# Patient Record
Sex: FEMALE | Race: WHITE | NOT HISPANIC OR LATINO | Employment: OTHER | URBAN - METROPOLITAN AREA
[De-identification: names, ages, dates, MRNs, and addresses within clinical notes are randomized per-mention and may not be internally consistent; named-entity substitution may affect disease eponyms.]

---

## 2024-01-03 ENCOUNTER — HOSPITAL ENCOUNTER (EMERGENCY)
Facility: HOSPITAL | Age: 89
Discharge: HOME | End: 2024-01-03
Attending: EMERGENCY MEDICINE
Payer: MEDICARE

## 2024-01-03 ENCOUNTER — APPOINTMENT (EMERGENCY)
Dept: RADIOLOGY | Facility: HOSPITAL | Age: 89
End: 2024-01-03
Payer: MEDICARE

## 2024-01-03 VITALS
DIASTOLIC BLOOD PRESSURE: 84 MMHG | OXYGEN SATURATION: 90 % | RESPIRATION RATE: 20 BRPM | SYSTOLIC BLOOD PRESSURE: 174 MMHG | TEMPERATURE: 98.1 F | WEIGHT: 124.34 LBS | HEART RATE: 97 BPM

## 2024-01-03 DIAGNOSIS — J20.5 RSV BRONCHITIS: Primary | ICD-10-CM

## 2024-01-03 DIAGNOSIS — I10 HYPERTENSION, UNSPECIFIED TYPE: ICD-10-CM

## 2024-01-03 LAB
ATRIAL RATE: 96 BPM
FLUAV RNA RESP QL NAA+PROBE: NEGATIVE
FLUBV RNA RESP QL NAA+PROBE: NEGATIVE
P AXIS: 222 DEGREES
PR INTERVAL: 158 MS
QRS AXIS: 179 DEGREES
QRSD INTERVAL: 46 MS
QT INTERVAL: 234 MS
QTC INTERVAL: 372 MS
RSV RNA RESP QL NAA+PROBE: POSITIVE
SARS-COV-2 RNA RESP QL NAA+PROBE: NEGATIVE
T WAVE AXIS: 216 DEGREES
VENTRICULAR RATE: 152 BPM

## 2024-01-03 PROCEDURE — 71046 X-RAY EXAM CHEST 2 VIEWS: CPT

## 2024-01-03 PROCEDURE — 99283 EMERGENCY DEPT VISIT LOW MDM: CPT

## 2024-01-03 PROCEDURE — 0241U HB NFCT DS VIR RESP RNA 4 TRGT: CPT | Performed by: EMERGENCY MEDICINE

## 2024-01-03 PROCEDURE — 93005 ELECTROCARDIOGRAM TRACING: CPT

## 2024-01-03 PROCEDURE — 99285 EMERGENCY DEPT VISIT HI MDM: CPT | Performed by: EMERGENCY MEDICINE

## 2024-01-03 RX ORDER — BENZONATATE 100 MG/1
100 CAPSULE ORAL ONCE
Status: COMPLETED | OUTPATIENT
Start: 2024-01-03 | End: 2024-01-03

## 2024-01-03 RX ADMIN — BENZONATATE 100 MG: 100 CAPSULE ORAL at 17:23

## 2024-01-03 NOTE — ED NOTES
Called MARYELLEN Barnes and provided with update. Requesting transport be arranged back home since patient is bedbound. Coordinated via Round Trip, per request.     Yesica Hurt RN  01/03/24 1539

## 2024-01-03 NOTE — ED NOTES
MARYELLEN Seaman, can be reached at [phone].       Patient's updated address:   [address] [place] [address] LACEY Banuelos RN  01/03/24 3879

## 2024-01-03 NOTE — ED PROVIDER NOTES
History  Chief Complaint   Patient presents with    Cough     Patient presents from home via EMS for congested cough. Per ems family reports RSV going throughout the house hold. Patient has history of dementia     89 YR FEMALE WITH  MILD DEMENTIA-- LIVES WITH GRAND DAUGHTER-- SINCE MONDAY WITH  NON PRODUCTIVE WET SOUND ING COUGH- HAD RECENT VIRTUAL VISIT -- PLACED ON COUGH MEDS--  AS PER ALISE- GRAND DAUGHTER -- TODAY MILD CONFUSION- NO FEVER- NO V/D- STATES HAS BED SORE UNDER TX--  NO TRAUMA- FALL- NO OTHER COMP      History provided by:  Relative  History limited by:  Dementia  Cough  Cough characteristics:  Dry  Severity:  Moderate  Onset quality:  Gradual  Duration:  3 days  Timing:  Intermittent  Associated symptoms: no shortness of breath and no wheezing        None       History reviewed. No pertinent past medical history.    History reviewed. No pertinent surgical history.    History reviewed. No pertinent family history.  I have reviewed and agree with the history as documented.    E-Cigarette/Vaping     E-Cigarette/Vaping Substances     Social History     Tobacco Use    Smoking status: Never    Smokeless tobacco: Never   Substance Use Topics    Alcohol use: Never    Drug use: Never       Review of Systems   Constitutional: Negative.    HENT: Negative.     Eyes: Negative.    Respiratory:  Positive for cough. Negative for apnea, choking, chest tightness, shortness of breath, wheezing and stridor.    Cardiovascular: Negative.    Gastrointestinal: Negative.    Endocrine: Negative.    Genitourinary: Negative.    Musculoskeletal: Negative.    Skin: Negative.    Allergic/Immunologic: Negative.    Neurological: Negative.    Hematological: Negative.    Psychiatric/Behavioral:  Positive for confusion. Negative for agitation, behavioral problems, decreased concentration, dysphoric mood, hallucinations, self-injury, sleep disturbance and suicidal ideas. The patient is not nervous/anxious and is not hyperactive.          THOUGHT SON WHO IS CURRENTLY IN MED REHAB WAS IN ROOM WITH HER TODAY  --        Physical Exam  Physical Exam  Vitals and nursing note reviewed.   Constitutional:       General: She is not in acute distress.     Appearance: She is not ill-appearing, toxic-appearing or diaphoretic.      Comments: FREQUNET COUGH IN ER- NON PRODUCTIVE- HTNSIVE- - PULSE OX 93 % ON RA- INTERPRETATION IS LOW- NORMAL    HENT:      Head: Normocephalic and atraumatic.      Right Ear: Tympanic membrane, ear canal and external ear normal. There is no impacted cerumen.      Left Ear: Tympanic membrane, ear canal and external ear normal. There is no impacted cerumen.      Nose: Nose normal. No congestion or rhinorrhea.      Mouth/Throat:      Mouth: Mucous membranes are dry.      Pharynx: Oropharynx is clear. No oropharyngeal exudate or posterior oropharyngeal erythema.   Eyes:      General: No scleral icterus.        Right eye: No discharge.         Left eye: No discharge.      Extraocular Movements: Extraocular movements intact.      Conjunctiva/sclera: Conjunctivae normal.      Pupils: Pupils are equal, round, and reactive to light.      Comments: MM PINK   Neck:      Vascular: No carotid bruit.      Comments: NO PMT C SPINE   Cardiovascular:      Rate and Rhythm: Regular rhythm.      Heart sounds: Murmur heard.      No friction rub. No gallop.   Pulmonary:      Effort: Pulmonary effort is normal. No respiratory distress.      Breath sounds: No stridor. Rhonchi present. No wheezing or rales.      Comments: LEFT SIDED LUNG RHONCI  Chest:      Chest wall: No tenderness.   Abdominal:      General: Bowel sounds are normal. There is no distension.      Palpations: Abdomen is soft. There is no mass.      Tenderness: There is no abdominal tenderness. There is no right CVA tenderness, left CVA tenderness, guarding or rebound.      Hernia: No hernia is present.      Comments: SOFT NT/ND- NO HSM - NO CVA TENDERNESS - NO ASCITES- NO PERITONEAL SIGNS-  NO PULSATILE ABD MASS/BRUIT/ TENDERNESS   Musculoskeletal:         General: No swelling, tenderness, deformity or signs of injury. Normal range of motion.      Cervical back: Normal range of motion and neck supple. No rigidity or tenderness.      Right lower leg: No edema.      Left lower leg: No edema.      Comments: EQUAL BILATERAL RADIAL PULSES- BLE BROWNISH SKIN DISCLORATION - NO EDEMA- ERYTHEMA- ASYM   Lymphadenopathy:      Cervical: No cervical adenopathy.   Skin:     Capillary Refill: Capillary refill takes less than 2 seconds.      Coloration: Skin is pale. Skin is not jaundiced.      Findings: No bruising, erythema, lesion or rash.   Neurological:      General: No focal deficit present.      Mental Status: She is alert. Mental status is at baseline.      Cranial Nerves: No cranial nerve deficit.      Sensory: No sensory deficit.      Motor: Weakness present.      Comments: A AND O X 2- BASELINE- NON FOCAL NEURO EXAM- DIFFUSE MILD 4/5  BLE/BLE WEAKNESS   Psychiatric:         Mood and Affect: Mood normal.         Behavior: Behavior normal.         Vital Signs  ED Triage Vitals [01/03/24 1621]   Temperature Pulse Respirations Blood Pressure SpO2   98.1 °F (36.7 °C) 60 21 (!) 188/85 93 %      Temp Source Heart Rate Source Patient Position - Orthostatic VS BP Location FiO2 (%)   Oral Monitor Sitting Right arm --      Pain Score       --           Vitals:    01/03/24 1621   BP: (!) 188/85   Pulse: 60   Patient Position - Orthostatic VS: Sitting         Visual Acuity      ED Medications  Medications   benzonatate (TESSALON PERLES) capsule 100 mg (100 mg Oral Given 1/3/24 1723)       Diagnostic Studies  Results Reviewed       Procedure Component Value Units Date/Time    COVID/FLU/RSV [[id number]] Collected: 01/03/24 1724    Lab Status: In process Specimen: Nares from Nose Updated: 01/03/24 1735                   XR chest 2 views    (Results Pending)              Procedures  Procedures         ED Course  ED Course as  of 01/07/24 1257   Wed Jan 03, 2024   1728 ER MD NOTE- CASE D/W- GRAND DAUGHTER ALISE-- WET SOUNDING COUGH STARTED ON MONDAY -- PT IS HOME BOUND- WAS AROUND  FAMILY MEMBER WITH RSV LAST WEEK - PT IS NON AMBULATORY - NO FEVERS-  ALISE GIVES DAUGHTER ALL MEDS- NO TRAUMA-   INCREASE IN CONFUSION TODAY --    1749 CXR PA/LAT-  NO OLD CXR FOR COMPARISON -- NO FREE/SQ AIR- NO INFILTRATE- PTX- PULM EDEMA- PLEURAL EFFUSIONS    1823 - ER MD NOTE-  SACRAL AREA INSPECTED BY ER MD - WITH NURSE PRESENT- STAGE ONE SACRAL DECUB- NO OPEN AREAS- NO SIGNS OF ANY SURROUNDING SKIN INFECTION- NORMAL PERIANAL AREA   1824 ER MD NOTE- GRAND DAUGHTER ALISE AWARE OF RSV POS-  AND PT IN NAD- AGREE WITH D/C TO HOME-- GIVEN REASONS WHEN TO RETUR N TO  THE ER-  S ALISE VERBALIZES UNDERSTANDING                                             Medical Decision Making  Pt with ill contacts with similar with resp complaints- but no feve/systemic comps- pt will need viral testing cxr and re-eval     Problems Addressed:  Hypertension, unspecified type: acute illness or injury     Details: See chart  RSV bronchitis: acute illness or injury     Details: See chart    Amount and/or Complexity of Data Reviewed  Independent Historian:      Details: Grand daugther over phone   Labs: ordered. Decision-making details documented in ED Course.     Details: All reviewed   Radiology: ordered and independent interpretation performed. Decision-making details documented in ED Course.     Details: All reviewed   Discussion of management or test interpretation with external provider(s): Moderate amount of er md thought complexity     Risk  Prescription drug management.  Decision regarding hospitalization.             Disposition  Final diagnoses:   None     ED Disposition       None          Follow-up Information    None         Patient's Medications    No medications on file       No discharge procedures on file.    PDMP Review       None            ED  Provider  Electronically Signed by             Emory Wyatt MD  01/07/24 4918

## 2024-01-03 NOTE — DISCHARGE INSTRUCTIONS
DIAGNOSISA; RSV- RESPIRATORY SYNCYTIAL  VIRUS - ELEVATED BLOOD PRESSURE IN THE ER -  170/80    - PLEASE KEEP WELL HYDRATED     - FOR ANY BODY ACHES/   OVER THE COUNTER GENERIC ACETAMINOPHEN 500 MG EVERY 4 HRS WHILE AWAKE     - THERE IS NO MAGIC COUGH MEDICATION - CAN  CONTINUE WITH THE  TESSALON - CAN TRY A SPOONFUL OF HONEY 1 HR PRIOR TO BED    - HER BED SORE AREA LOOK OK-- DOES NOT LOOK INFECTED- NO SKIN BREAKDOWN AT PRESENT     - COUGH CAN LAST FOR 21-3 WEEKS BUT SHOULD IMPROVE OVER TIME-- PLEASE RETURN TO THE ER FOR ANY INCREASING DIFFICULTY BREATHING  OR ANY NEW/ WORSENING/CONCERNING SYMPTOMS TO YOU

## 2024-01-04 LAB
ATRIAL RATE: 76 BPM
P AXIS: 82 DEGREES
PR INTERVAL: 216 MS
QRS AXIS: 16 DEGREES
QRSD INTERVAL: 66 MS
QT INTERVAL: 440 MS
QTC INTERVAL: 495 MS
T WAVE AXIS: 33 DEGREES
VENTRICULAR RATE: 76 BPM

## 2024-01-04 NOTE — ED NOTES
RN notified MARYELLEN Barnes that pt is being transported back to residence now.      Hawa Can RN  01/03/24 4211

## 2024-01-12 ENCOUNTER — APPOINTMENT (EMERGENCY)
Dept: RADIOLOGY | Facility: HOSPITAL | Age: 89
DRG: 640 | End: 2024-01-12
Payer: MEDICARE

## 2024-01-12 ENCOUNTER — HOSPITAL ENCOUNTER (INPATIENT)
Facility: HOSPITAL | Age: 89
LOS: 4 days | Discharge: HOME HEALTH | DRG: 640 | End: 2024-01-17
Attending: EMERGENCY MEDICINE | Admitting: INTERNAL MEDICINE
Payer: MEDICARE

## 2024-01-12 DIAGNOSIS — R26.2 AMBULATORY DYSFUNCTION: ICD-10-CM

## 2024-01-12 DIAGNOSIS — D64.9 ANEMIA: ICD-10-CM

## 2024-01-12 DIAGNOSIS — R73.9 HYPERGLYCEMIA: ICD-10-CM

## 2024-01-12 DIAGNOSIS — F03.911 DEMENTIA WITH AGITATION, UNSPECIFIED DEMENTIA SEVERITY, UNSPECIFIED DEMENTIA TYPE (HCC): ICD-10-CM

## 2024-01-12 DIAGNOSIS — R41.82 AMS (ALTERED MENTAL STATUS): Primary | ICD-10-CM

## 2024-01-12 DIAGNOSIS — F03.90 DEMENTIA (HCC): ICD-10-CM

## 2024-01-12 DIAGNOSIS — R79.89 ELEVATED D-DIMER: ICD-10-CM

## 2024-01-12 DIAGNOSIS — I48.0 PAROXYSMAL ATRIAL FIBRILLATION (HCC): ICD-10-CM

## 2024-01-12 DIAGNOSIS — E87.6 HYPOKALEMIA: ICD-10-CM

## 2024-01-12 DIAGNOSIS — J18.9 PNA (PNEUMONIA): ICD-10-CM

## 2024-01-12 DIAGNOSIS — D72.829 LEUKOCYTOSIS: ICD-10-CM

## 2024-01-12 DIAGNOSIS — R53.1 GENERALIZED WEAKNESS: ICD-10-CM

## 2024-01-12 DIAGNOSIS — R79.89 ELEVATED TROPONIN: ICD-10-CM

## 2024-01-12 DIAGNOSIS — R94.31 LONG QT INTERVAL: ICD-10-CM

## 2024-01-12 PROCEDURE — 85610 PROTHROMBIN TIME: CPT

## 2024-01-12 PROCEDURE — 99285 EMERGENCY DEPT VISIT HI MDM: CPT | Performed by: EMERGENCY MEDICINE

## 2024-01-12 PROCEDURE — 84484 ASSAY OF TROPONIN QUANT: CPT

## 2024-01-12 PROCEDURE — 83605 ASSAY OF LACTIC ACID: CPT

## 2024-01-12 PROCEDURE — 80053 COMPREHEN METABOLIC PANEL: CPT

## 2024-01-12 PROCEDURE — 85025 COMPLETE CBC W/AUTO DIFF WBC: CPT

## 2024-01-12 PROCEDURE — 84145 PROCALCITONIN (PCT): CPT

## 2024-01-12 PROCEDURE — 85379 FIBRIN DEGRADATION QUANT: CPT

## 2024-01-12 PROCEDURE — 83735 ASSAY OF MAGNESIUM: CPT

## 2024-01-12 PROCEDURE — 71046 X-RAY EXAM CHEST 2 VIEWS: CPT

## 2024-01-12 PROCEDURE — 93005 ELECTROCARDIOGRAM TRACING: CPT

## 2024-01-12 PROCEDURE — 99285 EMERGENCY DEPT VISIT HI MDM: CPT

## 2024-01-12 PROCEDURE — 87040 BLOOD CULTURE FOR BACTERIA: CPT

## 2024-01-12 PROCEDURE — 85730 THROMBOPLASTIN TIME PARTIAL: CPT

## 2024-01-12 PROCEDURE — 36415 COLL VENOUS BLD VENIPUNCTURE: CPT

## 2024-01-12 RX ORDER — MAGNESIUM SULFATE HEPTAHYDRATE 40 MG/ML
2 INJECTION, SOLUTION INTRAVENOUS ONCE
Status: COMPLETED | OUTPATIENT
Start: 2024-01-12 | End: 2024-01-13

## 2024-01-12 NOTE — Clinical Note
Case was discussed with xx and the patient's admission status was agreed to be Admission Status: inpatient status to the service of Dr. solano .

## 2024-01-13 ENCOUNTER — APPOINTMENT (EMERGENCY)
Dept: CT IMAGING | Facility: HOSPITAL | Age: 89
DRG: 640 | End: 2024-01-13
Payer: MEDICARE

## 2024-01-13 ENCOUNTER — APPOINTMENT (INPATIENT)
Dept: RADIOLOGY | Facility: HOSPITAL | Age: 89
DRG: 640 | End: 2024-01-13
Payer: MEDICARE

## 2024-01-13 PROBLEM — F41.9 ANXIETY: Status: ACTIVE | Noted: 2023-02-21

## 2024-01-13 PROBLEM — B33.8 RSV INFECTION: Status: ACTIVE | Noted: 2024-01-13

## 2024-01-13 PROBLEM — I48.0 PAROXYSMAL ATRIAL FIBRILLATION (HCC): Status: ACTIVE | Noted: 2023-02-22

## 2024-01-13 PROBLEM — R62.7 FAILURE TO THRIVE IN ADULT: Status: ACTIVE | Noted: 2023-08-12

## 2024-01-13 PROBLEM — R05.1 ACUTE COUGH: Status: ACTIVE | Noted: 2024-01-13

## 2024-01-13 PROBLEM — I48.91 ATRIAL FIBRILLATION (HCC): Status: ACTIVE | Noted: 2023-02-22

## 2024-01-13 PROBLEM — F03.911 DEMENTIA WITH AGITATION (HCC): Status: ACTIVE | Noted: 2024-01-13

## 2024-01-13 PROBLEM — K21.9 GERD (GASTROESOPHAGEAL REFLUX DISEASE): Status: ACTIVE | Noted: 2024-01-13

## 2024-01-13 PROBLEM — E87.6 HYPOKALEMIA: Status: ACTIVE | Noted: 2024-01-13

## 2024-01-13 PROBLEM — R79.89 ELEVATED TROPONIN LEVEL: Status: ACTIVE | Noted: 2024-01-13

## 2024-01-13 PROBLEM — R26.2 AMBULATORY DYSFUNCTION: Status: ACTIVE | Noted: 2023-02-21

## 2024-01-13 PROBLEM — R53.1 GENERALIZED WEAKNESS: Status: ACTIVE | Noted: 2023-08-12

## 2024-01-13 LAB
2HR DELTA HS TROPONIN: 3 NG/L
4HR DELTA HS TROPONIN: 29 NG/L
ALBUMIN SERPL BCP-MCNC: 3.2 G/DL (ref 3.5–5)
ALP SERPL-CCNC: 61 U/L (ref 34–104)
ALT SERPL W P-5'-P-CCNC: 6 U/L (ref 7–52)
ANION GAP SERPL CALCULATED.3IONS-SCNC: 4 MMOL/L
ANION GAP SERPL CALCULATED.3IONS-SCNC: 9 MMOL/L
APTT PPP: 35 SECONDS (ref 23–37)
AST SERPL W P-5'-P-CCNC: 12 U/L (ref 13–39)
ATRIAL RATE: 102 BPM
ATRIAL RATE: 94 BPM
BACTERIA UR QL AUTO: ABNORMAL /HPF
BASOPHILS # BLD AUTO: 0.08 THOUSANDS/ÂΜL (ref 0–0.1)
BASOPHILS NFR BLD AUTO: 1 % (ref 0–1)
BILIRUB SERPL-MCNC: 0.76 MG/DL (ref 0.2–1)
BILIRUB UR QL STRIP: NEGATIVE
BUN SERPL-MCNC: 14 MG/DL (ref 5–25)
BUN SERPL-MCNC: 14 MG/DL (ref 5–25)
CALCIUM ALBUM COR SERPL-MCNC: 9.4 MG/DL (ref 8.3–10.1)
CALCIUM SERPL-MCNC: 8.1 MG/DL (ref 8.4–10.2)
CALCIUM SERPL-MCNC: 8.8 MG/DL (ref 8.4–10.2)
CARDIAC TROPONIN I PNL SERPL HS: 56 NG/L
CARDIAC TROPONIN I PNL SERPL HS: 59 NG/L
CARDIAC TROPONIN I PNL SERPL HS: 85 NG/L
CHLORIDE SERPL-SCNC: 104 MMOL/L (ref 96–108)
CHLORIDE SERPL-SCNC: 109 MMOL/L (ref 96–108)
CLARITY UR: ABNORMAL
CO2 SERPL-SCNC: 28 MMOL/L (ref 21–32)
CO2 SERPL-SCNC: 28 MMOL/L (ref 21–32)
COLOR UR: YELLOW
CREAT SERPL-MCNC: 0.81 MG/DL (ref 0.6–1.3)
CREAT SERPL-MCNC: 0.92 MG/DL (ref 0.6–1.3)
D DIMER PPP FEU-MCNC: 1.3 UG/ML FEU
EOSINOPHIL # BLD AUTO: 0.05 THOUSAND/ÂΜL (ref 0–0.61)
EOSINOPHIL NFR BLD AUTO: 0 % (ref 0–6)
ERYTHROCYTE [DISTWIDTH] IN BLOOD BY AUTOMATED COUNT: 15.5 % (ref 11.6–15.1)
FLUAV RNA RESP QL NAA+PROBE: NEGATIVE
FLUBV RNA RESP QL NAA+PROBE: NEGATIVE
GFR SERPL CREATININE-BSD FRML MDRD: 51 ML/MIN/1.73SQ M
GFR SERPL CREATININE-BSD FRML MDRD: 60 ML/MIN/1.73SQ M
GLUCOSE SERPL-MCNC: 205 MG/DL (ref 65–140)
GLUCOSE SERPL-MCNC: 206 MG/DL (ref 65–140)
GLUCOSE UR STRIP-MCNC: NEGATIVE MG/DL
HCT VFR BLD AUTO: 34.3 % (ref 34.8–46.1)
HGB BLD-MCNC: 10.8 G/DL (ref 11.5–15.4)
HGB UR QL STRIP.AUTO: ABNORMAL
IMM GRANULOCYTES # BLD AUTO: 0.1 THOUSAND/UL (ref 0–0.2)
IMM GRANULOCYTES NFR BLD AUTO: 1 % (ref 0–2)
INR PPP: 1.55 (ref 0.84–1.19)
KETONES UR STRIP-MCNC: ABNORMAL MG/DL
L PNEUMO1 AG UR QL IA.RAPID: NEGATIVE
LACTATE SERPL-SCNC: 1.5 MMOL/L (ref 0.5–2)
LEUKOCYTE ESTERASE UR QL STRIP: ABNORMAL
LYMPHOCYTES # BLD AUTO: 0.53 THOUSANDS/ÂΜL (ref 0.6–4.47)
LYMPHOCYTES NFR BLD AUTO: 3 % (ref 14–44)
MAGNESIUM SERPL-MCNC: 1.9 MG/DL (ref 1.9–2.7)
MCH RBC QN AUTO: 30.5 PG (ref 26.8–34.3)
MCHC RBC AUTO-ENTMCNC: 31.5 G/DL (ref 31.4–37.4)
MCV RBC AUTO: 97 FL (ref 82–98)
MONOCYTES # BLD AUTO: 0.92 THOUSAND/ÂΜL (ref 0.17–1.22)
MONOCYTES NFR BLD AUTO: 6 % (ref 4–12)
MUCOUS THREADS UR QL AUTO: ABNORMAL
NEUTROPHILS # BLD AUTO: 13.96 THOUSANDS/ÂΜL (ref 1.85–7.62)
NEUTS SEG NFR BLD AUTO: 89 % (ref 43–75)
NITRITE UR QL STRIP: NEGATIVE
NON-SQ EPI CELLS URNS QL MICRO: ABNORMAL /HPF
NRBC BLD AUTO-RTO: 0 /100 WBCS
P AXIS: 81 DEGREES
P AXIS: 84 DEGREES
PH UR STRIP.AUTO: 5.5 [PH]
PLATELET # BLD AUTO: 439 THOUSANDS/UL (ref 149–390)
PMV BLD AUTO: 10 FL (ref 8.9–12.7)
POTASSIUM SERPL-SCNC: 2.9 MMOL/L (ref 3.5–5.3)
POTASSIUM SERPL-SCNC: 3.4 MMOL/L (ref 3.5–5.3)
PR INTERVAL: 168 MS
PR INTERVAL: 222 MS
PROCALCITONIN SERPL-MCNC: 0.12 NG/ML
PROT SERPL-MCNC: 7 G/DL (ref 6.4–8.4)
PROT UR STRIP-MCNC: ABNORMAL MG/DL
PROTHROMBIN TIME: 19.3 SECONDS (ref 11.6–14.5)
QRS AXIS: 77 DEGREES
QRS AXIS: 86 DEGREES
QRSD INTERVAL: 120 MS
QRSD INTERVAL: 122 MS
QT INTERVAL: 414 MS
QT INTERVAL: 418 MS
QTC INTERVAL: 522 MS
QTC INTERVAL: 539 MS
RBC # BLD AUTO: 3.54 MILLION/UL (ref 3.81–5.12)
RBC #/AREA URNS AUTO: ABNORMAL /HPF
RSV RNA RESP QL NAA+PROBE: NEGATIVE
S PNEUM AG UR QL: NEGATIVE
SARS-COV-2 RNA RESP QL NAA+PROBE: NEGATIVE
SODIUM SERPL-SCNC: 141 MMOL/L (ref 135–147)
SODIUM SERPL-SCNC: 141 MMOL/L (ref 135–147)
SP GR UR STRIP.AUTO: >=1.05 (ref 1–1.03)
T WAVE AXIS: -25 DEGREES
T WAVE AXIS: -27 DEGREES
UROBILINOGEN UR STRIP-ACNC: <2 MG/DL
VENTRICULAR RATE: 102 BPM
VENTRICULAR RATE: 94 BPM
WBC # BLD AUTO: 15.64 THOUSAND/UL (ref 4.31–10.16)
WBC #/AREA URNS AUTO: ABNORMAL /HPF

## 2024-01-13 PROCEDURE — 73030 X-RAY EXAM OF SHOULDER: CPT

## 2024-01-13 PROCEDURE — 96368 THER/DIAG CONCURRENT INF: CPT

## 2024-01-13 PROCEDURE — 74177 CT ABD & PELVIS W/CONTRAST: CPT

## 2024-01-13 PROCEDURE — 87086 URINE CULTURE/COLONY COUNT: CPT

## 2024-01-13 PROCEDURE — 93005 ELECTROCARDIOGRAM TRACING: CPT

## 2024-01-13 PROCEDURE — 99223 1ST HOSP IP/OBS HIGH 75: CPT | Performed by: HOSPITALIST

## 2024-01-13 PROCEDURE — 96366 THER/PROPH/DIAG IV INF ADDON: CPT

## 2024-01-13 PROCEDURE — 0241U HB NFCT DS VIR RESP RNA 4 TRGT: CPT

## 2024-01-13 PROCEDURE — 96372 THER/PROPH/DIAG INJ SC/IM: CPT

## 2024-01-13 PROCEDURE — 36415 COLL VENOUS BLD VENIPUNCTURE: CPT

## 2024-01-13 PROCEDURE — 87147 CULTURE TYPE IMMUNOLOGIC: CPT

## 2024-01-13 PROCEDURE — 71275 CT ANGIOGRAPHY CHEST: CPT

## 2024-01-13 PROCEDURE — 81001 URINALYSIS AUTO W/SCOPE: CPT

## 2024-01-13 PROCEDURE — 87186 SC STD MICRODIL/AGAR DIL: CPT

## 2024-01-13 PROCEDURE — 96365 THER/PROPH/DIAG IV INF INIT: CPT

## 2024-01-13 PROCEDURE — 87077 CULTURE AEROBIC IDENTIFY: CPT

## 2024-01-13 PROCEDURE — 80048 BASIC METABOLIC PNL TOTAL CA: CPT

## 2024-01-13 PROCEDURE — G1004 CDSM NDSC: HCPCS

## 2024-01-13 PROCEDURE — 92610 EVALUATE SWALLOWING FUNCTION: CPT

## 2024-01-13 PROCEDURE — 87449 NOS EACH ORGANISM AG IA: CPT

## 2024-01-13 PROCEDURE — 84484 ASSAY OF TROPONIN QUANT: CPT

## 2024-01-13 RX ORDER — PANTOPRAZOLE SODIUM 40 MG/1
40 TABLET, DELAYED RELEASE ORAL EVERY MORNING
COMMUNITY
Start: 2024-01-02

## 2024-01-13 RX ORDER — PRAVASTATIN SODIUM 20 MG
20 TABLET ORAL DAILY
COMMUNITY

## 2024-01-13 RX ORDER — ACETAMINOPHEN 325 MG/1
650 TABLET ORAL EVERY 6 HOURS PRN
COMMUNITY

## 2024-01-13 RX ORDER — LANOLIN ALCOHOL/MO/W.PET/CERES
6 CREAM (GRAM) TOPICAL
Status: DISCONTINUED | OUTPATIENT
Start: 2024-01-13 | End: 2024-01-17 | Stop reason: HOSPADM

## 2024-01-13 RX ORDER — SERTRALINE HYDROCHLORIDE 25 MG/1
25 TABLET, FILM COATED ORAL DAILY
Status: DISCONTINUED | OUTPATIENT
Start: 2024-01-13 | End: 2024-01-16

## 2024-01-13 RX ORDER — BENZONATATE 200 MG/1
200 CAPSULE ORAL 3 TIMES DAILY PRN
COMMUNITY
Start: 2024-01-02

## 2024-01-13 RX ORDER — PANTOPRAZOLE SODIUM 40 MG/1
40 TABLET, DELAYED RELEASE ORAL EVERY MORNING
Status: DISCONTINUED | OUTPATIENT
Start: 2024-01-13 | End: 2024-01-17 | Stop reason: HOSPADM

## 2024-01-13 RX ORDER — ACETAMINOPHEN 325 MG/1
650 TABLET ORAL EVERY 6 HOURS PRN
Status: DISCONTINUED | OUTPATIENT
Start: 2024-01-13 | End: 2024-01-17 | Stop reason: HOSPADM

## 2024-01-13 RX ORDER — PRAVASTATIN SODIUM 20 MG
20 TABLET ORAL DAILY
Status: DISCONTINUED | OUTPATIENT
Start: 2024-01-13 | End: 2024-01-17 | Stop reason: HOSPADM

## 2024-01-13 RX ORDER — POTASSIUM CHLORIDE 20 MEQ/1
20 TABLET, EXTENDED RELEASE ORAL ONCE
Status: COMPLETED | OUTPATIENT
Start: 2024-01-13 | End: 2024-01-13

## 2024-01-13 RX ORDER — LIDOCAINE 50 MG/G
1 PATCH TOPICAL DAILY
Status: DISCONTINUED | OUTPATIENT
Start: 2024-01-13 | End: 2024-01-17 | Stop reason: HOSPADM

## 2024-01-13 RX ORDER — HEPARIN SODIUM 5000 [USP'U]/ML
5000 INJECTION, SOLUTION INTRAVENOUS; SUBCUTANEOUS EVERY 8 HOURS SCHEDULED
Status: DISCONTINUED | OUTPATIENT
Start: 2024-01-13 | End: 2024-01-13

## 2024-01-13 RX ORDER — WATER 10 ML/10ML
INJECTION INTRAMUSCULAR; INTRAVENOUS; SUBCUTANEOUS
Status: COMPLETED
Start: 2024-01-13 | End: 2024-01-13

## 2024-01-13 RX ORDER — BISACODYL 10 MG
10 SUPPOSITORY, RECTAL RECTAL DAILY PRN
Status: DISCONTINUED | OUTPATIENT
Start: 2024-01-13 | End: 2024-01-17 | Stop reason: HOSPADM

## 2024-01-13 RX ORDER — POTASSIUM CHLORIDE 14.9 MG/ML
20 INJECTION INTRAVENOUS ONCE
Status: DISCONTINUED | OUTPATIENT
Start: 2024-01-13 | End: 2024-01-13

## 2024-01-13 RX ORDER — MUSCLE RUB CREAM 100; 150 MG/G; MG/G
CREAM TOPICAL 4 TIMES DAILY PRN
Status: DISCONTINUED | OUTPATIENT
Start: 2024-01-13 | End: 2024-01-17 | Stop reason: HOSPADM

## 2024-01-13 RX ORDER — BENZONATATE 100 MG/1
100 CAPSULE ORAL 3 TIMES DAILY
Status: DISCONTINUED | OUTPATIENT
Start: 2024-01-13 | End: 2024-01-17 | Stop reason: HOSPADM

## 2024-01-13 RX ORDER — ACETAMINOPHEN 325 MG/1
650 TABLET ORAL EVERY 6 HOURS PRN
Status: CANCELLED | OUTPATIENT
Start: 2024-01-13

## 2024-01-13 RX ORDER — SODIUM CHLORIDE, SODIUM GLUCONATE, SODIUM ACETATE, POTASSIUM CHLORIDE, MAGNESIUM CHLORIDE, SODIUM PHOSPHATE, DIBASIC, AND POTASSIUM PHOSPHATE .53; .5; .37; .037; .03; .012; .00082 G/100ML; G/100ML; G/100ML; G/100ML; G/100ML; G/100ML; G/100ML
75 INJECTION, SOLUTION INTRAVENOUS CONTINUOUS
Status: DISPENSED | OUTPATIENT
Start: 2024-01-13 | End: 2024-01-13

## 2024-01-13 RX ORDER — OLANZAPINE 10 MG/2ML
10 INJECTION, POWDER, FOR SOLUTION INTRAMUSCULAR ONCE
Status: COMPLETED | OUTPATIENT
Start: 2024-01-13 | End: 2024-01-13

## 2024-01-13 RX ORDER — GUAIFENESIN/DEXTROMETHORPHAN 100-10MG/5
10 SYRUP ORAL EVERY 8 HOURS
Status: DISCONTINUED | OUTPATIENT
Start: 2024-01-13 | End: 2024-01-17 | Stop reason: HOSPADM

## 2024-01-13 RX ORDER — SERTRALINE HYDROCHLORIDE 25 MG/1
25 TABLET, FILM COATED ORAL DAILY
COMMUNITY
Start: 2024-01-02 | End: 2024-01-17

## 2024-01-13 RX ORDER — BISACODYL 10 MG
10 SUPPOSITORY, RECTAL RECTAL DAILY PRN
COMMUNITY

## 2024-01-13 RX ORDER — POTASSIUM CHLORIDE 20 MEQ/1
40 TABLET, EXTENDED RELEASE ORAL ONCE
Status: COMPLETED | OUTPATIENT
Start: 2024-01-13 | End: 2024-01-13

## 2024-01-13 RX ADMIN — AZITHROMYCIN MONOHYDRATE 500 MG: 500 INJECTION, POWDER, LYOPHILIZED, FOR SOLUTION INTRAVENOUS at 04:59

## 2024-01-13 RX ADMIN — CYANOCOBALAMIN TAB 500 MCG 1000 MCG: 500 TAB at 10:31

## 2024-01-13 RX ADMIN — POTASSIUM CHLORIDE 40 MEQ: 1500 TABLET, EXTENDED RELEASE ORAL at 01:49

## 2024-01-13 RX ADMIN — IOHEXOL 85 ML: 350 INJECTION, SOLUTION INTRAVENOUS at 02:56

## 2024-01-13 RX ADMIN — DEXTROSE 1000 MG: 50 INJECTION, SOLUTION INTRAVENOUS at 09:24

## 2024-01-13 RX ADMIN — OLANZAPINE 10 MG: 10 INJECTION, POWDER, FOR SOLUTION INTRAMUSCULAR at 03:23

## 2024-01-13 RX ADMIN — POTASSIUM CHLORIDE 10 MEQ: 2 INJECTION, SOLUTION, CONCENTRATE INTRAVENOUS at 01:48

## 2024-01-13 RX ADMIN — GUAIFENESIN AND DEXTROMETHORPHAN 10 ML: 100; 10 SYRUP ORAL at 07:12

## 2024-01-13 RX ADMIN — PANTOPRAZOLE SODIUM 40 MG: 40 TABLET, DELAYED RELEASE ORAL at 09:25

## 2024-01-13 RX ADMIN — BENZONATATE 100 MG: 100 CAPSULE ORAL at 22:20

## 2024-01-13 RX ADMIN — GUAIFENESIN AND DEXTROMETHORPHAN 10 ML: 100; 10 SYRUP ORAL at 22:20

## 2024-01-13 RX ADMIN — SODIUM CHLORIDE 1000 ML: 0.9 INJECTION, SOLUTION INTRAVENOUS at 00:08

## 2024-01-13 RX ADMIN — APIXABAN 2.5 MG: 2.5 TABLET, FILM COATED ORAL at 17:42

## 2024-01-13 RX ADMIN — BENZONATATE 100 MG: 100 CAPSULE ORAL at 15:15

## 2024-01-13 RX ADMIN — SODIUM CHLORIDE, SODIUM GLUCONATE, SODIUM ACETATE, POTASSIUM CHLORIDE, MAGNESIUM CHLORIDE, SODIUM PHOSPHATE, DIBASIC, AND POTASSIUM PHOSPHATE 75 ML/HR: .53; .5; .37; .037; .03; .012; .00082 INJECTION, SOLUTION INTRAVENOUS at 07:13

## 2024-01-13 RX ADMIN — MAGNESIUM SULFATE HEPTAHYDRATE 2 G: 40 INJECTION, SOLUTION INTRAVENOUS at 00:24

## 2024-01-13 RX ADMIN — LIDOCAINE 5% 1 PATCH: 700 PATCH TOPICAL at 12:25

## 2024-01-13 RX ADMIN — Medication 6 MG: at 22:20

## 2024-01-13 RX ADMIN — GUAIFENESIN AND DEXTROMETHORPHAN 10 ML: 100; 10 SYRUP ORAL at 15:15

## 2024-01-13 RX ADMIN — APIXABAN 2.5 MG: 2.5 TABLET, FILM COATED ORAL at 09:24

## 2024-01-13 RX ADMIN — WATER: 1 INJECTION INTRAMUSCULAR; INTRAVENOUS; SUBCUTANEOUS at 03:23

## 2024-01-13 RX ADMIN — POTASSIUM CHLORIDE 20 MEQ: 1500 TABLET, EXTENDED RELEASE ORAL at 10:37

## 2024-01-13 RX ADMIN — SERTRALINE 25 MG: 25 TABLET, FILM COATED ORAL at 10:32

## 2024-01-13 RX ADMIN — PRAVASTATIN SODIUM 20 MG: 20 TABLET ORAL at 10:33

## 2024-01-13 RX ADMIN — METOPROLOL TARTRATE 12.5 MG: 25 TABLET, FILM COATED ORAL at 09:24

## 2024-01-13 RX ADMIN — BENZONATATE 100 MG: 100 CAPSULE ORAL at 09:25

## 2024-01-13 RX ADMIN — METOPROLOL TARTRATE 12.5 MG: 25 TABLET, FILM COATED ORAL at 22:20

## 2024-01-13 NOTE — ASSESSMENT & PLAN NOTE
POA: Tested positive for RSV on 1/3/2023 due to sick contact at home with worsening weakness and increasing cough productive of green sputum.   Afebrile with elevated WBC 15.6 but normal procalcitonin on admission  CTA chest showed no PE but possible new RLL patchy opacity concerning for possible pneumonia.   Suspect RSV with possible viral vs bacterial vs aspiration pneumonia  Received azithromycin 500 mg x 1, Ceftriaxone x1 and 1L NS bolus in the ED  Repeat proCal 0.38    Plan:  Monitor temperature and WBC  F/u blood and sputum culture if able  F/u urine strep and legionella   Follow up repeat COVID/flu/RSV  C/w Ceftriaxone

## 2024-01-13 NOTE — ASSESSMENT & PLAN NOTE
POA: serum K 2.9, received IV KCl in ED. Normal mag. Prolonged Qtc on ECG although uncorrected for widened QRS.   Today K+ 3.7    Plan:  Monitor BMP  F/u K in a.m. - replete as needed  Avoid OTC prolonging meds if possible, consider repeat ECG when lytes normalize if appropriate

## 2024-01-13 NOTE — PLAN OF CARE
Problem: SLP ADULT - SWALLOWING, IMPAIRED  Goal: Advance to least restrictive diet without signs or symptoms of aspiration for planned discharge setting.  See evaluation for individualized goals.  Description: Patient will:    1.  Safely swallow mechanical soft solids and thin/ all liquids without overt signs of aspiration 100% of time.  1 day.    Outcome: Adequate for Discharge     Problem: SLP ADULT - SWALLOWING, IMPAIRED  Goal: Initial SLP swallow eval performed  Outcome: Completed

## 2024-01-13 NOTE — ASSESSMENT & PLAN NOTE
POA: Mildly elevated trops from 50s -> 80s most likely nonMI troponin elevation in the setting of active infection. No ischemic changes on ECG noted and patient denies any chest pain or SOB although sure how reliable given possible underlying dementia.     Plan:  Telemetry monitoring  No further trop Trend indicated at this time

## 2024-01-13 NOTE — ED PROVIDER NOTES
History  Chief Complaint   Patient presents with    Cough     Patient has productive congested cough. Patient has diagnosis of RSV recently. Was prescribed medication at another facility but was unable to swallow the pills so they were stopped by family. Patient is ams compared to usual.      89yo F w/ h/o dementia presenting for cough. Of note, Pt was seen here on 1/3 for cough/SOB and was diagnosed with RSV. Since d/c Pt has had worsening cough and confusion per the daughter. Pt unable to provide a reliable history given her mental status. Pt's only complaint are cough and sacral pain. Denies F/C, HA, vision changes, hematuria, dysuria, SOB, CP, abdominal pain.      History provided by:  EMS personnel and medical records      None       History reviewed. No pertinent past medical history.    History reviewed. No pertinent surgical history.    History reviewed. No pertinent family history.  I have reviewed and agree with the history as documented.    E-Cigarette/Vaping     E-Cigarette/Vaping Substances     Social History     Tobacco Use    Smoking status: Never    Smokeless tobacco: Never   Substance Use Topics    Alcohol use: Never    Drug use: Never        Review of Systems   Unable to perform ROS: Dementia       Physical Exam  ED Triage Vitals [01/12/24 2251]   Temperature Pulse Respirations Blood Pressure SpO2   97.7 °F (36.5 °C) (!) 108 18 137/69 91 %      Temp Source Heart Rate Source Patient Position - Orthostatic VS BP Location FiO2 (%)   Oral Monitor Lying Right arm --      Pain Score       --             Orthostatic Vital Signs  Vitals:    01/12/24 2251 01/13/24 0200 01/13/24 0315   BP: 137/69  164/73   Pulse: (!) 108 86 96   Patient Position - Orthostatic VS: Lying Lying Lying       Physical Exam  Constitutional:       General: She is not in acute distress.     Appearance: Normal appearance.   HENT:      Head: Normocephalic and atraumatic.      Right Ear: External ear normal.      Left Ear: External ear  normal.      Nose: Nose normal. No rhinorrhea.      Mouth/Throat:      Mouth: Mucous membranes are dry.      Pharynx: Oropharynx is clear.   Eyes:      Extraocular Movements: Extraocular movements intact.      Conjunctiva/sclera: Conjunctivae normal.      Pupils: Pupils are equal, round, and reactive to light.   Cardiovascular:      Rate and Rhythm: Normal rate and regular rhythm.      Pulses: Normal pulses.      Heart sounds: Normal heart sounds.   Pulmonary:      Effort: Pulmonary effort is normal.      Breath sounds: Normal breath sounds.   Abdominal:      General: Abdomen is flat.      Tenderness: There is abdominal tenderness in the periumbilical area and suprapubic area.   Musculoskeletal:         General: No tenderness.      Right lower leg: No edema.      Left lower leg: No edema.   Skin:     General: Skin is warm and dry.      Capillary Refill: Capillary refill takes less than 2 seconds.      Comments: Poor skin turgor   Neurological:      Mental Status: She is alert and oriented to person, place, and time.   Psychiatric:         Mood and Affect: Mood normal.         Behavior: Behavior normal.         ED Medications  Medications   azithromycin (ZITHROMAX) 500 mg in sodium chloride 0.9% 250mL IVPB 500 mg (500 mg Intravenous New Bag 1/13/24 0459)   sodium chloride 0.9 % bolus 1,000 mL (0 mL Intravenous Stopped 1/13/24 0108)   magnesium sulfate 2 g/50 mL IVPB (premix) 2 g (0 g Intravenous Stopped 1/13/24 0224)   potassium chloride (K-DUR,KLOR-CON) CR tablet 40 mEq (40 mEq Oral Given 1/13/24 0149)   potassium chloride 10 mEq in sodium chloride 0.9 % 100 mL IVPB (0 mEq Intravenous Stopped 1/13/24 0248)   potassium chloride 10 mEq in sodium chloride 0.9 % 100 mL IVPB (0 mEq Intravenous Stopped 1/13/24 0248)   iohexol (OMNIPAQUE) 350 MG/ML injection (MULTI-DOSE) 85 mL (85 mL Intravenous Given 1/13/24 0256)   OLANZapine (ZyPREXA) IM injection 10 mg (10 mg Intramuscular Given 1/13/24 0323)   sterile water injection  **ADS Override Pull** (  Given 1/13/24 0329)       Diagnostic Studies  Results Reviewed       Procedure Component Value Units Date/Time    UA w Reflex to Microscopic w Reflex to Culture [[id number]]  (Abnormal) Collected: 01/13/24 0425    Lab Status: Final result Specimen: Urine, Straight Cath Updated: 01/13/24 0513     Color, UA Yellow     Clarity, UA Extra Turbid     Specific Gravity, UA >=1.050     pH, UA 5.5     Leukocytes, UA Large     Nitrite, UA Negative     Protein, UA 30 (1+) mg/dl      Glucose, UA Negative mg/dl      Ketones, UA Trace mg/dl      Urobilinogen, UA <2.0 mg/dl      Bilirubin, UA Negative     Occult Blood, UA Moderate    Urine Microscopic [[id number]] Collected: 01/13/24 0425    Lab Status: In process Specimen: Urine, Straight Cath Updated: 01/13/24 0513    HS Troponin I 4hr [[id number]]  (Abnormal) Collected: 01/13/24 0429    Lab Status: Final result Specimen: Blood from Arm, Right Updated: 01/13/24 0502     hs TnI 4hr 85 ng/L      Delta 4hr hsTnI 29 ng/L     D-Dimer [[id number]]  (Abnormal) Collected: 01/12/24 2357    Lab Status: Final result Specimen: Blood from Arm, Right Updated: 01/13/24 0229     D-Dimer, Quant 1.30 ug/ml FEU     Narrative:      In the evaluation for possible pulmonary embolism, in the appropriate (Well's Score of 4 or less) patient, the age adjusted d-dimer cutoff for this patient can be calculated as:    Age x 0.01 (in ug/mL) for Age-adjusted D-dimer exclusion threshold for a patient over 50 years.    Protime-INR [[id number]]  (Abnormal) Collected: 01/12/24 2357    Lab Status: Final result Specimen: Blood from Arm, Right Updated: 01/13/24 0227     Protime 19.3 seconds      INR 1.55    APTT [[id number]]  (Normal) Collected: 01/12/24 2357    Lab Status: Final result Specimen: Blood from Arm, Right Updated: 01/13/24 0227     PTT 35 seconds     HS Troponin I 2hr [[id number]]  (Abnormal) Collected: 01/13/24 0149    Lab Status: Final result Specimen: Blood from Arm, Right Updated:  01/13/24 0227     hs TnI 2hr 59 ng/L      Delta 2hr hsTnI 3 ng/L     Procalcitonin [[id number]]  (Normal) Collected: 01/12/24 2357    Lab Status: Final result Specimen: Blood from Arm, Right Updated: 01/13/24 0039     Procalcitonin 0.12 ng/ml     HS Troponin 0hr (reflex protocol) [[id number]]  (Abnormal) Collected: 01/12/24 2357    Lab Status: Final result Specimen: Blood from Arm, Right Updated: 01/13/24 0038     hs TnI 0hr 56 ng/L     Comprehensive metabolic panel [[id number]]  (Abnormal) Collected: 01/12/24 2357    Lab Status: Final result Specimen: Blood from Arm, Right Updated: 01/13/24 0028     Sodium 141 mmol/L      Potassium 2.9 mmol/L      Chloride 104 mmol/L      CO2 28 mmol/L      ANION GAP 9 mmol/L      BUN 14 mg/dL      Creatinine 0.92 mg/dL      Glucose 206 mg/dL      Calcium 8.8 mg/dL      Corrected Calcium 9.4 mg/dL      AST 12 U/L      ALT 6 U/L      Alkaline Phosphatase 61 U/L      Total Protein 7.0 g/dL      Albumin 3.2 g/dL      Total Bilirubin 0.76 mg/dL      eGFR 51 ml/min/1.73sq m     Narrative:      [employer] guidelines for Chronic Kidney Disease (CKD):     Stage 1 with normal or high GFR (GFR > 90 mL/min/1.73 square meters)    Stage 2 Mild CKD (GFR = 60-89 mL/min/1.73 square meters)    Stage 3A Moderate CKD (GFR = 45-59 mL/min/1.73 square meters)    Stage 3B Moderate CKD (GFR = 30-44 mL/min/1.73 square meters)    Stage 4 Severe CKD (GFR = 15-29 mL/min/1.73 square meters)    Stage 5 End Stage CKD (GFR <15 mL/min/1.73 square meters)  Note: GFR calculation is accurate only with a steady state creatinine    Magnesium [[id number]]  (Normal) Collected: 01/12/24 2357    Lab Status: Final result Specimen: Blood from Arm, Right Updated: 01/13/24 0028     Magnesium 1.9 mg/dL     Lactic acid, plasma (w/reflex if result > 2.0) [[id number]]  (Normal) Collected: 01/12/24 0551    Lab Status: Final result Specimen: Blood from Arm, Right Updated: 01/13/24 0027     LACTIC ACID 1.5 mmol/L      Narrative:      Result may be elevated if tourniquet was used during collection.    CBC and differential [[id number]]  (Abnormal) Collected: 01/12/24 2357    Lab Status: Final result Specimen: Blood from Arm, Right Updated: 01/13/24 0013     WBC 15.64 Thousand/uL      RBC 3.54 Million/uL      Hemoglobin 10.8 g/dL      Hematocrit 34.3 %      MCV 97 fL      MCH 30.5 pg      MCHC 31.5 g/dL      RDW 15.5 %      MPV 10.0 fL      Platelets 439 Thousands/uL      nRBC 0 /100 WBCs      Neutrophils Relative 89 %      Immat GRANS % 1 %      Lymphocytes Relative 3 %      Monocytes Relative 6 %      Eosinophils Relative 0 %      Basophils Relative 1 %      Neutrophils Absolute 13.96 Thousands/µL      Immature Grans Absolute 0.10 Thousand/uL      Lymphocytes Absolute 0.53 Thousands/µL      Monocytes Absolute 0.92 Thousand/µL      Eosinophils Absolute 0.05 Thousand/µL      Basophils Absolute 0.08 Thousands/µL     Blood culture #2 [[id number]] Collected: 01/12/24 2357    Lab Status: In process Specimen: Blood from Arm, Right Updated: 01/13/24 0007    Blood culture #1 [[id number]] Collected: 01/12/24 2357    Lab Status: In process Specimen: Blood from Arm, Right Updated: 01/13/24 0007                   PE Study with CT Abdomen and Pelvis with contrast   Final Result by Chau Tang MD (01/13 0402)      1.  No evidence of pulmonary embolus.   2.  Mild patchy opacity in the right lower lobe compatible with pneumonia in the appropriate clinical setting.   3.  Left thyroid nodules measure up to 1.6 cm, recommendations as above.         Workstation performed: [id number]         XR chest 2 views   ED Interpretation by Emory Barrett MD (01/13 0026)   No evidence of acute cardiopulmonary pathology            Procedures  ECG 12 Lead Documentation Only    Date/Time: 1/12/2024 11:31 PM    Performed by: Emory Barrett MD  Authorized by: Emory Barrett MD    Patient location:  ED  Interpretation:     Interpretation: abnormal    Quality:      Tracing quality:  Limited by artifact  Rate:     ECG rate:  102    ECG rate assessment: tachycardic    Rhythm:     Rhythm: sinus rhythm    Ectopy:     Ectopy: none    QRS:     QRS axis:  Normal    QRS intervals:  Wide  Conduction:     Conduction: abnormal      Abnormal conduction: complete RBBB    ST segments:     ST segments:  Normal  T waves:     T waves: normal    Other findings:     Other findings: prolonged qTc interval          ED Course  ED Course as of 01/13/24 0539   Sat Jan 13, 2024   0013 WBC(!): 15.64   0031 Potassium(!): 2.9   0038 hs TnI 0hr(!): 56   0302 D-Dimer, Quant(!): 1.30   0405 PE Study with CT Abdomen and Pelvis with contrast  IMPRESSION:     1.  No evidence of pulmonary embolus.  2.  Mild patchy opacity in the right lower lobe compatible with pneumonia in the appropriate clinical setting.  3.  Left thyroid nodules measure up to 1.6 cm, recommendations as above                                         Medical Decision Making  Pt presenting with worsening cough, hypoxia, and confusion with likely PE vs. ACS vs. Arrhythmia vs. PNA vs. UTI. EKG remarkable for prolonged Qtc which was addressed with magnesium and potassium. Despite supplementation, her Qtc remained prolonged necessitating further workup inpatient. Imaging did not show PE, however, it did show PNA for which she was given rocephin. Pt remained stable while in the department and was admitted to medicine for further workup and management.    Amount and/or Complexity of Data Reviewed  Labs: ordered. Decision-making details documented in ED Course.  Radiology: ordered and independent interpretation performed. Decision-making details documented in ED Course.    Risk  Prescription drug management.  Decision regarding hospitalization.          Disposition  Final diagnoses:   PNA (pneumonia)   Hypokalemia   Long QT interval   AMS (altered mental status)   Hyperglycemia   Leukocytosis   Anemia   Elevated troponin   Elevated d-dimer     Time  reflects when diagnosis was documented in both MDM as applicable and the Disposition within this note       Time User Action Codes Description Comment    1/13/2024  4:07 AM Emory Barrett [J18.9] PNA (pneumonia)     1/13/2024  4:14 AM Emory Barrett [E87.6] Hypokalemia     1/13/2024  4:14 AM Emory Barrett [R94.31] Long QT interval     1/13/2024  4:24 AM Emory Barrett Add [R41.82] AMS (altered mental status)     1/13/2024  4:24 AM Emory Barrett Modify [J18.9] PNA (pneumonia)     1/13/2024  4:24 AM Emory Barrett Modify [R41.82] AMS (altered mental status)     1/13/2024  4:41 AM Racquel Driscoll [R73.9] Hyperglycemia     1/13/2024  4:41 AM Racquel Driscoll [D72.829] Leukocytosis     1/13/2024  4:42 AM Racquel Driscoll [D64.9] Anemia     1/13/2024  4:42 AM Racquel Driscoll [R79.89] Elevated troponin     1/13/2024  4:42 AM Racquel Driscoll [R79.89] Elevated d-dimer           ED Disposition       ED Disposition   Admit    Condition   Stable    Date/Time   Sat Jan 13, 2024  4:41 AM    Comment   Case was discussed with Dr. Daley and the patient's admission status was agreed to be Admission Status: inpatient status to the service of Dr. Daley .               Follow-up Information    None         Patient's Medications    No medications on file     No discharge procedures on file.    PDMP Review       None             ED Provider  Attending physically available and evaluated Grace Clayton. I managed the patient along with the ED Attending.    Electronically Signed by           Emory Barrett MD  01/13/24 8241

## 2024-01-13 NOTE — PLAN OF CARE
Problem: Potential for Falls  Goal: Patient will remain free of falls  Description: INTERVENTIONS:  - Educate patient/family on patient safety including physical limitations  - Instruct patient to call for assistance with activity   - Consult OT/PT to assist with strengthening/mobility   - Keep Call bell within reach  - Keep bed low and locked with side rails adjusted as appropriate  - Keep care items and personal belongings within reach  - Initiate and maintain comfort rounds  - Make Fall Risk Sign visible to staff  - Offer Toileting every 2 Hours, in advance of need  - Initiate/Maintain bed alarm  - Apply yellow socks and bracelet for high fall risk patients  - Consider moving patient to room near nurses station  Outcome: Progressing     Problem: Prexisting or High Potential for Compromised Skin Integrity  Goal: Skin integrity is maintained or improved  Description: INTERVENTIONS:  - Identify patients at risk for skin breakdown  - Assess and monitor skin integrity  - Assess and monitor nutrition and hydration status  - Monitor labs   - Assess for incontinence   - Turn and reposition patient  - Assist with mobility/ambulation  - Relieve pressure over bony prominences  - Avoid friction and shearing  - Provide appropriate hygiene as needed including keeping skin clean and dry  - Evaluate need for skin moisturizer/barrier cream  - Collaborate with interdisciplinary team   - Patient/family teaching  - Consider wound care consult   Outcome: Progressing

## 2024-01-13 NOTE — ED NOTES
Received call from patient's Granddaughter/POA: Molly Hull ([phone]). Patient's plan of care reviewed. Primary nurse updated     Ernestine López RN  01/12/24 7771

## 2024-01-13 NOTE — ASSESSMENT & PLAN NOTE
POA: Only takes Lopressor at home for both PAF and hypertension. Labile BP on admission.    Plan:  Monitor vitals  Continue home Lopressor

## 2024-01-13 NOTE — ED ATTENDING ATTESTATION
1/12/2024  IRacquel MD, saw and evaluated the patient. I have discussed the patient with the resident/non-physician practitioner and agree with the resident's/non-physician practitioner's findings, Plan of Care, and MDM as documented in the resident's/non-physician practitioner's note, except where noted. All available labs and Radiology studies were reviewed.  I was present for key portions of any procedure(s) performed by the resident/non-physician practitioner and I was immediately available to provide assistance.       At this point I agree with the current assessment done in the Emergency Department.  I have conducted an independent evaluation of this patient a history and physical is as follows:    ED Course  ED Course as of 01/13/24 0456   Fri Jan 12, 2024   2327 Pulse(!): 108   2328 SpO2: 91 %   2341 ECG 12 lead  Sinus tachycardia with first-degree AV block, right bundle branch block noted, PAC noted.  No STEMI.  , , QTc 539.    2 g IV magnesium sulfate given for QTc prolongation.   Sat Jan 13, 2024   0000 Grace is a 89-year-old woman brought into the emergency department for worsening altered mental status in the setting of recently diagnosed RSV, worsening coughing and shortness of breath.  History is limited due to patient's baseline dementia and current confusion/agitation.    Her vital signs are remarkable for tachycardia and low SpO2.   0010 Her physical exam is remarkable for diminished breath sounds at the right lower lung base, slight tachypnea.  Otherwise, no apparent respiratory distress.  Some coughing noted.  Heart sounds notable for tachycardia.  Abdomen soft, appears to be tender on palpation.    Clinical presentation puts patient at risk for the following differential diagnoses:    RSV, COVID, influenza, other viral syndrome, pneumonia, pneumothorax, ACS, dehydration, JUSTINA, metabolic disturbance, urinary tract infection, PE, sepsis.  Lab work and imaging was ordered.   0031 K  2.9, repleted.   0148 hs TnI 0hr(!): 56   0329 Second troponin 59, delta 2-hour 3.  Pending repeat EKG.  D-dimer was elevated and CT chest abdomen pelvis was ordered, departmental read is pending at this time.  Abdomen pelvis was included due to tenderness on palpation of the abdomen and patient's poor historical ability.   0330 D-Dimer, Quant(!): 1.30   0330 PROTIME(!): 19.3   0330 POCT INR(!): 1.55   0330 10 mg olanzapine IM given due to increased agitation.   0406 PE Study with CT Abdomen and Pelvis with contrast  1.  No evidence of pulmonary embolus.  2.  Mild patchy opacity in the right lower lobe compatible with pneumonia in the appropriate clinical setting.  3.  Left thyroid nodules measure up to 1.6 cm, recommendations as above.     0443 ECG 12 lead  Repeat EKG shows QTc 522.  Patient is still receiving potassium.  Will need reassessments.   0443 Delta 2hr hsTnI: 3   0444 hs TnI 2hr(!): 59   0444 Hemoglobin(!): 10.8  Baseline   0444 WBC(!): 15.64   0444 Glucose, Random(!): 206   0444 On reassessment, patient is sleeping, comfortable.  Case was discussed with general medicine for admission due to altered mental status worsening in the setting of possible developing pneumonia, tachycardia, hypoxia.  Lab findings with leukocytosis, elevated troponin, elevated D-dimer, hypokalemia, long QTc.         Critical Care Time  Procedures

## 2024-01-13 NOTE — SPEECH THERAPY NOTE
Speech Language Pathology    Speech Language Pathology Bedside Swallow Evaluation      Patient Name: Grace Clayton  Today's Date: 1/13/2024    Recommend:  Mechanical soft diet and Thin liquids  Medications crushed in apple sauce  Feed patient all meals fully upright position  Standard aspiration and reflux precautions    Discussed with patient's CNA and RN re: ST findings and recommendations.  There are no further skilled ST needs at this time.     Problem List  Principal Problem:    Generalized weakness  Active Problems:    Hyperlipidemia    Anxiety    Paroxysmal atrial fibrillation (HCC)    Essential hypertension    Suspected dementia with agitation (HCC)    Hypokalemia    RSV infection with wrosening cough    GERD (gastroesophageal reflux disease)    Non-MI troponin elevation    Past Medical History  History reviewed. No pertinent past medical history.  Past Surgical History     01/13/24 1110   Patient Information   Current Medical 89 year-old female admitted secondary to cough, h/o RSV   Past Medical History HTN, HLD, GERD, and as outlined above   Social History Patient resides at home   Swallow Information   Current Risks for Dysphagia & Aspiration General debilitation; Respiratory compromise   Current Symptoms/Concerns Cough   Current Diet Dysphagia Dental Soft; Thin liquids   Baseline Diet Regular; Thin liquids   Baseline Assessment   Behavior/Cognition Alert;Cooperative   Speech/Language Status Within functional limits   Patient Positioning Upright in bed   Swallow Mechanism Exam   Labial Strength WFL   Labial ROM WFL   Lingual Strength WFL   Lingual ROM WFL   Velum WFL   Mandible WFL   Dentition Edentulous   Volitional Cough Strong; Congested at baseline   Consistencies Assessed and Performance   Materials Administered Puree/Level 1;Mechanical Soft/Level 2;Soft/Level 3;Thin liquid   Oral Stage WFL  Prolonged mastication of soft solids as edentulous   Pharyngeal Stage WFL   Pharyngeal Stage Comment No clinical  overt signs of aspiration following all swallows of soft solids or thin liquid.   Swallow Mechanics WFL   Esophageal Concerns No s/s reported   Strategies and Efficacy Small bites/sips, slow rate   Summary   Swallow Summary Oral and pharyngeal swallowing skills are safe/ WFL for mechanical soft solids and thin/ all liquids.   Recommendations   Risk for Aspiration None   Recommendations Continue oral diet   Diet Solid Recommendation Level 2 Dysphagia/ mechanical soft   Diet Liquid Recommendation Thin liquids   Recommended Form of Medications Crushed with puree bolus   General Precautions Aspiration precautions; Feed only when alert; Minimize distractions; Upright as possible for all oral intakes; Remain upright for 45 minutes after meals; Assist with feeding   Compensatory Swallowing Strategies Alternate solids and liquids;External pacing   Results Reviewed with RN and CNA   Treatment Recommendations   Duration of treatment N/A   Follow up treatments   None currently   Dysphagia Goals   DC ST 1/13   Speech Therapy Prognosis   Prognosis Good

## 2024-01-13 NOTE — ASSESSMENT & PLAN NOTE
POA: Several months of increasing weakness and functional decline with recent RSV infection and acute worsening of weakness about 2 weeks ago. Patient's granddaughter, also has trouble swallowing pills without crushing although states that patient able to tolerate regular diet at home. Suspect multifactorial in the setting of advancing age, recent RSV infection with suspected new pneumonia, underlying undiagnosed dementia, and physical deconditioning.     Plan:  Treat underlying condition  Gentle IV fluid hydration and encourage oral intake   Dysphagia dental soft diet with thin liquids and meds crushed in applesauce for now pending speech/swallow evaluation  Aspiration and fall precautions  PT/OT eval and treat

## 2024-01-13 NOTE — ASSESSMENT & PLAN NOTE
POA: No formal diagnosis of dementia but progressive worsening cognitive function with sundowning symptoms and agitation as per granddaughter report. Mental status at baseline per communication with granddaughter (caretaker/POA). Oriented to person, time and place but confused otherwise.     Plan:  Delirium precautions  Follow-up Geriatric consult

## 2024-01-13 NOTE — QUICK NOTE
89-year-old female admitted for worsening cough and shortness of breath, most likely secondary to a pneumonia on top of a recent viral infection.  Patient was started on IV ceftriaxone and will continue that at this time.  Patient also had low potassium while in the ED, thus it was repleted and she was placed on telemetry for continuous monitoring.  UA mildly concerning for bacteriuria, thus ceftriaxone will cover.  Updated patient's granddaughter over the phone.  Will continue to monitor.

## 2024-01-13 NOTE — H&P
[facility]  H&P- Grace Clayton 1/26/1925, 89 y.o. female MRN: [id number]  Unit/Bed#: ED-33 Encounter: [id number]  Primary Care Provider: No primary care provider on file.   Date and time admitted to hospital: 1/12/2024 10:47 PM      Assessment/Plan:  * Generalized weakness  Assessment & Plan  POA: Several months of increasing weakness and functional decline with recent RSV infection and acute worsening of weakness about 2 weeks ago. Patient's granddaughter, also has trouble swallowing pills without crushing although states that patient able to tolerate regular diet at home. Suspect multifactorial in the setting of advancing age, recent RSV infection with suspected new pneumonia, underlying undiagnosed dementia, and physical deconditioning.     Plan:  Treat underlying condition  Gentle IV fluid hydration and encourage oral intake   Dysphagia dental soft diet with thin liquids and meds crushed in applesauce for now pending speech/swallow evaluation  Aspiration and fall precautions  PT/OT eval and treat      RSV infection with wrosening cough  Assessment & Plan  POA: Tested positive for RSV on 1/3/2023 due to sick contact at home with worsening weakness and increasing cough productive of green sputum.   Afebrile with elevated WBC 15.6 but normal procalcitonin on admission  CTA chest showed no PE but possible new RLL patchy opacity concerning for possible pneumonia.   Suspect RSV with possible viral vs bacterial vs aspiration pneumonia  Received azithromycin 500 mg x 1 and 1L NS bolus in the ED    Plan:  Monitor temperature and WBC  Repeat procalcitonin tomorrow a.m.   F/u blood and sputum culture if able  F/u urine strep and legionella   Follow up repeat COVID/flu/RSV  Will give ceftriaxone IV x 1 now given concern for possible CAP vs aspiration pneumonia, pending repeat procalcitonin in a.m. - consider continuing antibiotics if persistent clinical suspicion    Hypokalemia  Assessment &  Plan  POA: serum K 2.9, received IV KCl in ED. Normal mag. Prolonged Qtc on ECG although uncorrected for widened QRS.     Plan:  Telemetry x 24h  F/u K in a.m. - replete as needed  Avoid OTC prolonging meds if possible, consider repeat ECG when lytes normalize if appropriate    Non-MI troponin elevation  Assessment & Plan  POA: Mildly elevated trops from 50s -> 80s most likely nonMI troponin elevation in the setting of active infection. No ischemic changes on ECG noted and patient denies any chest pain or SOB although sure how reliable given possible underlying dementia.     Plan:  Telemetry monitoring  No further trop Trend indicated at this time      GERD (gastroesophageal reflux disease)  Assessment & Plan  Continue PTA PPI and vitamin B12 supplement    Suspected dementia with agitation (HCC)  Assessment & Plan  POA: No formal diagnosis of dementia but progressive worsening cognitive function with sundowning symptoms and agitation as per granddaughter report. Mental status at baseline per communication with granddaughter (caretaker/POA). Oriented to person, time and place but confused otherwise.     Plan:  Delirium precautions  Follow-up Geriatric consult      Essential hypertension  Assessment & Plan  POA: Only takes Lopressor at home for both PAF and hypertension. Labile BP on admission.    Plan:  Monitor vitals  Continue home Lopressor    Paroxysmal atrial fibrillation (HCC)  Assessment & Plan  POA: Paroxysmal  Afib with VR Controlled. In Sinus rhythm on ECG obtained on admission.  JOANNA?DS?-VASc 4  Rate control: Lopressor 12.5 mg twice daily  Anticoagulation: Eliquis 2.5 mg twice daily    Plan:  Telemetry  Continue PTA Lopressor and Eliquis    Anxiety  Assessment & Plan  Continue PTA Zoloft daily and melatonin nightly    Hyperlipidemia  Assessment & Plan  Continue PTA pravastatin 20 mg daily        VTE Prophylaxis: High Risk (Score >/= 5) - Pharmacological DVT Prophylaxis Ordered: apixaban (Eliquis). Sequential  Compression Devices Ordered.   Code Status: No Order   Discussion with Patient/Family: Updated  via phone. - granddaughter    Anticipated Length of Stay: Inpatient. Patient will be admitted on an inpatient basis with an anticipated length of stay of greater than 2 midnights secondary to  Generalized weakness.    Chief Complaint: Worsening generalized weakness and increasing cough    History of Present Illness:  Grace Clayton is a 89 y.o. female with a PMH of PAF (Lopressor, Eliquis), HLD (pravastatin), anxiety (Zoloft), suspected dementia with cognitive impairment, and GERD (protonics) who presents from home with worsening generalized weakness and increasing cough. Per communication with patient's granddaughter Molly (MARYELLEN), patient has had significant functional decline since hospitalization at [facility] in 08/2023 despite 3-months of STR following discharge. She noted patient started having cough productive of green sputum with significant worsening weakness initially about 2 weeks ago for which patient was seen in the ED on 1/3/2023 and found to have RSV infection on PCR testing but discharged home given hemodynamically stable otherwise. Sick contact at home d/t pt's son was RSV positive. Patient was brought back to the ED earlier on the day of admission for same but she returns shortly following discharge from ED due to her and daughter was not able to even get patient into her home given patient's significant weakness and stairs at home with many steps.     In the ED, patient was hemodynamically stable on RA with elevated WBC 15.6 and CTA chest showed patchy right lower lobe opacity concerning for possible pneumonia. Also noted to have K of 2.9 and received IV repletion in ED. She denied any complaints during my evaluation although confused/unable to provide any reliable history or answer questions appropriately due to possible undiagnosed dementia.     Source/Reliability: the patient's granddaughter  (POA). Patient was unable to provide reliable history due to cognitive impairment/undiagnosed dementia at baseline.     Past Medical and Surgical History:  PMHx: History reviewed. No pertinent past medical history.  PSHx: History reviewed. No pertinent surgical history.  PTA Meds/Allergies: I have personally reviewed all medications and allergies.   Prior to Admission medications    Not on File      No Known Allergies    Family History:   History reviewed. No pertinent family history.     Personal and Social History:  Social History     Tobacco Use    Smoking status: Never    Smokeless tobacco: Never   Substance Use Topics    Alcohol use: Never    Drug use: Never       Review of Systems:   Review of Systems   Unable to perform ROS: Dementia   Respiratory:  Negative for shortness of breath.    Cardiovascular:  Negative for chest pain.   Gastrointestinal:  Negative for abdominal pain.   Genitourinary:  Negative for dysuria.        Objective:   Vitals: Blood Pressure: 164/73 (01/13/24 0315)  Pulse: 96 (01/13/24 0315)  Temperature: 97.7 °F (36.5 °C) (01/12/24 2251)  Temp Source: Oral (01/12/24 2251)  Respirations: 20 (01/13/24 0315)  SpO2: 93 % (01/13/24 0315)    Physical Exam  Constitutional:       General: She is not in acute distress.     Appearance: She is ill-appearing. She is not toxic-appearing.   HENT:      Head: Normocephalic.      Mouth/Throat:      Mouth: Mucous membranes are dry.   Cardiovascular:      Rate and Rhythm: Normal rate and regular rhythm.      Heart sounds: No murmur heard.  Pulmonary:      Effort: No respiratory distress.      Breath sounds: Decreased air movement present. Decreased breath sounds present. No wheezing, rhonchi or rales.   Abdominal:      General: Bowel sounds are decreased. There is no distension.      Tenderness: There is no abdominal tenderness.   Musculoskeletal:         General: No swelling.      Cervical back: Neck supple.   Skin:     General: Skin is warm and dry.       Findings: No lesion or rash.   Neurological:      Mental Status: She is oriented to person, place, and time. She is lethargic.      Motor: Weakness present.      Comments: Oriented to person, time, and knows in hospital but not which one; lethargic/sleepy but opens eyes to voice although unable to answer questions or follow simple commands appropriately          Lab Results: I have reviewed all pertinent results listed below.    Results from last 7 days   Lab Units 01/12/24  2357   WBC Thousand/uL 15.64*   HEMOGLOBIN g/dL 10.8*   HEMATOCRIT % 34.3*   PLATELETS Thousands/uL 439*   NEUTROS PCT % 89*   LYMPHS PCT % 3*   MONOS PCT % 6   EOS PCT % 0     Results from last 7 days   Lab Units 01/12/24  2357   POTASSIUM mmol/L 2.9*   CHLORIDE mmol/L 104   CO2 mmol/L 28   BUN mg/dL 14   CREATININE mg/dL 0.92   CALCIUM mg/dL 8.8   ALK PHOS U/L 61   ALT U/L 6*   AST U/L 12*     Results from last 7 days   Lab Units 01/12/24  2357   INR  1.55*       Micro:         ECG, Imaging and Other Studies Reviewed on Admission:   ECG:  Sinus rhythm with HR 94, PACs/PVCs and RBBB.   Imaging: Reviewed radiology reports from this admission including: chest xray and chest CT scan     ** Please Note: This note has been constructed using a voice recognition system.**

## 2024-01-13 NOTE — ASSESSMENT & PLAN NOTE
POA: Paroxysmal  Afib with VR Controlled. In Sinus rhythm on ECG obtained on admission.  JOANNA?DS?-VASc 4  Rate control: Lopressor 12.5 mg twice daily  Anticoagulation: Eliquis 2.5 mg twice daily    Plan:  Telemetry  Continue PTA Lopressor and Eliquis

## 2024-01-14 LAB
ANION GAP SERPL CALCULATED.3IONS-SCNC: 6 MMOL/L
BUN SERPL-MCNC: 11 MG/DL (ref 5–25)
CALCIUM SERPL-MCNC: 8.1 MG/DL (ref 8.4–10.2)
CHLORIDE SERPL-SCNC: 110 MMOL/L (ref 96–108)
CO2 SERPL-SCNC: 26 MMOL/L (ref 21–32)
CREAT SERPL-MCNC: 0.85 MG/DL (ref 0.6–1.3)
ERYTHROCYTE [DISTWIDTH] IN BLOOD BY AUTOMATED COUNT: 16 % (ref 11.6–15.1)
GFR SERPL CREATININE-BSD FRML MDRD: 57 ML/MIN/1.73SQ M
GLUCOSE SERPL-MCNC: 121 MG/DL (ref 65–140)
GLUCOSE SERPL-MCNC: 125 MG/DL (ref 65–140)
GLUCOSE SERPL-MCNC: 129 MG/DL (ref 65–140)
HCT VFR BLD AUTO: 27.6 % (ref 34.8–46.1)
HGB BLD-MCNC: 8.7 G/DL (ref 11.5–15.4)
MCH RBC QN AUTO: 30.6 PG (ref 26.8–34.3)
MCHC RBC AUTO-ENTMCNC: 31.5 G/DL (ref 31.4–37.4)
MCV RBC AUTO: 97 FL (ref 82–98)
PLATELET # BLD AUTO: 330 THOUSANDS/UL (ref 149–390)
PMV BLD AUTO: 10.5 FL (ref 8.9–12.7)
POTASSIUM SERPL-SCNC: 3.7 MMOL/L (ref 3.5–5.3)
PROCALCITONIN SERPL-MCNC: 0.38 NG/ML
RBC # BLD AUTO: 2.84 MILLION/UL (ref 3.81–5.12)
SODIUM SERPL-SCNC: 142 MMOL/L (ref 135–147)
WBC # BLD AUTO: 16.22 THOUSAND/UL (ref 4.31–10.16)

## 2024-01-14 PROCEDURE — 82948 REAGENT STRIP/BLOOD GLUCOSE: CPT

## 2024-01-14 PROCEDURE — 99232 SBSQ HOSP IP/OBS MODERATE 35: CPT | Performed by: HOSPITALIST

## 2024-01-14 PROCEDURE — 85027 COMPLETE CBC AUTOMATED: CPT

## 2024-01-14 PROCEDURE — 84145 PROCALCITONIN (PCT): CPT

## 2024-01-14 PROCEDURE — 80048 BASIC METABOLIC PNL TOTAL CA: CPT

## 2024-01-14 RX ORDER — SODIUM CHLORIDE, SODIUM GLUCONATE, SODIUM ACETATE, POTASSIUM CHLORIDE, MAGNESIUM CHLORIDE, SODIUM PHOSPHATE, DIBASIC, AND POTASSIUM PHOSPHATE .53; .5; .37; .037; .03; .012; .00082 G/100ML; G/100ML; G/100ML; G/100ML; G/100ML; G/100ML; G/100ML
500 INJECTION, SOLUTION INTRAVENOUS ONCE
Status: COMPLETED | OUTPATIENT
Start: 2024-01-14 | End: 2024-01-15

## 2024-01-14 RX ORDER — SODIUM CHLORIDE, SODIUM GLUCONATE, SODIUM ACETATE, POTASSIUM CHLORIDE, MAGNESIUM CHLORIDE, SODIUM PHOSPHATE, DIBASIC, AND POTASSIUM PHOSPHATE .53; .5; .37; .037; .03; .012; .00082 G/100ML; G/100ML; G/100ML; G/100ML; G/100ML; G/100ML; G/100ML
75 INJECTION, SOLUTION INTRAVENOUS CONTINUOUS
Status: DISPENSED | OUTPATIENT
Start: 2024-01-14 | End: 2024-01-15

## 2024-01-14 RX ADMIN — SODIUM CHLORIDE, SODIUM GLUCONATE, SODIUM ACETATE, POTASSIUM CHLORIDE, MAGNESIUM CHLORIDE, SODIUM PHOSPHATE, DIBASIC, AND POTASSIUM PHOSPHATE 500 ML: .53; .5; .37; .037; .03; .012; .00082 INJECTION, SOLUTION INTRAVENOUS at 23:59

## 2024-01-14 RX ADMIN — METOPROLOL TARTRATE 12.5 MG: 25 TABLET, FILM COATED ORAL at 10:22

## 2024-01-14 RX ADMIN — PRAVASTATIN SODIUM 20 MG: 20 TABLET ORAL at 10:22

## 2024-01-14 RX ADMIN — PANTOPRAZOLE SODIUM 40 MG: 40 TABLET, DELAYED RELEASE ORAL at 10:22

## 2024-01-14 RX ADMIN — CEFTRIAXONE SODIUM 1000 MG: 10 INJECTION, POWDER, FOR SOLUTION INTRAVENOUS at 08:27

## 2024-01-14 RX ADMIN — APIXABAN 2.5 MG: 2.5 TABLET, FILM COATED ORAL at 18:12

## 2024-01-14 RX ADMIN — SERTRALINE 25 MG: 25 TABLET, FILM COATED ORAL at 10:23

## 2024-01-14 RX ADMIN — BENZONATATE 100 MG: 100 CAPSULE ORAL at 15:18

## 2024-01-14 RX ADMIN — CYANOCOBALAMIN TAB 500 MCG 1000 MCG: 500 TAB at 10:22

## 2024-01-14 RX ADMIN — APIXABAN 2.5 MG: 2.5 TABLET, FILM COATED ORAL at 10:21

## 2024-01-14 RX ADMIN — GUAIFENESIN AND DEXTROMETHORPHAN 10 ML: 100; 10 SYRUP ORAL at 05:53

## 2024-01-14 RX ADMIN — GUAIFENESIN AND DEXTROMETHORPHAN 10 ML: 100; 10 SYRUP ORAL at 15:18

## 2024-01-14 RX ADMIN — LIDOCAINE 5% 1 PATCH: 700 PATCH TOPICAL at 12:19

## 2024-01-14 RX ADMIN — BENZONATATE 100 MG: 100 CAPSULE ORAL at 10:22

## 2024-01-14 NOTE — PROGRESS NOTES
[facility]  Progress Note  Name: Grace Clayton I  MRN: [id number]  Unit/Bed#: S -01 I Date of Admission: 1/12/2024   Date of Service: 1/14/2024 I Hospital Day: 1    Assessment/Plan   Non-MI troponin elevation  Assessment & Plan  POA: Mildly elevated trops from 50s -> 80s most likely nonMI troponin elevation in the setting of active infection. No ischemic changes on ECG noted and patient denies any chest pain or SOB although sure how reliable given possible underlying dementia.     Plan:  Telemetry monitoring  No further trop Trend indicated at this time      GERD (gastroesophageal reflux disease)  Assessment & Plan  Continue PTA PPI and vitamin B12 supplement    RSV infection with wrosening cough  Assessment & Plan  POA: Tested positive for RSV on 1/3/2023 due to sick contact at home with worsening weakness and increasing cough productive of green sputum.   Afebrile with elevated WBC 15.6 but normal procalcitonin on admission  CTA chest showed no PE but possible new RLL patchy opacity concerning for possible pneumonia.   Suspect RSV with possible viral vs bacterial vs aspiration pneumonia  Received azithromycin 500 mg x 1, Ceftriaxone x1 and 1L NS bolus in the ED  Repeat proCal 0.38    Plan:  Monitor temperature and WBC  F/u blood and sputum culture if able  F/u urine strep and legionella   Follow up repeat COVID/flu/RSV  C/w Ceftriaxone     Hypokalemia  Assessment & Plan  POA: serum K 2.9, received IV KCl in ED. Normal mag. Prolonged Qtc on ECG although uncorrected for widened QRS.   Today K+ 3.7    Plan:  Monitor BMP  F/u K in a.m. - replete as needed  Avoid OTC prolonging meds if possible, consider repeat ECG when lytes normalize if appropriate    Suspected dementia with agitation (HCC)  Assessment & Plan  POA: No formal diagnosis of dementia but progressive worsening cognitive function with sundowning symptoms and agitation as per granddaughter report. Mental status at baseline per  communication with granddaughter (caretaker/POA). Oriented to person, time and place but confused otherwise.     Plan:  Delirium precautions  Follow-up Geriatric consult      Essential hypertension  Assessment & Plan  POA: Only takes Lopressor at home for both PAF and hypertension. Labile BP on admission.    Plan:  Monitor vitals  Continue home Lopressor    Paroxysmal atrial fibrillation (HCC)  Assessment & Plan  POA: Paroxysmal  Afib with VR Controlled. In Sinus rhythm on ECG obtained on admission.  JOANNA?DS?-VASc 4  Rate control: Lopressor 12.5 mg twice daily  Anticoagulation: Eliquis 2.5 mg twice daily    Plan:  Telemetry  Continue PTA Lopressor and Eliquis    Anxiety  Assessment & Plan  Continue PTA Zoloft daily and melatonin nightly    Hyperlipidemia  Assessment & Plan  Continue PTA pravastatin 20 mg daily    * Generalized weakness  Assessment & Plan  POA: Several months of increasing weakness and functional decline with recent RSV infection and acute worsening of weakness about 2 weeks ago. Patient's granddaughter, also has trouble swallowing pills without crushing although states that patient able to tolerate regular diet at home. Suspect multifactorial in the setting of advancing age, recent RSV infection with suspected new pneumonia, underlying undiagnosed dementia, and physical deconditioning.     Plan:  Treat underlying condition  Gentle IV fluid hydration and encourage oral intake   Dysphagia dental soft diet with thin liquids and meds crushed in applesauce for now pending speech/swallow evaluation  Aspiration and fall precautions  PT/OT eval and treat                 VTE Pharmacologic Prophylaxis:   High Risk (Score >/= 5) - Pharmacological DVT Prophylaxis Ordered: apixaban (Eliquis). Sequential Compression Devices Ordered.    Mobility:   Basic Mobility Inpatient Raw Score: 7  -HLM Goal: 2: Bed activities/Dependent transfer  JH-HLM Achieved: 2: Bed activities/Dependent transfer  HLM Goal NOT achieved.  Continue with multidisciplinary rounding and encourage appropriate mobility to improve upon HLM goals.    Patient Centered Rounds: I performed bedside rounds with nursing staff today.  Discussions with Specialists or Other Care Team Provider: atteneding    Education and Discussions with Family / Patient: Updated  (granddaughter) via phone.    Current Length of Stay: 1 day(s)  Current Patient Status: Inpatient   Discharge Plan: Anticipate discharge in 24-48 hrs to TBD    Code Status: Level 3 - DNAR and DNI    Subjective:   No acute events overnight, pt is hemodynamically stable.  Examined patient at the bedside this a.m., patient is pleasant, no acute distress, demented on the bed baseline oriented to herself, able to answer simple questions.  Stated that she is feeling well and has no new complaints.    Spoke with the patient's group daughter over the phone, she was updated, she informed that she would like to take her grandmother home when she is ready to be discharged, no plans for rehab placement at this moment.      Objective:     Vitals:   Temp (24hrs), Av.7 °F (37.1 °C), Min:98.2 °F (36.8 °C), Max:99.5 °F (37.5 °C)    Temp:  [98.2 °F (36.8 °C)-99.5 °F (37.5 °C)] 98.4 °F (36.9 °C)  HR:  [] 111  Resp:  [18] 18  BP: (121-144)/(59-88) 144/63  SpO2:  [86 %-98 %] 95 %  There is no height or weight on file to calculate BMI.     Input and Output Summary (last 24 hours):     Intake/Output Summary (Last 24 hours) at 2024 1227  Last data filed at 2024 1744  Gross per 24 hour   Intake 240 ml   Output --   Net 240 ml       Physical Exam:   Physical Exam  Vitals and nursing note reviewed.   Constitutional:       General: She is not in acute distress.     Appearance: Normal appearance. She is well-developed.      Comments: Frail appearance, cachectic   HENT:      Head: Normocephalic and atraumatic.   Eyes:      Conjunctiva/sclera: Conjunctivae normal.   Cardiovascular:      Rate and Rhythm:  Normal rate and regular rhythm.      Heart sounds: No murmur heard.  Pulmonary:      Effort: Pulmonary effort is normal. No respiratory distress.      Breath sounds: Normal breath sounds. No wheezing or rales.   Abdominal:      General: There is no distension.      Palpations: Abdomen is soft.      Tenderness: There is no abdominal tenderness. There is no guarding.   Musculoskeletal:         General: No swelling.      Cervical back: Neck supple.   Skin:     General: Skin is warm and dry.      Capillary Refill: Capillary refill takes less than 2 seconds.   Neurological:      Mental Status: She is alert.   Psychiatric:         Mood and Affect: Mood normal.      Comments: Demented at the baseline, able to answer simple questions          Additional Data:     Labs:  Results from last 7 days   Lab Units 01/14/24 0455 01/12/24 2357   WBC Thousand/uL 16.22* 15.64*   HEMOGLOBIN g/dL 8.7* 10.8*   HEMATOCRIT % 27.6* 34.3*   PLATELETS Thousands/uL 330 439*   NEUTROS PCT %  --  89*   LYMPHS PCT %  --  3*   MONOS PCT %  --  6   EOS PCT %  --  0     Results from last 7 days   Lab Units 01/14/24 0455 01/13/24  0713 01/12/24 2357   SODIUM mmol/L 142   < > 141   POTASSIUM mmol/L 3.7   < > 2.9*   CHLORIDE mmol/L 110*   < > 104   CO2 mmol/L 26   < > 28   BUN mg/dL 11   < > 14   CREATININE mg/dL 0.85   < > 0.92   ANION GAP mmol/L 6   < > 9   CALCIUM mg/dL 8.1*   < > 8.8   ALBUMIN g/dL  --   --  3.2*   TOTAL BILIRUBIN mg/dL  --   --  0.76   ALK PHOS U/L  --   --  61   ALT U/L  --   --  6*   AST U/L  --   --  12*   GLUCOSE RANDOM mg/dL 125   < > 206*    < > = values in this interval not displayed.     Results from last 7 days   Lab Units 01/12/24  2357   INR  1.55*     Results from last 7 days   Lab Units 01/14/24  1208   POC GLUCOSE mg/dl 121         Results from last 7 days   Lab Units 01/14/24 0455 01/12/24  2357   LACTIC ACID mmol/L  --  1.5   PROCALCITONIN ng/ml 0.38* 0.12       Lines/Drains:  Invasive Devices       Peripheral  Intravenous Line  Duration             Peripheral IV 01/12/24 Right;Ventral (anterior) Forearm 1 day    Peripheral IV 01/13/24 Dorsal (posterior);Right Hand 1 day                          Imaging: Reviewed radiology reports from this admission including: chest xray and CTA chest and Xray Left shoulder    Recent Cultures (last 7 days):   Results from last 7 days   Lab Units 01/13/24  0640 01/12/24  2357   BLOOD CULTURE   --  No Growth at 24 hrs.  No Growth at 24 hrs.   LEGIONELLA URINARY ANTIGEN  Negative  --        Last 24 Hours Medication List:   Current Facility-Administered Medications   Medication Dose Route Frequency Provider Last Rate    acetaminophen  650 mg Oral Q6H PRN Britt Patel MD      apixaban  2.5 mg Oral BID Britt Patel MD      benzonatate  100 mg Oral TID Britt Patel MD      bisacodyl  10 mg Rectal Daily PRN Britt Patel MD      cefTRIAXone  1,000 mg Intravenous Q24H Linnea Boyce MD 1,000 mg (01/14/24 0827)    cyanocobalamin  1,000 mcg Oral Daily Britt Patel MD      dextromethorphan-guaiFENesin  10 mL Oral Q8H Britt Patel MD      lidocaine  1 patch Topical Daily Linnea Boyce MD      melatonin  6 mg Oral HS Britt Patel MD      menthol-methyl salicylate   Apply externally 4x Daily PRN Linnea Boyce MD      metoprolol tartrate  12.5 mg Oral Q12H On license of [facility] Britt Patel MD      pantoprazole  40 mg Oral QAM Britt Patel MD      pravastatin  20 mg Oral Daily Britt Patel MD      sertraline  25 mg Oral Daily Britt Patel MD          Today, Patient Was Seen By: Zainab Moscoso MD    **Please Note: This note may have been constructed using a voice recognition system.**

## 2024-01-14 NOTE — PLAN OF CARE
Problem: Potential for Falls  Goal: Patient will remain free of falls  Description: INTERVENTIONS:  - Educate patient/family on patient safety including physical limitations  - Instruct patient to call for assistance with activity   - Consult OT/PT to assist with strengthening/mobility   - Keep Call bell within reach  - Keep bed low and locked with side rails adjusted as appropriate  - Keep care items and personal belongings within reach  - Initiate and maintain comfort rounds  - Make Fall Risk Sign visible to staff  - Offer Toileting every 2 Hours, in advance of need  - Initiate/Maintain alarm  - Obtain necessary fall risk management equipment:   - Apply yellow socks and bracelet for high fall risk patients  - Consider moving patient to room near nurses station  Outcome: Progressing     Problem: Prexisting or High Potential for Compromised Skin Integrity  Goal: Skin integrity is maintained or improved  Description: INTERVENTIONS:  - Identify patients at risk for skin breakdown  - Assess and monitor skin integrity  - Assess and monitor nutrition and hydration status  - Monitor labs   - Assess for incontinence   - Turn and reposition patient  - Assist with mobility/ambulation  - Relieve pressure over bony prominences  - Avoid friction and shearing  - Provide appropriate hygiene as needed including keeping skin clean and dry  - Evaluate need for skin moisturizer/barrier cream  - Collaborate with interdisciplinary team   - Patient/family teaching  - Consider wound care consult   Outcome: Progressing

## 2024-01-15 PROBLEM — D64.9 ANEMIA: Status: ACTIVE | Noted: 2024-01-15

## 2024-01-15 PROBLEM — E87.6 HYPOKALEMIA: Status: RESOLVED | Noted: 2024-01-13 | Resolved: 2024-01-15

## 2024-01-15 PROBLEM — R79.89 ELEVATED TROPONIN LEVEL: Status: RESOLVED | Noted: 2024-01-13 | Resolved: 2024-01-15

## 2024-01-15 LAB
ANION GAP SERPL CALCULATED.3IONS-SCNC: 4 MMOL/L
ANION GAP SERPL CALCULATED.3IONS-SCNC: 5 MMOL/L
BACTERIA UR CULT: ABNORMAL
BASE EX.OXY STD BLDV CALC-SCNC: 63.7 % (ref 60–80)
BASE EXCESS BLDV CALC-SCNC: 2.2 MMOL/L
BASOPHILS # BLD AUTO: 0.08 THOUSANDS/ÂΜL (ref 0–0.1)
BASOPHILS NFR BLD AUTO: 1 % (ref 0–1)
BUN SERPL-MCNC: 12 MG/DL (ref 5–25)
BUN SERPL-MCNC: 12 MG/DL (ref 5–25)
CALCIUM SERPL-MCNC: 8.3 MG/DL (ref 8.4–10.2)
CALCIUM SERPL-MCNC: 8.5 MG/DL (ref 8.4–10.2)
CHLORIDE SERPL-SCNC: 109 MMOL/L (ref 96–108)
CHLORIDE SERPL-SCNC: 111 MMOL/L (ref 96–108)
CO2 SERPL-SCNC: 30 MMOL/L (ref 21–32)
CO2 SERPL-SCNC: 30 MMOL/L (ref 21–32)
CREAT SERPL-MCNC: 0.76 MG/DL (ref 0.6–1.3)
CREAT SERPL-MCNC: 0.81 MG/DL (ref 0.6–1.3)
EOSINOPHIL # BLD AUTO: 0.28 THOUSAND/ÂΜL (ref 0–0.61)
EOSINOPHIL NFR BLD AUTO: 2 % (ref 0–6)
ERYTHROCYTE [DISTWIDTH] IN BLOOD BY AUTOMATED COUNT: 16 % (ref 11.6–15.1)
GFR SERPL CREATININE-BSD FRML MDRD: 60 ML/MIN/1.73SQ M
GFR SERPL CREATININE-BSD FRML MDRD: 65 ML/MIN/1.73SQ M
GLUCOSE SERPL-MCNC: 115 MG/DL (ref 65–140)
GLUCOSE SERPL-MCNC: 93 MG/DL (ref 65–140)
HCO3 BLDV-SCNC: 29 MMOL/L (ref 24–30)
HCT VFR BLD AUTO: 26.6 % (ref 34.8–46.1)
HGB BLD-MCNC: 8.1 G/DL (ref 11.5–15.4)
IMM GRANULOCYTES # BLD AUTO: 0.1 THOUSAND/UL (ref 0–0.2)
IMM GRANULOCYTES NFR BLD AUTO: 1 % (ref 0–2)
LYMPHOCYTES # BLD AUTO: 0.97 THOUSANDS/ÂΜL (ref 0.6–4.47)
LYMPHOCYTES NFR BLD AUTO: 8 % (ref 14–44)
MCH RBC QN AUTO: 30.1 PG (ref 26.8–34.3)
MCHC RBC AUTO-ENTMCNC: 30.5 G/DL (ref 31.4–37.4)
MCV RBC AUTO: 99 FL (ref 82–98)
MONOCYTES # BLD AUTO: 0.95 THOUSAND/ÂΜL (ref 0.17–1.22)
MONOCYTES NFR BLD AUTO: 8 % (ref 4–12)
NEUTROPHILS # BLD AUTO: 9.76 THOUSANDS/ÂΜL (ref 1.85–7.62)
NEUTS SEG NFR BLD AUTO: 80 % (ref 43–75)
NRBC BLD AUTO-RTO: 0 /100 WBCS
O2 CT BLDV-SCNC: 8.3 ML/DL
PCO2 BLDV: 57.7 MM HG (ref 42–50)
PH BLDV: 7.32 [PH] (ref 7.3–7.4)
PLATELET # BLD AUTO: 314 THOUSANDS/UL (ref 149–390)
PMV BLD AUTO: 10.1 FL (ref 8.9–12.7)
PO2 BLDV: 35.2 MM HG (ref 35–45)
POTASSIUM SERPL-SCNC: 3.7 MMOL/L (ref 3.5–5.3)
POTASSIUM SERPL-SCNC: 4.7 MMOL/L (ref 3.5–5.3)
RBC # BLD AUTO: 2.69 MILLION/UL (ref 3.81–5.12)
SODIUM SERPL-SCNC: 144 MMOL/L (ref 135–147)
SODIUM SERPL-SCNC: 145 MMOL/L (ref 135–147)
WBC # BLD AUTO: 12.14 THOUSAND/UL (ref 4.31–10.16)

## 2024-01-15 PROCEDURE — 80048 BASIC METABOLIC PNL TOTAL CA: CPT | Performed by: PHARMACIST

## 2024-01-15 PROCEDURE — 99232 SBSQ HOSP IP/OBS MODERATE 35: CPT | Performed by: HOSPITALIST

## 2024-01-15 PROCEDURE — 85025 COMPLETE CBC W/AUTO DIFF WBC: CPT | Performed by: STUDENT IN AN ORGANIZED HEALTH CARE EDUCATION/TRAINING PROGRAM

## 2024-01-15 PROCEDURE — 94762 N-INVAS EAR/PLS OXIMTRY CONT: CPT

## 2024-01-15 PROCEDURE — 99222 1ST HOSP IP/OBS MODERATE 55: CPT | Performed by: NURSE PRACTITIONER

## 2024-01-15 PROCEDURE — 82805 BLOOD GASES W/O2 SATURATION: CPT | Performed by: PHARMACIST

## 2024-01-15 PROCEDURE — 80048 BASIC METABOLIC PNL TOTAL CA: CPT | Performed by: STUDENT IN AN ORGANIZED HEALTH CARE EDUCATION/TRAINING PROGRAM

## 2024-01-15 RX ADMIN — PANTOPRAZOLE SODIUM 40 MG: 40 TABLET, DELAYED RELEASE ORAL at 08:51

## 2024-01-15 RX ADMIN — ACETAMINOPHEN 650 MG: 325 TABLET, FILM COATED ORAL at 13:01

## 2024-01-15 RX ADMIN — BENZONATATE 100 MG: 100 CAPSULE ORAL at 08:51

## 2024-01-15 RX ADMIN — CYANOCOBALAMIN TAB 500 MCG 1000 MCG: 500 TAB at 08:51

## 2024-01-15 RX ADMIN — Medication 6 MG: at 21:07

## 2024-01-15 RX ADMIN — APIXABAN 2.5 MG: 2.5 TABLET, FILM COATED ORAL at 08:51

## 2024-01-15 RX ADMIN — METOPROLOL TARTRATE 12.5 MG: 25 TABLET, FILM COATED ORAL at 08:52

## 2024-01-15 RX ADMIN — BENZONATATE 100 MG: 100 CAPSULE ORAL at 15:51

## 2024-01-15 RX ADMIN — GUAIFENESIN AND DEXTROMETHORPHAN 10 ML: 100; 10 SYRUP ORAL at 22:10

## 2024-01-15 RX ADMIN — METOPROLOL TARTRATE 12.5 MG: 25 TABLET, FILM COATED ORAL at 21:07

## 2024-01-15 RX ADMIN — BENZONATATE 100 MG: 100 CAPSULE ORAL at 21:07

## 2024-01-15 RX ADMIN — PRAVASTATIN SODIUM 20 MG: 20 TABLET ORAL at 08:51

## 2024-01-15 RX ADMIN — GUAIFENESIN AND DEXTROMETHORPHAN 10 ML: 100; 10 SYRUP ORAL at 15:51

## 2024-01-15 RX ADMIN — SODIUM CHLORIDE, SODIUM GLUCONATE, SODIUM ACETATE, POTASSIUM CHLORIDE, MAGNESIUM CHLORIDE, SODIUM PHOSPHATE, DIBASIC, AND POTASSIUM PHOSPHATE 75 ML/HR: .53; .5; .37; .037; .03; .012; .00082 INJECTION, SOLUTION INTRAVENOUS at 00:56

## 2024-01-15 RX ADMIN — SERTRALINE 25 MG: 25 TABLET, FILM COATED ORAL at 08:51

## 2024-01-15 RX ADMIN — CEFTRIAXONE SODIUM 1000 MG: 10 INJECTION, POWDER, FOR SOLUTION INTRAVENOUS at 08:48

## 2024-01-15 RX ADMIN — APIXABAN 2.5 MG: 2.5 TABLET, FILM COATED ORAL at 17:38

## 2024-01-15 NOTE — QUICK NOTE
Text by RN at 1/14/23 5586 stating patient was too lethargic to take medications, VS /44 (manual), HR 65, O2 93% on 3 L, glucose 129. Arousable to painful stimuli, and will state 1-2 words or moan before falling back asleep. Noted to be sleepy throughout most of the daytime hours, though was more interactive and taking pills. Very little to no PO intake during daytime per RN verbal report. UOP measurements based on pads, but unclear if appropriate amount in setting of decreased PO intake/dehydration. Previous admissions in 2023 for dehydration, UTI, failure to thrive    Examined patient at bedside:  HR 70s, SpO2 97-99 on 3 L, mouth breathing, dry oral mucosa  CV: RRR, no murmur, radial and pedal pulses palpable  Lungs: CTAB on anterior exam  Neuro: as above, sleepy    Labs/tests reviewed:  Procalcitonin 0.38 on 1/14 up from 0.12 on admission  WBC 15--16  Cr normal  Hb 8.7 down from 10.8  Urine cx growing > 100K gram negative rods; per chart review patient had UTI growing Klebsiella in July 2023 and discharged on Keflex at [facility]  CT chest showing possible RLL PNA  Echo from 2/2023 showing EF 60-65%.     A/P  Ordered Isolyte 500 mL bolus followed by 75 mL/hr infusion x7 hours for hypotensions in setting of infection and decreased PO intake/FTT  Ordered Purewick for accurate UOP assessment  Ordered VBG and BMP to assess acid/base status in setting of increased lethargy  Continue CTX as ordered; consider broadening coverage and full sepsis workup if febrile or persistently hypotensive despite IVF    1/15/24 0059  Repeat vitals post IVF bolus /86 HR 73, patient more awake, eyes open/talking while obtaining VBG and BMP. Will continue Isolyte at 75 mL/hr and remainder of plan above

## 2024-01-15 NOTE — PLAN OF CARE
Problem: Potential for Falls  Goal: Patient will remain free of falls  Description: INTERVENTIONS:  - Educate patient/family on patient safety including physical limitations  - Instruct patient to call for assistance with activity   - Consult OT/PT to assist with strengthening/mobility   - Keep Call bell within reach  - Keep bed low and locked with side rails adjusted as appropriate  - Keep care items and personal belongings within reach  - Initiate and maintain comfort rounds  - Make Fall Risk Sign visible to staff  - Offer Toileting every 2 Hours, in advance of need  - Initiate/Maintain alarm  - Obtain necessary fall risk management equipment:   - Apply yellow socks and bracelet for high fall risk patients  - Consider moving patient to room near nurses station  Outcome: Progressing     Problem: Prexisting or High Potential for Compromised Skin Integrity  Goal: Skin integrity is maintained or improved  Description: INTERVENTIONS:  - Identify patients at risk for skin breakdown  - Assess and monitor skin integrity  - Assess and monitor nutrition and hydration status  - Monitor labs   - Assess for incontinence   - Turn and reposition patient  - Assist with mobility/ambulation  - Relieve pressure over bony prominences  - Avoid friction and shearing  - Provide appropriate hygiene as needed including keeping skin clean and dry  - Evaluate need for skin moisturizer/barrier cream  - Collaborate with interdisciplinary team   - Patient/family teaching  - Consider wound care consult   Outcome: Progressing     Problem: Nutrition/Hydration-ADULT  Goal: Nutrient/Hydration intake appropriate for improving, restoring or maintaining nutritional needs  Description: Monitor and assess patient's nutrition/hydration status for malnutrition. Collaborate with interdisciplinary team and initiate plan and interventions as ordered.  Monitor patient's weight and dietary intake as ordered or per policy. Utilize nutrition screening tool and  intervene as necessary. Determine patient's food preferences and provide high-protein, high-caloric foods as appropriate.     INTERVENTIONS:  - Monitor oral intake, urinary output, labs, and treatment plans  - Assess nutrition and hydration status and recommend course of action  - Evaluate amount of meals eaten  - Assist patient with eating if necessary   - Allow adequate time for meals  - Recommend/ encourage appropriate diets, oral nutritional supplements, and vitamin/mineral supplements  - Order, calculate, and assess calorie counts as needed  - Recommend, monitor, and adjust tube feedings and TPN/PPN based on assessed needs  - Assess need for intravenous fluids  - Provide specific nutrition/hydration education as appropriate  - Include patient/family/caregiver in decisions related to nutrition  Outcome: Progressing

## 2024-01-15 NOTE — ASSESSMENT & PLAN NOTE
POA: Paroxysmal  Afib with RVR Controlled. In Sinus rhythm on ECG obtained on admission.  JOANNA?DS?-VASc 4  Rate control: Lopressor 12.5 mg twice daily  Anticoagulation: Eliquis 2.5 mg twice daily    Plan:  Continue PTA Lopressor and Eliquis

## 2024-01-15 NOTE — ASSESSMENT & PLAN NOTE
POA: Mildly elevated trops from 50s -> 80s most likely nonMI troponin elevation in the setting of active infection. No ischemic changes on ECG noted and patient denies any chest pain or SOB although sure how reliable given possible underlying dementia.     Plan:  If symptomatic, obtain EKG and troponins.

## 2024-01-15 NOTE — ASSESSMENT & PLAN NOTE
POA: Several months of increasing weakness and functional decline with recent RSV infection and acute worsening of weakness about 2 weeks ago. Patient's granddaughter, also has trouble swallowing pills without crushing although states that patient able to tolerate regular diet at home. Suspect multifactorial in the setting of advancing age, recent RSV infection with suspected new pneumonia, underlying undiagnosed dementia, and physical deconditioning.     Plan:  Gentle IV fluid hydration at 75 ml/hr and encourage oral intake   Diet recommendations: Mechanical soft diet and thin liquids. Medication crushed in applesauce.    Aspiration and fall precautions  PT/OT eval and treat

## 2024-01-15 NOTE — ASSESSMENT & PLAN NOTE
Recent Labs     01/12/24  2357 01/14/24  0455 01/15/24  0604   HGB 10.8* 8.7* 8.1*     Baseline Hgb: 9-10, however most recently in December was 13.   Etiology unclear but hemoglobin trending down during this hospitalization, suspect dilutional as patient has received fluids. Talked to family, okay with monitoring for now instead of doing EGD or colonoscopy. They have consented verbally to transfusion if hemoglobin drops less than 7.    Plan:  Monitor CBC  Transfuse if Hb < 7

## 2024-01-15 NOTE — ASSESSMENT & PLAN NOTE
POA: Tested positive for RSV on 1/3/2023 due to sick contact at home with worsening weakness and increasing cough productive of green sputum.   Afebrile with elevated WBC 15.6 but normal procalcitonin on admission  CTA chest showed no PE but possible new RLL patchy opacity concerning for possible pneumonia.   Legionella, strep pneumo cultures negative. Blood cultures negative at 48 hours. Repeat viral swab negative. Urine cultures grew E. coli, patient being treated with ceftriaxone which provides coverage.  Suspect RSV with possible viral vs bacterial vs aspiration pneumonia. Pro-Anastacio trending up from 0.12-0.38.    Plan:  Monitor temperature and WBC  F/u blood cultures.  Continue with ceftriaxone (day 3).

## 2024-01-15 NOTE — ASSESSMENT & PLAN NOTE
Recent Labs     01/14/24  0455 01/15/24  0051 01/15/24  0604   K 3.7 4.7 3.7       Plan:  Stable, continue to monitor BMP.

## 2024-01-15 NOTE — PROGRESS NOTES
[facility]  Progress Note  Name: Grace Clayton I  MRN: [id number]  Unit/Bed#: S -01 I Date of Admission: 1/12/2024   Date of Service: 1/15/2024 I Hospital Day: 2    Assessment/Plan   * Generalized weakness  Assessment & Plan  POA: Several months of increasing weakness and functional decline with recent RSV infection and acute worsening of weakness about 2 weeks ago. Patient's granddaughter, also has trouble swallowing pills without crushing although states that patient able to tolerate regular diet at home. Suspect multifactorial in the setting of advancing age, recent RSV infection with suspected new pneumonia, underlying undiagnosed dementia, and physical deconditioning.     Plan:  Gentle IV fluid hydration at 75 ml/hr and encourage oral intake   Diet recommendations: Mechanical soft diet and thin liquids. Medication crushed in applesauce.    Aspiration and fall precautions  PT/OT eval and treat      Anemia  Assessment & Plan  Recent Labs     01/12/24  2357 01/14/24  0455 01/15/24  0604   HGB 10.8* 8.7* 8.1*     Baseline Hgb: 9-10, however most recently in December was 13.   Etiology unclear but hemoglobin trending down during this hospitalization, suspect dilutional as patient has received fluids. Talked to family, okay with monitoring for now instead of doing EGD or colonoscopy. They have consented verbally to transfusion if hemoglobin drops less than 7.    Plan:  Monitor CBC  Transfuse if Hb < 7     RSV infection with wrosening cough  Assessment & Plan  POA: Tested positive for RSV on 1/3/2023 due to sick contact at home with worsening weakness and increasing cough productive of green sputum.   Afebrile with elevated WBC 15.6 but normal procalcitonin on admission  CTA chest showed no PE but possible new RLL patchy opacity concerning for possible pneumonia.   Legionella, strep pneumo cultures negative. Blood cultures negative at 48 hours. Repeat viral swab negative. Urine cultures  grew E. coli, patient being treated with ceftriaxone which provides coverage.  Suspect RSV with possible viral vs bacterial vs aspiration pneumonia. Pro-Anastacio trending up from 0.12-0.38.    Plan:  Monitor temperature and WBC  F/u blood cultures.  Continue with ceftriaxone (day 3).    Non-MI troponin elevation  Assessment & Plan  POA: Mildly elevated trops from 50s -> 80s most likely nonMI troponin elevation in the setting of active infection. No ischemic changes on ECG noted and patient denies any chest pain or SOB although sure how reliable given possible underlying dementia.     Plan:  If symptomatic, obtain EKG and troponins.    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 Mental status at baseline per communication with granddaughter (caretaker/POA). Oriented to person, time and place but confused otherwise.     Plan:  Delirium precautions  Follow-up Geriatric consult      Paroxysmal atrial fibrillation (HCC)  Assessment & Plan  POA: Paroxysmal  Afib with RVR Controlled. In Sinus rhythm on ECG obtained on admission.  JOANNA?DS?-VASc 4  Rate control: Lopressor 12.5 mg twice daily  Anticoagulation: Eliquis 2.5 mg twice daily    Plan:  Continue PTA Lopressor and Eliquis    Essential hypertension  Assessment & Plan  POA: Only takes Lopressor at home for both PAF and hypertension. Labile BP on admission.    Plan:  Monitor vitals  Continue home Lopressor    Hypokalemia-resolved as of 1/15/2024  Assessment & Plan  Recent Labs     01/14/24  0455 01/15/24  0051 01/15/24  0604   K 3.7 4.7 3.7       Plan:  Stable, continue to monitor BMP.           VTE Pharmacologic Prophylaxis:   Moderate Risk (Score 3-4) - Pharmacological DVT Prophylaxis Ordered: apixaban (Eliquis).    Mobility:   Basic Mobility Inpatient Raw Score: 7  [facility] Goal: 2: Bed activities/Dependent  transfer  [facility]-HLM Achieved: 2: Bed activities/Dependent transfer  HLM Goal NOT achieved. Continue with multidisciplinary rounding and encourage appropriate mobility to improve upon HLM goals.    Patient Centered Rounds: I performed bedside rounds with nursing staff today.   Discussions with Specialists or Other Care Team Provider: Geriatrics    Education and Discussions with Family / Patient: Updated  (granddaughter) at bedside.      Current Length of Stay: 2 day(s)  Current Patient Status: Inpatient   Discharge Plan: Anticipate discharge in 24-48 hrs to discharge location to be determined pending rehab evaluations.    Code Status: Level 3 - DNAR and DNI    Subjective:   Overnight, patient was lethargic with blood pressure of 104/44, saturating 93% on 3 L.  Her blood pressure did improve with IVF and she was more alert and awake.  Patient is resting comfortably on bedside this morning.  She denies any fever, chills, chest pain, shortness of breath, nausea, vomiting, abdominal pain, and wanted to go home.  She is alert and oriented x 3.    Objective:     Vitals:   Temp (24hrs), Av.6 °F (37 °C), Min:97.6 °F (36.4 °C), Max:99.7 °F (37.6 °C)    Temp:  [97.6 °F (36.4 °C)-99.7 °F (37.6 °C)] 97.6 °F (36.4 °C)  HR:  [] 75  Resp:  [14-20] 18  BP: (104-157)/() 141/109  SpO2:  [91 %-99 %] 92 %  Body mass index is 26.81 kg/m².     Input and Output Summary (last 24 hours):     Intake/Output Summary (Last 24 hours) at 1/15/2024 1124  Last data filed at 1/15/2024 0446  Gross per 24 hour   Intake 480 ml   Output 423 ml   Net 57 ml       Physical Exam:   Physical Exam  Constitutional:       General: She is not in acute distress.     Appearance: Normal appearance.      Comments: Frail, cachectic at baseline   HENT:      Head: Normocephalic and atraumatic.      Mouth/Throat:      Mouth: Mucous membranes are moist.      Pharynx: Oropharynx is clear.   Eyes:      Extraocular Movements: Extraocular movements  intact.      Pupils: Pupils are equal, round, and reactive to light.   Cardiovascular:      Rate and Rhythm: Normal rate and regular rhythm.   Pulmonary:      Effort: Pulmonary effort is normal.      Breath sounds: Normal breath sounds.   Abdominal:      General: Abdomen is flat.      Palpations: Abdomen is soft.   Skin:     General: Skin is warm and dry.      Capillary Refill: Capillary refill takes less than 2 seconds.   Neurological:      General: No focal deficit present.      Mental Status: She is alert and oriented to person, place, and time.          Additional Data:     Labs:  Results from last 7 days   Lab Units 01/15/24  0604   WBC Thousand/uL 12.14*   HEMOGLOBIN g/dL 8.1*   HEMATOCRIT % 26.6*   PLATELETS Thousands/uL 314   NEUTROS PCT % 80*   LYMPHS PCT % 8*   MONOS PCT % 8   EOS PCT % 2     Results from last 7 days   Lab Units 01/15/24  0604 01/13/24  0713 01/12/24  2357   SODIUM mmol/L 144   < > 141   POTASSIUM mmol/L 3.7   < > 2.9*   CHLORIDE mmol/L 109*   < > 104   CO2 mmol/L 30   < > 28   BUN mg/dL 12   < > 14   CREATININE mg/dL 0.76   < > 0.92   ANION GAP mmol/L 5   < > 9   CALCIUM mg/dL 8.3*   < > 8.8   ALBUMIN g/dL  --   --  3.2*   TOTAL BILIRUBIN mg/dL  --   --  0.76   ALK PHOS U/L  --   --  61   ALT U/L  --   --  6*   AST U/L  --   --  12*   GLUCOSE RANDOM mg/dL 93   < > 206*    < > = values in this interval not displayed.     Results from last 7 days   Lab Units 01/12/24  2357   INR  1.55*     Results from last 7 days   Lab Units 01/14/24  2256 01/14/24  1208   POC GLUCOSE mg/dl 129 121         Results from last 7 days   Lab Units 01/14/24  0455 01/12/24  2357   LACTIC ACID mmol/L  --  1.5   PROCALCITONIN ng/ml 0.38* 0.12       Lines/Drains:  Invasive Devices       Peripheral Intravenous Line  Duration             Peripheral IV 01/12/24 Right;Ventral (anterior) Forearm 2 days    Peripheral IV 01/13/24 Dorsal (posterior);Right Hand 2 days              Drain  Duration             External Urinary  Catheter <1 day                          Imaging: No pertinent imaging reviewed.    Recent Cultures (last 7 days):   Results from last 7 days   Lab Units 01/13/24  0640 01/13/24  0425 01/12/24  2357   BLOOD CULTURE   --   --  No Growth at 48 hrs.  No Growth at 48 hrs.   URINE CULTURE   --  >100,000 cfu/ml Escherichia coli*  --    LEGIONELLA URINARY ANTIGEN  Negative  --   --        Last 24 Hours Medication List:   Current Facility-Administered Medications   Medication Dose Route Frequency Provider Last Rate    acetaminophen  650 mg Oral Q6H PRN Britt Patel MD      apixaban  2.5 mg Oral BID Britt Patel MD      benzonatate  100 mg Oral TID Britt Patel MD      bisacodyl  10 mg Rectal Daily PRN Britt Patel MD      cefTRIAXone  1,000 mg Intravenous Q24H Linnea Boyce MD 1,000 mg (01/15/24 0848)    cyanocobalamin  1,000 mcg Oral Daily Britt Patel MD      dextromethorphan-guaiFENesin  10 mL Oral Q8H Britt Patel MD      lidocaine  1 patch Topical Daily Linnea Boyce MD      melatonin  6 mg Oral HS Britt Patel MD      menthol-methyl salicylate   Apply externally 4x Daily PRN Linnea Boyce MD      metoprolol tartrate  12.5 mg Oral Q12H [facility] Britt Patel MD      pantoprazole  40 mg Oral QAM Britt Patel MD      pravastatin  20 mg Oral Daily Britt Patel MD      sertraline  25 mg Oral Daily Britt Patel MD          Today, Patient Was Seen By: Wade De La Garza DO    **Please Note: This note may have been constructed using a voice recognition system.**

## 2024-01-16 LAB
ANION GAP SERPL CALCULATED.3IONS-SCNC: 7 MMOL/L
BUN SERPL-MCNC: 11 MG/DL (ref 5–25)
CALCIUM SERPL-MCNC: 8.7 MG/DL (ref 8.4–10.2)
CHLORIDE SERPL-SCNC: 107 MMOL/L (ref 96–108)
CO2 SERPL-SCNC: 29 MMOL/L (ref 21–32)
CREAT SERPL-MCNC: 0.78 MG/DL (ref 0.6–1.3)
ERYTHROCYTE [DISTWIDTH] IN BLOOD BY AUTOMATED COUNT: 15.8 % (ref 11.6–15.1)
FERRITIN SERPL-MCNC: 428 NG/ML (ref 11–307)
FOLATE SERPL-MCNC: 12.3 NG/ML
GFR SERPL CREATININE-BSD FRML MDRD: 63 ML/MIN/1.73SQ M
GLUCOSE SERPL-MCNC: 117 MG/DL (ref 65–140)
HCT VFR BLD AUTO: 30.8 % (ref 34.8–46.1)
HGB BLD-MCNC: 9.3 G/DL (ref 11.5–15.4)
IRON SATN MFR SERPL: 19 % (ref 15–50)
IRON SERPL-MCNC: 26 UG/DL (ref 50–212)
MCH RBC QN AUTO: 30 PG (ref 26.8–34.3)
MCHC RBC AUTO-ENTMCNC: 30.2 G/DL (ref 31.4–37.4)
MCV RBC AUTO: 99 FL (ref 82–98)
PLATELET # BLD AUTO: 366 THOUSANDS/UL (ref 149–390)
PMV BLD AUTO: 10.2 FL (ref 8.9–12.7)
POTASSIUM SERPL-SCNC: 3.2 MMOL/L (ref 3.5–5.3)
PROCALCITONIN SERPL-MCNC: 0.2 NG/ML
RBC # BLD AUTO: 3.1 MILLION/UL (ref 3.81–5.12)
SODIUM SERPL-SCNC: 143 MMOL/L (ref 135–147)
TIBC SERPL-MCNC: 140 UG/DL (ref 250–450)
TSH SERPL DL<=0.05 MIU/L-ACNC: 1.16 UIU/ML (ref 0.45–4.5)
UIBC SERPL-MCNC: 114 UG/DL (ref 155–355)
VIT B12 SERPL-MCNC: 3895 PG/ML (ref 180–914)
WBC # BLD AUTO: 13.09 THOUSAND/UL (ref 4.31–10.16)

## 2024-01-16 PROCEDURE — 83540 ASSAY OF IRON: CPT | Performed by: INTERNAL MEDICINE

## 2024-01-16 PROCEDURE — 85027 COMPLETE CBC AUTOMATED: CPT | Performed by: INTERNAL MEDICINE

## 2024-01-16 PROCEDURE — 82746 ASSAY OF FOLIC ACID SERUM: CPT | Performed by: INTERNAL MEDICINE

## 2024-01-16 PROCEDURE — 84145 PROCALCITONIN (PCT): CPT

## 2024-01-16 PROCEDURE — 83550 IRON BINDING TEST: CPT | Performed by: INTERNAL MEDICINE

## 2024-01-16 PROCEDURE — 82728 ASSAY OF FERRITIN: CPT | Performed by: INTERNAL MEDICINE

## 2024-01-16 PROCEDURE — 82607 VITAMIN B-12: CPT | Performed by: INTERNAL MEDICINE

## 2024-01-16 PROCEDURE — 80048 BASIC METABOLIC PNL TOTAL CA: CPT | Performed by: INTERNAL MEDICINE

## 2024-01-16 PROCEDURE — 99232 SBSQ HOSP IP/OBS MODERATE 35: CPT | Performed by: NURSE PRACTITIONER

## 2024-01-16 PROCEDURE — 84443 ASSAY THYROID STIM HORMONE: CPT | Performed by: INTERNAL MEDICINE

## 2024-01-16 PROCEDURE — 99232 SBSQ HOSP IP/OBS MODERATE 35: CPT | Performed by: INTERNAL MEDICINE

## 2024-01-16 RX ORDER — GABAPENTIN 100 MG/1
100 CAPSULE ORAL 2 TIMES DAILY
Status: DISCONTINUED | OUTPATIENT
Start: 2024-01-16 | End: 2024-01-17 | Stop reason: HOSPADM

## 2024-01-16 RX ORDER — ESCITALOPRAM OXALATE 10 MG/1
5 TABLET ORAL DAILY
Status: DISCONTINUED | OUTPATIENT
Start: 2024-01-16 | End: 2024-01-17 | Stop reason: HOSPADM

## 2024-01-16 RX ORDER — POTASSIUM CHLORIDE 20 MEQ/1
40 TABLET, EXTENDED RELEASE ORAL ONCE
Status: COMPLETED | OUTPATIENT
Start: 2024-01-16 | End: 2024-01-16

## 2024-01-16 RX ADMIN — ESCITALOPRAM OXALATE 5 MG: 10 TABLET ORAL at 09:08

## 2024-01-16 RX ADMIN — METOPROLOL TARTRATE 12.5 MG: 25 TABLET, FILM COATED ORAL at 09:08

## 2024-01-16 RX ADMIN — ACETAMINOPHEN 650 MG: 325 TABLET, FILM COATED ORAL at 03:52

## 2024-01-16 RX ADMIN — GABAPENTIN 100 MG: 100 CAPSULE ORAL at 18:04

## 2024-01-16 RX ADMIN — APIXABAN 2.5 MG: 2.5 TABLET, FILM COATED ORAL at 18:04

## 2024-01-16 RX ADMIN — POTASSIUM CHLORIDE 40 MEQ: 1500 TABLET, EXTENDED RELEASE ORAL at 09:08

## 2024-01-16 RX ADMIN — BENZONATATE 100 MG: 100 CAPSULE ORAL at 18:04

## 2024-01-16 RX ADMIN — BENZONATATE 100 MG: 100 CAPSULE ORAL at 09:08

## 2024-01-16 RX ADMIN — CYANOCOBALAMIN TAB 500 MCG 1000 MCG: 500 TAB at 09:08

## 2024-01-16 RX ADMIN — PRAVASTATIN SODIUM 20 MG: 20 TABLET ORAL at 09:09

## 2024-01-16 RX ADMIN — GUAIFENESIN AND DEXTROMETHORPHAN 10 ML: 100; 10 SYRUP ORAL at 16:17

## 2024-01-16 RX ADMIN — Medication 6 MG: at 21:59

## 2024-01-16 RX ADMIN — APIXABAN 2.5 MG: 2.5 TABLET, FILM COATED ORAL at 09:08

## 2024-01-16 RX ADMIN — METOPROLOL TARTRATE 12.5 MG: 25 TABLET, FILM COATED ORAL at 21:59

## 2024-01-16 RX ADMIN — PANTOPRAZOLE SODIUM 40 MG: 40 TABLET, DELAYED RELEASE ORAL at 09:08

## 2024-01-16 RX ADMIN — BENZONATATE 100 MG: 100 CAPSULE ORAL at 21:59

## 2024-01-16 RX ADMIN — GUAIFENESIN AND DEXTROMETHORPHAN 10 ML: 100; 10 SYRUP ORAL at 03:52

## 2024-01-16 RX ADMIN — GUAIFENESIN AND DEXTROMETHORPHAN 10 ML: 100; 10 SYRUP ORAL at 23:10

## 2024-01-16 RX ADMIN — CEFTRIAXONE SODIUM 1000 MG: 10 INJECTION, POWDER, FOR SOLUTION INTRAVENOUS at 09:10

## 2024-01-16 NOTE — PROGRESS NOTES
[facility]  Progress Note  Name: Grace Clayton I  MRN: [id number]  Unit/Bed#: S -01 I Date of Admission: 1/12/2024   Date of Service: 1/16/2024 I Hospital Day: 3    Assessment/Plan   * Generalized weakness  Assessment & Plan  POA: Several months of increasing weakness and functional decline with recent RSV infection and acute worsening of weakness about 2 weeks ago. Patient's granddaughter, also has trouble swallowing pills without crushing although states that patient able to tolerate regular diet at home. Suspect multifactorial in the setting of advancing age, recent RSV infection with suspected new pneumonia, underlying undiagnosed dementia, and physical deconditioning.     Plan:  Encourage oral intake   Diet recommendations: Mechanical soft diet and thin liquids. Medication crushed in applesauce.    Aspiration and fall precautions  PT/OT eval and treat, pending rehab placement.      Anemia  Assessment & Plan  Recent Labs     01/14/24  0455 01/15/24  0604 01/16/24  0403   HGB 8.7* 8.1* 9.3*       Baseline Hgb: 9-10, however most recently in December was 13.   Etiology unclear but hemoglobin trending down during this hospitalization, suspect dilutional as patient has received fluids. Talked to family, okay with monitoring for now instead of doing EGD or colonoscopy. They have consented verbally to transfusion if hemoglobin drops less than 7.  TSH within normal range.    Plan:  Follow-up anemia workup: B12, folate, iron, FOBT.  Monitor CBC  Transfuse if Hb < 7     RSV infection with wrosening cough  Assessment & Plan  POA: Tested positive for RSV on 1/3/2023 due to sick contact at home with worsening weakness and increasing cough productive of green sputum.   Afebrile with elevated WBC 15.6 but normal procalcitonin on admission  Pro-Anastacio trending down from 0.38 -0.20.   CTA chest showed no PE but possible new RLL patchy opacity concerning for possible pneumonia.    Legionella, strep  pneumo cultures negative. Blood cultures negative at 48 hours. Repeat viral swab negative. Urine cultures grew E. coli, patient being treated with ceftriaxone which provides coverage.  Suspect RSV with possible viral vs bacterial vs aspiration pneumonia.     Plan:  Monitor temperature and WBC  F/u blood cultures.  Continue with ceftriaxone (day 4). Will transiton to oral cefdinir upon discharge (total 7 days).     Non-MI troponin elevation  Assessment & Plan  POA: Mildly elevated trops from 50s -> 80s most likely nonMI troponin elevation in the setting of active infection. No ischemic changes on ECG noted and patient denies any chest pain or SOB although sure how reliable given possible underlying dementia.     Plan:  If symptomatic, obtain EKG and troponins.    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 Mental status at baseline per communication with granddaughter (caretaker/POA). Oriented to person, time and place but confused otherwise.  Patient was referred to geriatrics back in April but has in and out of hospital rehab thus has not made it to [facility] geriatrics office.    Plan:  Delirium precautions  Per geriatric recommendation, will switch Zoloft with Lexapro 5 mg daily.    Will also add gabapentin 100 mg twice daily, at 2 PM and 9 PM.      Paroxysmal atrial fibrillation (HCC)  Assessment & Plan  POA: Paroxysmal  Afib with RVR Controlled. In Sinus rhythm on ECG obtained on admission.  JOANNA?DS?-VASc 4  Rate control: Lopressor 12.5 mg twice daily  Anticoagulation: Eliquis 2.5 mg twice daily    Plan:  Continue PTA Lopressor and Eliquis    Essential hypertension  Assessment & Plan  POA: Only takes Lopressor at home for both PAF and hypertension. Labile BP on admission.    Plan:  Monitor vitals  Continue home  Lopressor    Hypokalemia  Assessment & Plan  Recent Labs     01/15/24  0051 01/15/24  0604 24  0403   K 4.7 3.7 3.2*         Plan:  Replenished with oral 40 mEq.           VTE Pharmacologic Prophylaxis: VTE Score: 5 High Risk (Score >/= 5) - Pharmacological DVT Prophylaxis Ordered: apixaban (Eliquis). Sequential Compression Devices Ordered.    Mobility:   Basic Mobility Inpatient Raw Score: 7  JH-HLM Goal: 2: Bed activities/Dependent transfer  JH-HLM Achieved: 2: Bed activities/Dependent transfer  HLM Goal achieved. Continue to encourage appropriate mobility.    Patient Centered Rounds: I performed bedside rounds with nursing staff today.   Discussions with Specialists or Other Care Team Provider: Geriatrics    Education and Discussions with Family / Patient: Updated  (granddaughter) via phone.      Current Length of Stay: 3 day(s)  Current Patient Status: Inpatient   Discharge Plan: Anticipate discharge in 24-48 hrs to discharge location to be determined pending rehab evaluations.    Code Status: Level 3 - DNAR and DNI    Subjective:   No significant overnight events.  Patient was seen bedside this morning, was pleasant. She does not have any new complaints, dementia at baseline     Objective:     Vitals:   Temp (24hrs), Av.9 °F (37.2 °C), Min:98.9 °F (37.2 °C), Max:98.9 °F (37.2 °C)    Temp:  [98.9 °F (37.2 °C)] 98.9 °F (37.2 °C)  HR:  [63] 63  Resp:  [17-18] 17  BP: (121-131)/(58-69) 128/69  SpO2:  [95 %] 95 %  Body mass index is 25.97 kg/m².     Input and Output Summary (last 24 hours):     Intake/Output Summary (Last 24 hours) at 2024 1648  Last data filed at 2024 1100  Gross per 24 hour   Intake 120 ml   Output 130 ml   Net -10 ml       Physical Exam:   Physical Exam  Constitutional:       General: She is not in acute distress.     Appearance: Normal appearance.      Comments: Frail, cachectic at baseline   HENT:      Head: Normocephalic and atraumatic.      Mouth/Throat:       Mouth: Mucous membranes are moist.      Pharynx: Oropharynx is clear.   Eyes:      Extraocular Movements: Extraocular movements intact.      Pupils: Pupils are equal, round, and reactive to light.   Cardiovascular:      Rate and Rhythm: Normal rate and regular rhythm.   Pulmonary:      Comments: Decreased lung sounds due to poor respiratory effort.  Abdominal:      General: Abdomen is flat.      Palpations: Abdomen is soft.   Skin:     General: Skin is warm and dry.      Capillary Refill: Capillary refill takes less than 2 seconds.   Neurological:      General: No focal deficit present.      Mental Status: She is alert and oriented to person, place, and time.        Additional Data:     Labs:  Results from last 7 days   Lab Units 01/16/24  0403 01/15/24  0604   WBC Thousand/uL 13.09* 12.14*   HEMOGLOBIN g/dL 9.3* 8.1*   HEMATOCRIT % 30.8* 26.6*   PLATELETS Thousands/uL 366 314   NEUTROS PCT %  --  80*   LYMPHS PCT %  --  8*   MONOS PCT %  --  8   EOS PCT %  --  2     Results from last 7 days   Lab Units 01/16/24  0403 01/13/24  0713 01/12/24  2357   SODIUM mmol/L 143   < > 141   POTASSIUM mmol/L 3.2*   < > 2.9*   CHLORIDE mmol/L 107   < > 104   CO2 mmol/L 29   < > 28   BUN mg/dL 11   < > 14   CREATININE mg/dL 0.78   < > 0.92   ANION GAP mmol/L 7   < > 9   CALCIUM mg/dL 8.7   < > 8.8   ALBUMIN g/dL  --   --  3.2*   TOTAL BILIRUBIN mg/dL  --   --  0.76   ALK PHOS U/L  --   --  61   ALT U/L  --   --  6*   AST U/L  --   --  12*   GLUCOSE RANDOM mg/dL 117   < > 206*    < > = values in this interval not displayed.     Results from last 7 days   Lab Units 01/12/24  2357   INR  1.55*     Results from last 7 days   Lab Units 01/14/24  2256 01/14/24  1208   POC GLUCOSE mg/dl 129 121         Results from last 7 days   Lab Units 01/16/24  0403 01/14/24  0455 01/12/24  2357   LACTIC ACID mmol/L  --   --  1.5   PROCALCITONIN ng/ml 0.20 0.38* 0.12       Lines/Drains:  Invasive Devices       Peripheral Intravenous Line  Duration              Peripheral IV 01/12/24 Right;Ventral (anterior) Forearm 3 days    Peripheral IV 01/13/24 Dorsal (posterior);Right Hand 3 days              Drain  Duration             External Urinary Catheter 1 day                          Imaging: No pertinent imaging reviewed.    Recent Cultures (last 7 days):   Results from last 7 days   Lab Units 01/13/24  0640 01/13/24  0425 01/12/24  2357   BLOOD CULTURE   --   --  No Growth at 72 hrs.  No Growth at 72 hrs.   URINE CULTURE   --  >100,000 cfu/ml Escherichia coli*  10,000-19,000 cfu/ml Escherichia coli*  50,000-59,000 cfu/ml Enterococcus faecalis*  50,000-59,000 cfu/ml Aerococcus urinae*  --    LEGIONELLA URINARY ANTIGEN  Negative  --   --        Last 24 Hours Medication List:   Current Facility-Administered Medications   Medication Dose Route Frequency Provider Last Rate    acetaminophen  650 mg Oral Q6H PRN Britt Patel MD      apixaban  2.5 mg Oral BID Britt Patel MD      benzonatate  100 mg Oral TID Britt Patel MD      bisacodyl  10 mg Rectal Daily PRN Britt Patel MD      cefTRIAXone  1,000 mg Intravenous Q24H Linnea Boyce MD 1,000 mg (01/16/24 0910)    cyanocobalamin  1,000 mcg Oral Daily Britt Patel MD      dextromethorphan-guaiFENesin  10 mL Oral Q8H Britt Patel MD      escitalopram  5 mg Oral Daily Lilian Vargas MD      gabapentin  100 mg Oral BID Lilian Vargas MD      lidocaine  1 patch Topical Daily Linnea Boyce MD      melatonin  6 mg Oral HS Britt Patel MD      menthol-methyl salicylate   Apply externally 4x Daily PRN Linnea Boyce MD      metoprolol tartrate  12.5 mg Oral Q12H [facility] Britt Patel MD      pantoprazole  40 mg Oral QADIEGO Patel MD      pravastatin  20 mg Oral Daily Britt Patel MD          Today, Patient Was Seen By: Wade De La Garza DO    **Please Note: This note may have been constructed using a voice recognition system.**

## 2024-01-16 NOTE — PROGRESS NOTES
Progress Note - Geriatric Medicine   Grace Clayton 89 y.o. female MRN: [id number]  Unit/Bed#: S -01 Encounter: [id number]      Assessment/Plan:  Cognitive decline  Patient is alert oriented to person, place-disoriented to situation  No formal diagnosis of dementia or cognitive impairment, no cognitive testing on file  Vitamin B12 3895 and TSH level 1.157  Given history provided by friend to suspect underlying cognitive impairment  Patient was previously referred to [facility] geriatrics, but patient was not seen in the office due to frequent hospitalizations and being in rehab for extended period of time  Referral was placed yesterday for Senior care office  Patient switch from Zoloft to Lexapro and gabapentin 100 mg twice daily was added to help with pain and anxiety  At risk for delirium due to hospitalization, medications, sleep disturbance, likely undiagnosed cognitive impairment  Recommend delirium precautions  Maintain sleep-wake cycle, avoid nighttime interruptions  minimize overnight interruptions, group overnight vitals/labs/nursing checks as possible  dim lights, close blinds and turn off tv to minimize stimulation and encourage sleep environment in evenings  Provide adequate pain control  Avoid urinary retention and constipation  Provide frequent and early mobilization  Provide frequent redirection and reorientation as needed  Avoid medications that may worsen or precipitate delirium such as tramadol, benzodiazepines, anticholinergics, and Benadryl  Redirect unwanted behaviors as first-line therapy, avoid physical restraints as able to  Continue to monitor    Anxiety/depression  Patient's granddaughter reports anxiety at home that gets worse starting in the afternoon and overnight  Patient was on Zoloft 25 mg daily, has since been switched to Lexapro 5 mg daily  Gabapentin 100 mg at 2 PM and 9 PM added to help with sleep, anxiety, and pain  Continue to provide emotional support    Insomnia  First-line  treatment is behavior modification  Maintain sleep-wake cycle, avoid nighttime interruptions  Avoid caffeine throughout the day  Avoid napping throughout the day  Encourage physical activity throughout the day  Avoid sedative hypnotics including benzodiazepines and benadryl  Continue melatonin 6 mg nightly    Pain  Mahi to have reported some chronic pain and osteoarthritis  Primarily in shoulders and left hand for which her granddaughter gives her as needed Tylenol  Currently has lidocaine patch ordered for the left shoulder  Gabapentin 100 mg twice daily was also ordered that could help with pain and anxiety    Ambulatory dysfunction  At a baseline ambulates with walker, although frequently just hold onto her granddaughter or family members to get up and move  PT/OT following  Fall precautions  Out of bed as tolerated  Encourage early and frequent mobilization  Encourage adequate hydration and nutrition  Provide adequate pain management   Goal is discharge home with granddaughter  Continue with PT/OT for continued strength and balance training     Subjective:   Grace is a 89-year-old female being seen for a geriatrics follow-up.  Upon examination patient was lying in bed, resting.  She appeared comfortable and was in no acute distress.  She denied any pain on exam.  Appetite is slight decreased.  Per nursing she has been calm and cooperative, no acute concerns or issues at this time.    Called and updated patient's granddaughter Molly on medication changes of Zoloft to Lexapro and adding gabapentin 100 mg twice daily.    Review of Systems   Reason unable to perform ROS: limited by mental status.   Constitutional:  Positive for activity change and appetite change. Negative for chills, fatigue and fever.   HENT:  Negative for sore throat and trouble swallowing.    Eyes:  Negative for pain and visual disturbance.   Respiratory:  Positive for cough. Negative for shortness of breath.    Cardiovascular:  Negative for  "chest pain and palpitations.   Gastrointestinal:  Negative for abdominal pain, constipation, diarrhea, nausea and vomiting.   Genitourinary:  Negative for difficulty urinating, dysuria and hematuria.   Musculoskeletal:  Positive for arthralgias and gait problem. Negative for back pain.   Skin:  Negative for color change and rash.   Neurological:  Positive for weakness. Negative for dizziness, light-headedness and headaches.   Psychiatric/Behavioral:  Positive for confusion and sleep disturbance. Negative for dysphoric mood. The patient is not nervous/anxious.          Objective:     Vitals: Blood pressure 121/58, pulse 63, temperature 98.9 °F (37.2 °C), resp. rate 18, height 5' 2\" (1.575 m), weight 64.4 kg (141 lb 15.6 oz), SpO2 95%.,Body mass index is 25.97 kg/m².      Intake/Output Summary (Last 24 hours) at 1/16/2024 1243  Last data filed at 1/16/2024 1100  Gross per 24 hour   Intake 120 ml   Output 130 ml   Net -10 ml       Current Medications: Reviewed    Physical Exam:   Physical Exam  Vitals reviewed.   Constitutional:       General: She is not in acute distress.     Appearance: She is well-developed. She is not ill-appearing.      Comments: Frail-appearing   HENT:      Head: Normocephalic and atraumatic.   Cardiovascular:      Rate and Rhythm: Normal rate and regular rhythm.      Heart sounds: No murmur heard.  Pulmonary:      Effort: Pulmonary effort is normal. No respiratory distress.      Comments: Lung sounds diminished bilaterally  Abdominal:      General: Bowel sounds are normal. There is no distension.      Palpations: Abdomen is soft.      Tenderness: There is no abdominal tenderness.   Musculoskeletal:         General: No swelling.      Right lower leg: No edema.      Left lower leg: No edema.   Skin:     General: Skin is warm and dry.      Coloration: Skin is pale.   Neurological:      General: No focal deficit present.      Mental Status: She is alert. Mental status is at baseline.      Cranial " "Nerves: No cranial nerve deficit.      Motor: Weakness present.      Gait: Gait abnormal.      Comments: Patient is alert oriented to person, partial place , disoriented to situation and time   Psychiatric:         Mood and Affect: Mood normal.          Invasive Devices       Peripheral Intravenous Line  Duration             Peripheral IV 01/12/24 Right;Ventral (anterior) Forearm 3 days    Peripheral IV 01/13/24 Dorsal (posterior);Right Hand 3 days              Drain  Duration             External Urinary Catheter 1 day                    Lab, Imaging and other studies:    Lab Results:   I have personally reviewed pertinent lab results including the following:  -CBC, BMP, TSH, vitamin B12, procalcitonin    I have personally reviewed the following imaging study reports in PACS:  No new imaging to review      - I have personally reviewed pertinent reports.      Please note:  Voice-recognition software may have been used in the preparation of this document.  Occasional wrong word or \"sound-alike\" substitutions may have occurred due to the inherent limitations of voice recognition software.  Interpretation should be guided by context.    "

## 2024-01-16 NOTE — H&P
[facility]  H&P  Name: Grace Clayton 89 y.o. female I MRN: [id number]  Unit/Bed#: S -01 I Date of Admission: 1/12/2024   Date of Service: 1/16/2024 I Hospital Day: 3      Assessment/Plan   * Generalized weakness  Assessment & Plan  POA: Several months of increasing weakness and functional decline with recent RSV infection and acute worsening of weakness about 2 weeks ago. Patient's granddaughter, also has trouble swallowing pills without crushing although states that patient able to tolerate regular diet at home. Suspect multifactorial in the setting of advancing age, recent RSV infection with suspected new pneumonia, underlying undiagnosed dementia, and physical deconditioning.     Plan:  Encourage oral intake   Diet recommendations: Mechanical soft diet and thin liquids. Medication crushed in applesauce.    Aspiration and fall precautions  PT/OT eval and treat, pending rehab placement.      Anemia  Assessment & Plan  Recent Labs     01/14/24  0455 01/15/24  0604 01/16/24  0403   HGB 8.7* 8.1* 9.3*     Baseline Hgb: 9-10, however most recently in December was 13.   Etiology unclear but hemoglobin trending down during this hospitalization, suspect dilutional as patient has received fluids. Talked to family, okay with monitoring for now instead of doing EGD or colonoscopy. They have consented verbally to transfusion if hemoglobin drops less than 7.  TSH within normal range.    Plan:  Follow-up anemia workup: B12, folate, iron, FOBT.  Monitor CBC  Transfuse if Hb < 7     RSV infection with wrosening cough  Assessment & Plan  POA: Tested positive for RSV on 1/3/2023 due to sick contact at home with worsening weakness and increasing cough productive of green sputum.   Afebrile with elevated WBC 15.6 but normal procalcitonin on admission  Pro-Anastacio trending down from 0.38 -0.20.   CTA chest showed no PE but possible new RLL patchy opacity concerning for possible pneumonia.    Legionella, strep  pneumo cultures negative. Blood cultures negative at 48 hours. Repeat viral swab negative. Urine cultures grew E. coli, patient being treated with ceftriaxone which provides coverage.  Suspect RSV with possible viral vs bacterial vs aspiration pneumonia.     Plan:  Monitor temperature and WBC  F/u blood cultures.  Continue with ceftriaxone (day 4). Will transiton to oral cefdinir upon discharge (total 7 days).     Non-MI troponin elevation  Assessment & Plan  POA: Mildly elevated trops from 50s -> 80s most likely nonMI troponin elevation in the setting of active infection. No ischemic changes on ECG noted and patient denies any chest pain or SOB although sure how reliable given possible underlying dementia.     Plan:  If symptomatic, obtain EKG and troponins.    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 Mental status at baseline per communication with granddaughter (caretaker/POA). Oriented to person, time and place but confused otherwise.  Patient was referred to geriatrics back in April but has in and out of hospital rehab thus has not made it to [facility] geriatrics office.    Plan:  Delirium precautions  Per geriatric recommendation, will switch Zoloft with Lexapro 5 mg daily.    Will also add gabapentin 100 mg twice daily, at 2 PM and 9 PM.      Paroxysmal atrial fibrillation (HCC)  Assessment & Plan  POA: Paroxysmal  Afib with RVR Controlled. In Sinus rhythm on ECG obtained on admission.  JOANNA?DS?-VASc 4  Rate control: Lopressor 12.5 mg twice daily  Anticoagulation: Eliquis 2.5 mg twice daily    Plan:  Continue PTA Lopressor and Eliquis    Essential hypertension  Assessment & Plan  POA: Only takes Lopressor at home for both PAF and hypertension. Labile BP on admission.    Plan:  Monitor vitals  Continue home  Lopressor    Hypokalemia  Assessment & Plan  Recent Labs     01/15/24  0051 01/15/24  0604 24  0403   K 4.7 3.7 3.2*         Plan:  Replenished with oral 40 mEq.           VTE Pharmacologic Prophylaxis: VTE Score: 5 High Risk (Score >/= 5) - Pharmacological DVT Prophylaxis Ordered: apixaban (Eliquis). Sequential Compression Devices Ordered.    Mobility:   Basic Mobility Inpatient Raw Score: 7  JH-HLM Goal: 2: Bed activities/Dependent transfer  JH-HLM Achieved: 2: Bed activities/Dependent transfer  HLM Goal achieved. Continue to encourage appropriate mobility.    Patient Centered Rounds: I performed bedside rounds with nursing staff today.   Discussions with Specialists or Other Care Team Provider: Geriatrics    Education and Discussions with Family / Patient: Updated  (grand daughter) via phone.      Current Length of Stay: 3 day(s)  Current Patient Status: Inpatient   Discharge Plan: Anticipate discharge in 24-48 hrs to discharge location to be determined pending rehab evaluations.    Code Status: Level 3 - DNAR and DNI    Subjective:   No significant overnight events.  Patient was seen bedside this morning, was pleasant. She does not have any new complaints, dementia at baseline.    Objective:     Vitals:   Temp (24hrs), Av.7 °F (37.1 °C), Min:98.5 °F (36.9 °C), Max:98.9 °F (37.2 °C)    Temp:  [98.5 °F (36.9 °C)-98.9 °F (37.2 °C)] 98.9 °F (37.2 °C)  HR:  [63-70] 63  Resp:  [18] 18  BP: (112-131)/(58-61) 121/58  SpO2:  [95 %] 95 %  Body mass index is 25.97 kg/m².     Input and Output Summary (last 24 hours):     Intake/Output Summary (Last 24 hours) at 2024 1254  Last data filed at 2024 1100  Gross per 24 hour   Intake 120 ml   Output 130 ml   Net -10 ml       Physical Exam:   Physical Exam  Constitutional:       General: She is not in acute distress.     Appearance: Normal appearance.      Comments: Frail, cachectic at baseline   HENT:      Head: Normocephalic and atraumatic.       Mouth/Throat:      Mouth: Mucous membranes are moist.      Pharynx: Oropharynx is clear.   Eyes:      Extraocular Movements: Extraocular movements intact.      Pupils: Pupils are equal, round, and reactive to light.   Cardiovascular:      Rate and Rhythm: Normal rate and regular rhythm.   Pulmonary:      Comments: Decreased lung sounds due to poor respiratory effort.  Abdominal:      General: Abdomen is flat.      Palpations: Abdomen is soft.   Skin:     General: Skin is warm and dry.      Capillary Refill: Capillary refill takes less than 2 seconds.   Neurological:      General: No focal deficit present.      Mental Status: She is alert and oriented to person, place, and time.          Additional Data:     Labs:  Results from last 7 days   Lab Units 01/16/24  0403 01/15/24  0604   WBC Thousand/uL 13.09* 12.14*   HEMOGLOBIN g/dL 9.3* 8.1*   HEMATOCRIT % 30.8* 26.6*   PLATELETS Thousands/uL 366 314   NEUTROS PCT %  --  80*   LYMPHS PCT %  --  8*   MONOS PCT %  --  8   EOS PCT %  --  2     Results from last 7 days   Lab Units 01/16/24  0403 01/13/24  0713 01/12/24  2357   SODIUM mmol/L 143   < > 141   POTASSIUM mmol/L 3.2*   < > 2.9*   CHLORIDE mmol/L 107   < > 104   CO2 mmol/L 29   < > 28   BUN mg/dL 11   < > 14   CREATININE mg/dL 0.78   < > 0.92   ANION GAP mmol/L 7   < > 9   CALCIUM mg/dL 8.7   < > 8.8   ALBUMIN g/dL  --   --  3.2*   TOTAL BILIRUBIN mg/dL  --   --  0.76   ALK PHOS U/L  --   --  61   ALT U/L  --   --  6*   AST U/L  --   --  12*   GLUCOSE RANDOM mg/dL 117   < > 206*    < > = values in this interval not displayed.     Results from last 7 days   Lab Units 01/12/24  2357   INR  1.55*     Results from last 7 days   Lab Units 01/14/24  2256 01/14/24  1208   POC GLUCOSE mg/dl 129 121         Results from last 7 days   Lab Units 01/16/24  0403 01/14/24  0455 01/12/24  2357   LACTIC ACID mmol/L  --   --  1.5   PROCALCITONIN ng/ml 0.20 0.38* 0.12       Lines/Drains:  Invasive Devices       Peripheral  Intravenous Line  Duration             Peripheral IV 01/12/24 Right;Ventral (anterior) Forearm 3 days    Peripheral IV 01/13/24 Dorsal (posterior);Right Hand 3 days              Drain  Duration             External Urinary Catheter 1 day                          Imaging: No pertinent imaging reviewed.    Recent Cultures (last 7 days):   Results from last 7 days   Lab Units 01/13/24  0640 01/13/24  0425 01/12/24  2357   BLOOD CULTURE   --   --  No Growth at 72 hrs.  No Growth at 72 hrs.   URINE CULTURE   --  >100,000 cfu/ml Escherichia coli*  10,000-19,000 cfu/ml Escherichia coli*  50,000-59,000 cfu/ml Enterococcus faecalis*  50,000-59,000 cfu/ml Aerococcus urinae*  --    LEGIONELLA URINARY ANTIGEN  Negative  --   --        Last 24 Hours Medication List:   Current Facility-Administered Medications   Medication Dose Route Frequency Provider Last Rate    acetaminophen  650 mg Oral Q6H PRN Britt Patel MD      apixaban  2.5 mg Oral BID Britt Patel MD      benzonatate  100 mg Oral TID Britt Patel MD      bisacodyl  10 mg Rectal Daily PRN Britt Patel MD      cefTRIAXone  1,000 mg Intravenous Q24H Linnea Boyce MD 1,000 mg (01/16/24 0910)    cyanocobalamin  1,000 mcg Oral Daily Britt Patel MD      dextromethorphan-guaiFENesin  10 mL Oral Q8H Britt Patel MD      escitalopram  5 mg Oral Daily Lilian Vargas MD      gabapentin  100 mg Oral BID Lilian Vargas MD      lidocaine  1 patch Topical Daily Linnea Boyce MD      melatonin  6 mg Oral HS Britt Patel MD      menthol-methyl salicylate   Apply externally 4x Daily PRN Linnea Boyce MD      metoprolol tartrate  12.5 mg Oral Q12H [facility] Britt Patel MD      pantoprazole  40 mg Oral QAM Britt Patel MD      pravastatin  20 mg Oral Daily Britt Patel MD          Today, Patient Was Seen By: Wade De La Garza DO    **Please Note: This note may have been constructed using a voice recognition system.**

## 2024-01-16 NOTE — ASSESSMENT & PLAN NOTE
Recent Labs     01/14/24  0455 01/15/24  0604 01/16/24  0403   HGB 8.7* 8.1* 9.3*       Baseline Hgb: 9-10, however most recently in December was 13.   Etiology unclear but hemoglobin trending down during this hospitalization, suspect dilutional as patient has received fluids. Talked to family, okay with monitoring for now instead of doing EGD or colonoscopy. They have consented verbally to transfusion if hemoglobin drops less than 7.  TSH within normal range.    Plan:  Follow-up anemia workup: B12, folate, iron, FOBT.  Monitor CBC  Transfuse if Hb < 7

## 2024-01-16 NOTE — ASSESSMENT & PLAN NOTE
Recent Labs     01/15/24  0051 01/15/24  0604 01/16/24  0403   K 4.7 3.7 3.2*         Plan:  Replenished with oral 40 mEq.

## 2024-01-16 NOTE — ASSESSMENT & PLAN NOTE
POA: Tested positive for RSV on 1/3/2023 due to sick contact at home with worsening weakness and increasing cough productive of green sputum.   Afebrile with elevated WBC 15.6 but normal procalcitonin on admission  Pro-Anastacio trending down from 0.38 -0.20.   CTA chest showed no PE but possible new RLL patchy opacity concerning for possible pneumonia.    Legionella, strep pneumo cultures negative. Blood cultures negative at 48 hours. Repeat viral swab negative. Urine cultures grew E. coli, patient being treated with ceftriaxone which provides coverage.  Suspect RSV with possible viral vs bacterial vs aspiration pneumonia.     Plan:  Monitor temperature and WBC  F/u blood cultures.  Continue with ceftriaxone (day 4). Will transiton to oral cefdinir upon discharge (total 7 days).

## 2024-01-16 NOTE — ASSESSMENT & PLAN NOTE
POA: Paroxysmal  Afib with RVR Controlled. In Sinus rhythm on ECG obtained on admission.  [id number] 4  Rate control: Lopressor 12.5 mg twice daily  Anticoagulation: Eliquis 2.5 mg twice daily    Plan:  Continue PTA Lopressor and Eliquis

## 2024-01-16 NOTE — CASE MANAGEMENT
Case Management Discharge Planning Note    Patient name Grace García S /S -01 MRN [id number]  : 1925 Date 2024       Current Admission Date: 2024  Current Admission Diagnosis:Generalized weakness   Patient Active Problem List    Diagnosis Date Noted    Anemia 01/15/2024    Suspected dementia with agitation (HCC) 2024    Hypokalemia 2024    RSV infection with wrosening cough 2024    GERD (gastroesophageal reflux disease) 2024    Non-MI troponin elevation 2024    Generalized weakness 2023    Paroxysmal atrial fibrillation (HCC) 2023    Ambulatory dysfunction 2023    Anxiety 2023    Hyperlipidemia 2012    Essential hypertension 2012      LOS (days): 3  Geometric Mean LOS (GMLOS) (days): 3.6  Days to GMLOS:0.3     OBJECTIVE:  Risk of Unplanned Readmission Score: 17.14         Current admission status: Inpatient   Preferred Pharmacy:   [id number] [address] [address]  Phone: [phone] Fax: [phone]    Primary Care Provider: No primary care provider on file.    Primary Insurance: MEDICARE  Secondary Insurance: UNITED AMERICAN INSURANCE    DISCHARGE DETAILS:    Discharge planning discussed with:: patient's granddaughter Molly  Freedom of Choice: Yes  Comments - Freedom of Choice: Granddaughter remains refusing STR if rcmd. [facility] confirmed for SOC upon d/c to home - agency reserved in aidin. [facility] provided email address, [email or web address], for POA documentation re: pt. CM sent [employer] email for POA forms. Transport requested for 1030 AM tomorrow morning for pt return to home - all parties aware of same. CM to continue to follow for confirmation of pt transport to home when ready.  CM contacted family/caregiver?: Yes  Were Treatment Team discharge recommendations reviewed with patient/caregiver?: Yes  Did patient/caregiver  verbalize understanding of patient care needs?: Yes  Were patient/caregiver advised of the risks associated with not following Treatment Team discharge recommendations?: Yes    Contacts  Patient Contacts: Molly Braden  Relationship to Patient:: Family  Contact Method: Phone  Phone Number: [phone]  Reason/Outcome: Continuity of Care, Emergency Contact, Discharge Planning, Referral              Other Referral/Resources/Interventions Provided:  Interventions: Transportation  Referral Comments: Transport requested for 1030 AM tomorrow morning for pt return to home - all parties aware of same            Discharge Destination Plan:: Home with Home Health Care  Transport at Discharge : [facility]        Transported by (Company and Unit #): pending confirmation  ETA of Transport (Date): 01/16/24  ETA of Transport (Time): 1030           IMM reviewed with patient's caregiver, patient's caregiver agrees with discharge determination.  IMM Given (Date):: 01/17/24  IMM Given to:: Family  Family notified:: Molly (granddaughter)

## 2024-01-16 NOTE — CASE MANAGEMENT
Case Management Assessment & Discharge Planning Note    Patient name Grace EVANGELISTA /S -01 MRN [id number]  : 1925 Date 2024       Current Admission Date: 2024  Current Admission Diagnosis:Generalized weakness   Patient Active Problem List    Diagnosis Date Noted    Anemia 01/15/2024    Suspected dementia with agitation (HCC) 2024    Hypokalemia 2024    RSV infection with wrosening cough 2024    GERD (gastroesophageal reflux disease) 2024    Non-MI troponin elevation 2024    Generalized weakness 2023    Paroxysmal atrial fibrillation (HCC) 2023    Ambulatory dysfunction 2023    Anxiety 2023    Hyperlipidemia 2012    Essential hypertension 2012      LOS (days): 3  Geometric Mean LOS (GMLOS) (days): 3.6  Days to GMLOS:0.3     OBJECTIVE:    Risk of Unplanned Readmission Score: 16.92         Current admission status: Inpatient       Preferred Pharmacy:   [id number] [address] [address]  Phone: [phone] Fax: [phone]    Primary Care Provider: No primary care provider on file.    Primary Insurance: MEDICARE  Secondary Insurance: UNITED AMERICAN INSURANCE    ASSESSMENT:  Active Health Care Proxies    There are no active Health Care Proxies on file.       Advance Directives  Does patient have a Health Care POA?: Yes  Primary Contact: Molly Braden         Readmission Root Cause  30 Day Readmission: No    Patient Information  Admitted from:: Home  Mental Status: Confused  During Assessment patient was accompanied by: Not accompanied during assessment  Assessment information provided by:: Other - please comment (Granddaughter)  Primary Caregiver: Family  Caregiver's Name:: Molly Sampson  Caregiver's Relationship to Patient:: Family Member  Caregiver's Telephone Number:: [phone]  Support Systems: Other (Comment)  (Granddaughter)  County of Residence: [place]  What city do you live in?: [place]  Home entry access options. Select all that apply.: Stairs  Number of steps to enter home.: 5  Type of Current Residence: [facility]  Living Arrangements: Other (Comment) (Granddaughter)  Is patient a ?: No    Activities of Daily Living Prior to Admission  Functional Status: Assistance  Completes ADLs independently?: No  Level of ADL dependence: Assistance  Ambulates independently?: No  Level of ambulatory dependence: Assistance  Does patient use assisted devices?: Yes  Assisted Devices (DME) used: Walker, Shower Chair, Hospital Bed  Does patient currently own DME?: Yes  What DME does the patient currently own?: Hospital Bed, Shower Chair, Walker  Does patient have a history of Outpatient Therapy (PT/OT)?: Yes  Does the patient have a history of Short-Term Rehab?: Yes  Does patient have a history of HHC?: Yes ([facility])  Does patient currently have HHC?: No         Patient Information Continued  Income Source: Pension/half-way  Does patient have prescription coverage?: Yes  Does patient receive dialysis treatments?: No  Does patient have a history of substance abuse?: No  Does patient have a history of Mental Health Diagnosis?: No    PHQ 2/9 Screening   Reviewed PHQ 2/9 Depression Screening Score?: No    Means of Transportation  Means of Transport to Appts:: Family transport      Housing Stability: Low Risk  (1/16/2024)    Housing Stability Vital Sign     Unable to Pay for Housing in the Last Year: No     Number of Places Lived in the Last Year: 1     Unstable Housing in the Last Year: No   Food Insecurity: No Food Insecurity (1/16/2024)    Hunger Vital Sign     Worried About Running Out of Food in the Last Year: Never true     Ran Out of Food in the Last Year: Never true   Transportation Needs: No Transportation Needs (1/16/2024)    PRAPARE - Transportation     Lack of Transportation (Medical): No     Lack of Transportation  (Non-Medical): No   Utilities: Not At Risk (1/16/2024)    [facility] Utilities     Threatened with loss of utilities: No       DISCHARGE DETAILS:    Discharge planning discussed with:: Pt cesar Barnes  Freedom of Choice: Yes  Comments - Freedom of Choice: Granddaughter reported pt preference is to return home with [facility]  CM contacted family/caregiver?: Yes  Were Treatment Team discharge recommendations reviewed with patient/caregiver?: Yes  Did patient/caregiver verbalize understanding of patient care needs?: Yes  Were patient/caregiver advised of the risks associated with not following Treatment Team discharge recommendations?: Yes    Contacts  Patient Contacts: Molly Braden  Relationship to Patient:: Family  Contact Method: Phone  Phone Number: [phone]  Reason/Outcome: Continuity of Care, Emergency Contact, Discharge Planning    Requested Home Health Care         Is the patient interested in [facility] at discharge?: Yes  Home Health Discipline requested:: Nursing, Occupational Therapy, Physical Therapy  Home Health Agency Name:: [facility] External Referral Reason (only applicable if external HHA name selected): Patient has established relationship with provider  Home Health Follow-Up Provider:: PCP  Home Health Services Needed:: Gait/ADL Training, Evaluate Functional Status and Safety, Strengthening/Theraputic Exercises to Improve Function  Homebound Criteria Met:: Requires the Assistance of Another Person for Safe Ambulation or to Leave the Home, Uses an Assist Device (i.e. cane, walker, etc)  Supporting Clincal Findings:: Fatigues Easliy in Short Distances, Limited Endurance    DME Referral Provided  Referral made for DME?: No    Other Referral/Resources/Interventions Provided:  Interventions: [facility]  Referral Comments: CM spoke with pt granddamirian Barnes via phone. CM introduced self and role with discharge planning. Molly reported pt lives with her in a ranch style home with 5 CLAUDIA. Molly  reported pt needs assistance with ADLs and functional mobility. Molly reported pt is incontinent at baseline. Molly reported pt has a RW, shower chair and hospital bed. Molly reported she is pt POA. Molly reported pt has a hx of outpatient, STR, and [facility]. Molly reported preference is for pt to return home with [facility] and declines STR if rec. MAHENDRA sent referral for [facility] via [employer]. MAHENDRA PALMER updated on same.    Would you like to participate in our [facility] service program?  : No - Declined       Discharge Destination Plan:: Home with Home Health Care  Transport at Discharge : [facility]

## 2024-01-16 NOTE — ASSESSMENT & PLAN NOTE
POA: No formal diagnosis of dementia but progressive worsening cognitive function with sundowning symptoms and agitation as per granddaughter report. Mental status at baseline per communication with granddaughter (caretaker/POA). Oriented to person, time and place but confused otherwise.  Patient was referred to geriatrics back in April but has in and out of hospital rehab thus has not made it to [facility] geriatrics office.    Plan:  Delirium precautions  Per geriatric recommendation, will switch Zoloft with Lexapro 5 mg daily.    Will also add gabapentin 100 mg twice daily, at 2 PM and 9 PM.

## 2024-01-16 NOTE — ASSESSMENT & PLAN NOTE
POA: No formal diagnosis of dementia but progressive worsening cognitive function with sundowning symptoms and agitation as per granddaughter report. Mental status at baseline per communication with granddaughter (caretaker/POA). Oriented to person, time and place but confused otherwise.  Patient was referred to geriatrics back in April but has in and out of hospital rehab thus has not made it to [facility] geriatrics office.    Plan:  Delirium precautions  Per geriatric recommendation, will switch Zoloft with Lexapro 5 mg daily.    Will also add gabapentin 100 mg twice daily, at 2 PM and 9 PM.

## 2024-01-16 NOTE — ASSESSMENT & PLAN NOTE
POA: Several months of increasing weakness and functional decline with recent RSV infection and acute worsening of weakness about 2 weeks ago. Patient's granddaughter, also has trouble swallowing pills without crushing although states that patient able to tolerate regular diet at home. Suspect multifactorial in the setting of advancing age, recent RSV infection with suspected new pneumonia, underlying undiagnosed dementia, and physical deconditioning.     Plan:  Encourage oral intake   Diet recommendations: Mechanical soft diet and thin liquids. Medication crushed in applesauce.    Aspiration and fall precautions  PT/OT eval and treat, pending rehab placement.

## 2024-01-16 NOTE — ASSESSMENT & PLAN NOTE
POA: Paroxysmal  Afib with RVR Controlled. In Sinus rhythm on ECG obtained on admission.  [id number] 4  Rate control: Lopressor 12.5 mg twice daily  Anticoagulation: Eliquis 2.5 mg twice daily    Plan:  Continue PTA Lopressor and Eliquis

## 2024-01-17 VITALS
OXYGEN SATURATION: 90 % | HEIGHT: 62 IN | RESPIRATION RATE: 18 BRPM | SYSTOLIC BLOOD PRESSURE: 150 MMHG | WEIGHT: 155.42 LBS | HEART RATE: 85 BPM | DIASTOLIC BLOOD PRESSURE: 80 MMHG | TEMPERATURE: 98.4 F | BODY MASS INDEX: 28.6 KG/M2

## 2024-01-17 PROBLEM — E87.6 HYPOKALEMIA: Status: RESOLVED | Noted: 2024-01-13 | Resolved: 2024-01-17

## 2024-01-17 LAB
ANION GAP SERPL CALCULATED.3IONS-SCNC: 6 MMOL/L
BUN SERPL-MCNC: 9 MG/DL (ref 5–25)
CALCIUM SERPL-MCNC: 8.2 MG/DL (ref 8.4–10.2)
CHLORIDE SERPL-SCNC: 108 MMOL/L (ref 96–108)
CO2 SERPL-SCNC: 24 MMOL/L (ref 21–32)
CREAT SERPL-MCNC: 0.72 MG/DL (ref 0.6–1.3)
ERYTHROCYTE [DISTWIDTH] IN BLOOD BY AUTOMATED COUNT: 15.6 % (ref 11.6–15.1)
GFR SERPL CREATININE-BSD FRML MDRD: 69 ML/MIN/1.73SQ M
GLUCOSE SERPL-MCNC: 86 MG/DL (ref 65–140)
HCT VFR BLD AUTO: 29.4 % (ref 34.8–46.1)
HGB BLD-MCNC: 8.8 G/DL (ref 11.5–15.4)
MCH RBC QN AUTO: 29.6 PG (ref 26.8–34.3)
MCHC RBC AUTO-ENTMCNC: 29.9 G/DL (ref 31.4–37.4)
MCV RBC AUTO: 99 FL (ref 82–98)
PLATELET # BLD AUTO: 366 THOUSANDS/UL (ref 149–390)
PMV BLD AUTO: 9.9 FL (ref 8.9–12.7)
POTASSIUM SERPL-SCNC: 4.5 MMOL/L (ref 3.5–5.3)
RBC # BLD AUTO: 2.97 MILLION/UL (ref 3.81–5.12)
SODIUM SERPL-SCNC: 138 MMOL/L (ref 135–147)
WBC # BLD AUTO: 11.51 THOUSAND/UL (ref 4.31–10.16)

## 2024-01-17 PROCEDURE — 99239 HOSP IP/OBS DSCHRG MGMT >30: CPT | Performed by: INTERNAL MEDICINE

## 2024-01-17 PROCEDURE — 80048 BASIC METABOLIC PNL TOTAL CA: CPT

## 2024-01-17 PROCEDURE — 85027 COMPLETE CBC AUTOMATED: CPT

## 2024-01-17 RX ORDER — CEFDINIR 300 MG/1
300 CAPSULE ORAL EVERY 12 HOURS SCHEDULED
Qty: 4 CAPSULE | Refills: 0 | Status: SHIPPED | OUTPATIENT
Start: 2024-01-18 | End: 2024-01-20

## 2024-01-17 RX ORDER — GABAPENTIN 100 MG/1
100 CAPSULE ORAL 2 TIMES DAILY
Qty: 60 CAPSULE | Refills: 0 | Status: SHIPPED | OUTPATIENT
Start: 2024-01-17 | End: 2024-02-16

## 2024-01-17 RX ORDER — ESCITALOPRAM OXALATE 5 MG/1
5 TABLET ORAL DAILY
Qty: 30 TABLET | Refills: 0 | Status: SHIPPED | OUTPATIENT
Start: 2024-01-17 | End: 2024-02-16

## 2024-01-17 RX ADMIN — PANTOPRAZOLE SODIUM 40 MG: 40 TABLET, DELAYED RELEASE ORAL at 08:42

## 2024-01-17 RX ADMIN — GUAIFENESIN AND DEXTROMETHORPHAN 10 ML: 100; 10 SYRUP ORAL at 07:04

## 2024-01-17 RX ADMIN — PRAVASTATIN SODIUM 20 MG: 20 TABLET ORAL at 08:42

## 2024-01-17 RX ADMIN — CYANOCOBALAMIN TAB 500 MCG 1000 MCG: 500 TAB at 08:42

## 2024-01-17 RX ADMIN — METOPROLOL TARTRATE 12.5 MG: 25 TABLET, FILM COATED ORAL at 08:42

## 2024-01-17 RX ADMIN — GABAPENTIN 100 MG: 100 CAPSULE ORAL at 08:42

## 2024-01-17 RX ADMIN — APIXABAN 2.5 MG: 2.5 TABLET, FILM COATED ORAL at 08:42

## 2024-01-17 RX ADMIN — CEFTRIAXONE SODIUM 1000 MG: 10 INJECTION, POWDER, FOR SOLUTION INTRAVENOUS at 08:42

## 2024-01-17 RX ADMIN — ESCITALOPRAM OXALATE 5 MG: 10 TABLET ORAL at 08:42

## 2024-01-17 RX ADMIN — BENZONATATE 100 MG: 100 CAPSULE ORAL at 08:42

## 2024-01-17 NOTE — CASE MANAGEMENT
Case Management Progress Note    Patient name Grace Clayton  Location S /S -01 MRN [id number]  : 1925 Date 2024       LOS (days): 4  Geometric Mean LOS (GMLOS) (days): 3.6  Days to GMLOS:-0.7        OBJECTIVE:        Current admission status: Inpatient  Preferred [facility]acy:   [id number] [address] [address]  Phone: [phone] Fax: [phone]    Primary Care Provider: No primary care provider on file.    Primary Insurance: MEDICARE  Secondary Insurance: UNITED AMERICAN INSURANCE    PROGRESS NOTE:    [facility] resident notified this CM via TT of granddaughter request for [facility] for patient - referral sent to [facility] via aidin - agency confirmed for f/u upon pt d/c to home.

## 2024-01-17 NOTE — DISCHARGE INSTR - AVS FIRST PAGE
Dear Grace Clayton,     It was our pleasure to care for you here at [facility].  It is our hope that we were always able to exceed the expected standards for your care during your stay.  You were hospitalized due to generalized weakness, pneumonia.  You were cared for on the third floor by Wade De La Garza DO under the service of Royce Bethea* with the [facility] who covers for your primary care physician (PCP), No primary care provider on file., while you were hospitalized.  If you have any questions or concerns related to this hospitalization, you may contact us at .  For follow up as well as any medication refills, we recommend that you follow up with your primary care physician.  A registered nurse will reach out to you by phone within a few days after your discharge to answer any additional questions that you may have after going home.  However, at this time we provide for you here, the most important instructions / recommendations at discharge:     Notable Medication Adjustments -   Please start taking cefdinir 300 mg, twice daily for 2 days.  Please start taking Lexapro 5 mg, daily.  Please start taking gabapentin 100 mg, at 2 PM and 9 PM.  Please stop taking Zoloft.  Testing Required after Discharge -   CBC in 1 week to re-check your hemoglobin   ** Please contact your PCP to request testing orders for any of the testing recommended here **  Important follow up information -   Please follow-up with your PCP in 1 week.  Ambulatory referral to palliative care with [facility] has confirmed with case management, please follow-up with them for further assistance.  Other Instructions -   If you have any fever, chills, chest pain, shortness of breath, nausea, vomiting, abdominal pain, coughing up blood, black/tarry/bloody stools please contact your PCP or return to the ED for further evaluation.  Please review this entire after visit  summary as additional general instructions including medication list, appointments, activity, diet, any pertinent wound care, and other additional recommendations from your care team that may be provided for you.      Sincerely,     Wade De La Garza, DO

## 2024-01-17 NOTE — ASSESSMENT & PLAN NOTE
POA: Tested positive for RSV on 1/3/2023 due to sick contact at home with worsening weakness and increasing cough productive of green sputum.   Afebrile with elevated WBC 15.6 but normal procalcitonin on admission  Pro-Anastacio trending down from 0.38 -0.20.   CTA chest showed no PE but possible new RLL patchy opacity concerning for possible pneumonia.    Legionella, strep pneumo cultures negative. Blood cultures negative at 48 hours. Repeat viral swab negative. Urine cultures grew E. coli, patient being treated with ceftriaxone which provides coverage.  Suspect RSV with possible viral vs bacterial vs aspiration pneumonia.     Plan:  Monitor temperature   Continue with ceftriaxone (day 5). Will transiton to oral cefdinir upon discharge (total 7 days).

## 2024-01-17 NOTE — ASSESSMENT & PLAN NOTE
POA: Several months of increasing weakness and functional decline with recent RSV infection and acute worsening of weakness about 2 weeks ago. Patient's granddaughter, also has trouble swallowing pills without crushing although states that patient able to tolerate regular diet at home. Suspect multifactorial in the setting of advancing age, recent RSV infection with suspected new pneumonia, underlying undiagnosed dementia, and physical deconditioning.     Plan:  Encourage oral intake   Diet recommendations: Mechanical soft diet and thin liquids. Medication crushed in applesauce.

## 2024-01-17 NOTE — ASSESSMENT & PLAN NOTE
Recent Labs     01/15/24  0604 01/16/24  0403 01/17/24  0715   HGB 8.1* 9.3* 8.8*       Baseline Hgb: 9-10, however most recently in December was 13.   Etiology unclear but hemoglobin trending down during this hospitalization, suspect dilutional as patient has received fluids. Talked to family, okay with monitoring for now instead of doing EGD or colonoscopy. They have consented verbally to transfusion if hemoglobin drops less than 7.  TSH, folate within normal range.  B12 high, patient is currently on B12 supplementation.  Iron panel consistent with anemia of chronic disease.    Plan:  Advised to follow-up with PCP in 1 week and repeat CBC to check hemoglobin levels.

## 2024-01-17 NOTE — ASSESSMENT & PLAN NOTE
POA: No formal diagnosis of dementia but progressive worsening cognitive function with sundowning symptoms and agitation as per granddaughter report. Mental status at baseline per communication with granddaughter (caretaker/POA). Oriented to person, time and place but confused otherwise.  Patient was referred to geriatrics back in April but has in and out of hospital rehab thus has not made it to [facility] geriatrics office.    Plan:  Delirium precautions  Per geriatric recommendation, will switch Zoloft with Lexapro 5 mg daily.    Will also add gabapentin 100 mg twice daily, at 2 PM and 9 PM.

## 2024-01-17 NOTE — DISCHARGE SUMMARY
Critical access hospital  Discharge- Grace Clayton 1/26/1925, 89 y.o. female MRN: [id number]  Unit/Bed#: S -01 Encounter: [id number]  Primary Care Provider: No primary care provider on file.   Date and time admitted to hospital: 1/12/2024 10:47 PM    * Generalized weakness  Assessment & Plan  POA: Several months of increasing weakness and functional decline with recent RSV infection and acute worsening of weakness about 2 weeks ago. Patient's granddaughter, also has trouble swallowing pills without crushing although states that patient able to tolerate regular diet at home. Suspect multifactorial in the setting of advancing age, recent RSV infection with suspected new pneumonia, underlying undiagnosed dementia, and physical deconditioning.     Plan:  Encourage oral intake   Diet recommendations: Mechanical soft diet and thin liquids. Medication crushed in applesauce.          Anemia  Assessment & Plan  Recent Labs     01/15/24  0604 01/16/24  0403 01/17/24  0715   HGB 8.1* 9.3* 8.8*       Baseline Hgb: 9-10, however most recently in December was 13.   Etiology unclear but hemoglobin trending down during this hospitalization, suspect dilutional as patient has received fluids. Talked to family, okay with monitoring for now instead of doing EGD or colonoscopy. They have consented verbally to transfusion if hemoglobin drops less than 7.  TSH, folate within normal range.  B12 high, patient is currently on B12 supplementation.  Iron panel consistent with anemia of chronic disease.    Plan:  Advised to follow-up with PCP in 1 week and repeat CBC to check hemoglobin levels.    RSV infection with wrosening cough  Assessment & Plan  POA: Tested positive for RSV on 1/3/2023 due to sick contact at home with worsening weakness and increasing cough productive of green sputum.   Afebrile with elevated WBC 15.6 but normal procalcitonin on admission  Pro-Anastacio trending down from 0.38 -0.20.   CTA chest showed no PE but  possible new RLL patchy opacity concerning for possible pneumonia.    Legionella, strep pneumo cultures negative. Blood cultures negative at 48 hours. Repeat viral swab negative. Urine cultures grew E. coli, patient being treated with ceftriaxone which provides coverage.  Suspect RSV with possible viral vs bacterial vs aspiration pneumonia.     Plan:  Monitor temperature   Continue with ceftriaxone (day 5). Will transiton to oral cefdinir upon discharge (total 7 days).     Non-MI troponin elevation  Assessment & Plan  POA: Mildly elevated trops from 50s -> 80s most likely nonMI troponin elevation in the setting of active infection. No ischemic changes on ECG noted and patient denies any chest pain or SOB although sure how reliable given possible underlying dementia.     Plan:  If symptomatic, obtain EKG and troponins.    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 Mental status at baseline per communication with granddaughter (caretaker/POA). Oriented to person, time and place but confused otherwise.  Patient was referred to geriatrics back in April but has in and out of hospital rehab thus has not made it to [facility] geriatrics office.    Plan:  Delirium precautions  Per geriatric recommendation, will switch Zoloft with Lexapro 5 mg daily.    Will also add gabapentin 100 mg twice daily, at 2 PM and 9 PM.      Paroxysmal atrial fibrillation (HCC)  Assessment & Plan  POA: Paroxysmal  Afib with RVR Controlled. In Sinus rhythm on ECG obtained on admission.  JOANNA?DS?-VASc 4  Rate control: Lopressor 12.5 mg twice daily  Anticoagulation: Eliquis 2.5 mg twice daily    Plan:  Continue PTA Lopressor and Eliquis    Essential hypertension  Assessment & Plan  POA: Only takes Lopressor at home for both PAF and hypertension. Labile BP on  admission.    Plan:  Monitor vitals  Continue home Lopressor    Hypokalemia-resolved as of 1/17/2024  Assessment & Plan  Recent Labs     01/15/24  0604 01/16/24  0403 01/17/24  0537   K 3.7 3.2* 4.5       Stable.        Medical Problems       Resolved Problems  Date Reviewed: 1/17/2024            Resolved    Hypokalemia 1/17/2024     Resolved by  Wade De La Garza DO        Discharging Resident: Wade De La Garza DO  Discharging Attending: No att. providers found  PCP: No primary care provider on file.  Admission Date:   Admission Orders (From admission, onward)       Ordered        01/13/24 0443  INPATIENT ADMISSION  Once                          Discharge Date: 01/17/24    Consultations During Hospital Stay:  Geriatrics     Procedures Performed:   None    Significant Findings / Test Results:   XR shoulder 2+ vw left   Final Result by Nacho Cifuentes MD (01/15 0905)      No acute osseous abnormality. Severe glenohumeral and mild acromioclavicular joint osteoarthrosis.      Workstation performed: [id number]         PE Study with CT Abdomen and Pelvis with contrast   Final Result by Chau Tang MD (01/13 0402)      1.  No evidence of pulmonary embolus.   2.  Mild patchy opacity in the right lower lobe compatible with pneumonia in the appropriate clinical setting.   3.  Left thyroid nodules measure up to 1.6 cm, recommendations as above.         Workstation performed: [id number]         XR chest 2 views   ED Interpretation by Emory Barrett MD (01/13 0026)   No evidence of acute cardiopulmonary pathology      Final Result by Jason Ramirez MD (01/14 1813)      No acute cardiopulmonary disease.                  Workstation performed: [id number]             Incidental Findings:    1.6 cm left thyroid nodule. 1 cm left thyroid nodule (series 301, image 10). Incidental discovery of one or more thyroid nodule(s) measuring more than 1.5 cm and without suspicious features is noted in this patient who is above 35 years old.  According to guidelines published in the February 2015 white paper on incidental thyroid nodules in the Journal of the American College of Radiology (JACR), further characterization with thyroid ultrasound may be considered.  However, as the majority of incidental thyroid nodules are benign and because small incidental thyroid malignancies typically demonstrate indolent behavior, consideration of the patient's life expectancy and possible comorbidities should be taken into account prior to a decision to pursue further imaging. Hypoplastic or absent right thyroid gland.  I reviewed the above mentioned incidental findings with the patient and/or family and they expressed understanding.    Test Results Pending at Discharge (will require follow up):  None      Outpatient Tests Requested:  CBC in 1 week.     Complications:  None    Reason for Admission: Generalized weakness secondary to RSV infection.    Hospital Course:   Grace Clayton is a 89 y.o. female patient with history of hypertension, paroxysmal A-fib on Eliquis who originally presented to the hospital on 1/12/2024 due to cough, shortness of breath, generalized weakness, subjective fever and chills thought to be secondary to RSV infection that was positive on 1/3/2024. Patient had leukocytosis with normal Pro-Anastacio on admission, however Pro-Anastacio did trend up but was eventually within normal range during the course of admission. Viral panel was negative.  Electrolytes were replenished.  Patient had mild troponin elevation, in the setting of active infection.  CT imaging in the ED showed possible pneumonia, patient was started on IV ceftriaxone. Patient was seen by geriatrics, recommended switching her Zoloft to Lexapro 5 mg and adding gabapentin to help with sleep and anxiety. Patient was stable for discharge, was given prescription for cefdinir for the 2 days.  PT/OT recommended patient to STR but patient declined.  She instead preferred [facility], was stable for  "discharge.    Please see above list of diagnoses and related plan for additional information.     Condition at Discharge: fair    Discharge Day Visit / Exam:   Subjective: No significant overnight events. She reports her cough is better. She wants to go home today.  She denies any fever, chills, chest pain, shortness of breath, nausea, vomiting, abdominal pain.    Vitals: Blood Pressure: 150/80 (01/17/24 0723)  Pulse: 85 (01/16/24 2012)  Temperature: 98.4 °F (36.9 °C) (01/17/24 0723)  Temp Source: Oral (01/16/24 2012)  Respirations: 18 (01/17/24 0723)  Height: 5' 2\" (157.5 cm) (from prior encounters) (01/15/24 1116)  Weight - Scale: 70.5 kg (155 lb 6.8 oz) (01/17/24 0532)  SpO2: 90 % (01/16/24 2012)  Exam:   Physical Exam  Constitutional:       Appearance: Normal appearance.      Comments: Frail, cachectic appearing.   HENT:      Head: Normocephalic and atraumatic.      Mouth/Throat:      Mouth: Mucous membranes are moist.      Pharynx: Oropharynx is clear.   Eyes:      Extraocular Movements: Extraocular movements intact.   Cardiovascular:      Rate and Rhythm: Normal rate and regular rhythm.   Pulmonary:      Effort: Pulmonary effort is normal.      Comments: Decreased, coarse breath sounds.  Abdominal:      General: Abdomen is flat.      Palpations: Abdomen is soft.   Skin:     General: Skin is warm and dry.      Capillary Refill: Capillary refill takes less than 2 seconds.   Neurological:      Mental Status: Mental status is at baseline.          Discussion with Family: Updated  (granddaughter) via phone.    Discharge instructions/Information to patient and family:   See after visit summary for information provided to patient and family.      Provisions for Follow-Up Care:  See after visit summary for information related to follow-up care and any pertinent home health orders.      Mobility at time of Discharge:   Basic Mobility Inpatient Raw Score: 7  -HLM Goal: 2: Bed activities/Dependent " transfer  JH-HLM Achieved: 2: Bed activities/Dependent transfer  HLM Goal achieved. Continue to encourage appropriate mobility.     Disposition:   Home with VNA Services (Reminder: Complete face to face encounter)    Planned Readmission: No    Discharge Medications:  See after visit summary for reconciled discharge medications provided to patient and/or family.      **Please Note: This note may have been constructed using a voice recognition system**

## 2024-01-17 NOTE — ASSESSMENT & PLAN NOTE
POA: Paroxysmal  Afib with RVR Controlled. In Sinus rhythm on ECG obtained on admission.  [id number] 4  Rate control: Lopressor 12.5 mg twice daily  Anticoagulation: Eliquis 2.5 mg twice daily    Plan:  Continue PTA Lopressor and Eliquis

## 2024-01-18 LAB
BACTERIA BLD CULT: NORMAL
BACTERIA BLD CULT: NORMAL

## 2024-02-19 ENCOUNTER — HOSPITAL ENCOUNTER (INPATIENT)
Facility: HOSPITAL | Age: 89
LOS: 2 days | Discharge: HOME HEALTH | DRG: 202 | End: 2024-02-21
Attending: EMERGENCY MEDICINE | Admitting: HOSPITALIST
Payer: MEDICARE

## 2024-02-19 ENCOUNTER — APPOINTMENT (EMERGENCY)
Dept: CT IMAGING | Facility: HOSPITAL | Age: 89
DRG: 202 | End: 2024-02-19
Payer: MEDICARE

## 2024-02-19 ENCOUNTER — APPOINTMENT (EMERGENCY)
Dept: RADIOLOGY | Facility: HOSPITAL | Age: 89
DRG: 202 | End: 2024-02-19
Payer: MEDICARE

## 2024-02-19 DIAGNOSIS — K21.9 GERD (GASTROESOPHAGEAL REFLUX DISEASE): ICD-10-CM

## 2024-02-19 DIAGNOSIS — R05.3 CHRONIC COUGH: ICD-10-CM

## 2024-02-19 DIAGNOSIS — J90 PLEURAL EFFUSION, BILATERAL: ICD-10-CM

## 2024-02-19 DIAGNOSIS — R05.9 COUGH: ICD-10-CM

## 2024-02-19 DIAGNOSIS — R06.00 DYSPNEA: Primary | ICD-10-CM

## 2024-02-19 LAB
2HR DELTA HS TROPONIN: -3 NG/L
ALBUMIN SERPL BCP-MCNC: 3 G/DL (ref 3.5–5)
ALP SERPL-CCNC: 58 U/L (ref 34–104)
ALT SERPL W P-5'-P-CCNC: 21 U/L (ref 7–52)
ANION GAP SERPL CALCULATED.3IONS-SCNC: 6 MMOL/L
AST SERPL W P-5'-P-CCNC: 20 U/L (ref 13–39)
BACTERIA UR QL AUTO: ABNORMAL /HPF
BASOPHILS # BLD AUTO: 0.03 THOUSANDS/ÂΜL (ref 0–0.1)
BASOPHILS NFR BLD AUTO: 0 % (ref 0–1)
BILIRUB SERPL-MCNC: 0.61 MG/DL (ref 0.2–1)
BILIRUB UR QL STRIP: NEGATIVE
BNP SERPL-MCNC: 512 PG/ML (ref 0–100)
BUN SERPL-MCNC: 17 MG/DL (ref 5–25)
CALCIUM ALBUM COR SERPL-MCNC: 9.1 MG/DL (ref 8.3–10.1)
CALCIUM SERPL-MCNC: 8.3 MG/DL (ref 8.4–10.2)
CARDIAC TROPONIN I PNL SERPL HS: 40 NG/L
CARDIAC TROPONIN I PNL SERPL HS: 43 NG/L
CHLORIDE SERPL-SCNC: 104 MMOL/L (ref 96–108)
CLARITY UR: CLEAR
CO2 SERPL-SCNC: 30 MMOL/L (ref 21–32)
COLOR UR: ABNORMAL
CREAT SERPL-MCNC: 1.04 MG/DL (ref 0.6–1.3)
EOSINOPHIL # BLD AUTO: 0.33 THOUSAND/ÂΜL (ref 0–0.61)
EOSINOPHIL NFR BLD AUTO: 4 % (ref 0–6)
ERYTHROCYTE [DISTWIDTH] IN BLOOD BY AUTOMATED COUNT: 16.8 % (ref 11.6–15.1)
FLUAV RNA RESP QL NAA+PROBE: NEGATIVE
FLUBV RNA RESP QL NAA+PROBE: NEGATIVE
GFR SERPL CREATININE-BSD FRML MDRD: 44 ML/MIN/1.73SQ M
GLUCOSE SERPL-MCNC: 151 MG/DL (ref 65–140)
GLUCOSE UR STRIP-MCNC: NEGATIVE MG/DL
HCT VFR BLD AUTO: 27.1 % (ref 34.8–46.1)
HGB BLD-MCNC: 8.3 G/DL (ref 11.5–15.4)
HGB UR QL STRIP.AUTO: ABNORMAL
IMM GRANULOCYTES # BLD AUTO: 0.05 THOUSAND/UL (ref 0–0.2)
IMM GRANULOCYTES NFR BLD AUTO: 1 % (ref 0–2)
KETONES UR STRIP-MCNC: NEGATIVE MG/DL
LEUKOCYTE ESTERASE UR QL STRIP: NEGATIVE
LYMPHOCYTES # BLD AUTO: 0.81 THOUSANDS/ÂΜL (ref 0.6–4.47)
LYMPHOCYTES NFR BLD AUTO: 9 % (ref 14–44)
MCH RBC QN AUTO: 31.1 PG (ref 26.8–34.3)
MCHC RBC AUTO-ENTMCNC: 30.6 G/DL (ref 31.4–37.4)
MCV RBC AUTO: 102 FL (ref 82–98)
MONOCYTES # BLD AUTO: 0.87 THOUSAND/ÂΜL (ref 0.17–1.22)
MONOCYTES NFR BLD AUTO: 9 % (ref 4–12)
NEUTROPHILS # BLD AUTO: 7.27 THOUSANDS/ÂΜL (ref 1.85–7.62)
NEUTS SEG NFR BLD AUTO: 77 % (ref 43–75)
NITRITE UR QL STRIP: NEGATIVE
NON-SQ EPI CELLS URNS QL MICRO: ABNORMAL /HPF
NRBC BLD AUTO-RTO: 0 /100 WBCS
PH UR STRIP.AUTO: 6.5 [PH]
PLATELET # BLD AUTO: 182 THOUSANDS/UL (ref 149–390)
PMV BLD AUTO: 10.8 FL (ref 8.9–12.7)
POTASSIUM SERPL-SCNC: 3.5 MMOL/L (ref 3.5–5.3)
PROCALCITONIN SERPL-MCNC: 0.05 NG/ML
PROT SERPL-MCNC: 6.1 G/DL (ref 6.4–8.4)
PROT UR STRIP-MCNC: ABNORMAL MG/DL
RBC # BLD AUTO: 2.67 MILLION/UL (ref 3.81–5.12)
RBC #/AREA URNS AUTO: ABNORMAL /HPF
RSV RNA RESP QL NAA+PROBE: NEGATIVE
SARS-COV-2 RNA RESP QL NAA+PROBE: NEGATIVE
SODIUM SERPL-SCNC: 140 MMOL/L (ref 135–147)
SP GR UR STRIP.AUTO: 1.01 (ref 1–1.03)
UROBILINOGEN UR STRIP-ACNC: <2 MG/DL
WBC # BLD AUTO: 9.36 THOUSAND/UL (ref 4.31–10.16)
WBC #/AREA URNS AUTO: ABNORMAL /HPF

## 2024-02-19 PROCEDURE — 0241U HB NFCT DS VIR RESP RNA 4 TRGT: CPT | Performed by: EMERGENCY MEDICINE

## 2024-02-19 PROCEDURE — 71045 X-RAY EXAM CHEST 1 VIEW: CPT

## 2024-02-19 PROCEDURE — 81001 URINALYSIS AUTO W/SCOPE: CPT | Performed by: EMERGENCY MEDICINE

## 2024-02-19 PROCEDURE — 84484 ASSAY OF TROPONIN QUANT: CPT | Performed by: EMERGENCY MEDICINE

## 2024-02-19 PROCEDURE — 99285 EMERGENCY DEPT VISIT HI MDM: CPT | Performed by: EMERGENCY MEDICINE

## 2024-02-19 PROCEDURE — G1004 CDSM NDSC: HCPCS

## 2024-02-19 PROCEDURE — 71250 CT THORAX DX C-: CPT

## 2024-02-19 PROCEDURE — 93005 ELECTROCARDIOGRAM TRACING: CPT

## 2024-02-19 PROCEDURE — 80053 COMPREHEN METABOLIC PANEL: CPT | Performed by: EMERGENCY MEDICINE

## 2024-02-19 PROCEDURE — 83880 ASSAY OF NATRIURETIC PEPTIDE: CPT | Performed by: EMERGENCY MEDICINE

## 2024-02-19 PROCEDURE — 36415 COLL VENOUS BLD VENIPUNCTURE: CPT | Performed by: EMERGENCY MEDICINE

## 2024-02-19 PROCEDURE — 99284 EMERGENCY DEPT VISIT MOD MDM: CPT

## 2024-02-19 PROCEDURE — 85025 COMPLETE CBC W/AUTO DIFF WBC: CPT | Performed by: EMERGENCY MEDICINE

## 2024-02-19 PROCEDURE — 84145 PROCALCITONIN (PCT): CPT | Performed by: EMERGENCY MEDICINE

## 2024-02-19 RX ORDER — FUROSEMIDE 10 MG/ML
20 INJECTION INTRAMUSCULAR; INTRAVENOUS ONCE
Status: COMPLETED | OUTPATIENT
Start: 2024-02-19 | End: 2024-02-19

## 2024-02-19 RX ADMIN — FUROSEMIDE 20 MG: 10 INJECTION, SOLUTION INTRAMUSCULAR; INTRAVENOUS at 23:07

## 2024-02-19 NOTE — Clinical Note
Case was discussed with   and the patient's admission status was agreed to be Admission Status: inpatient status to the service of . __ .

## 2024-02-20 ENCOUNTER — APPOINTMENT (INPATIENT)
Dept: NON INVASIVE DIAGNOSTICS | Facility: HOSPITAL | Age: 89
DRG: 202 | End: 2024-02-20
Payer: MEDICARE

## 2024-02-20 PROBLEM — R05.3 CHRONIC COUGH: Status: ACTIVE | Noted: 2024-02-20

## 2024-02-20 LAB
ANION GAP SERPL CALCULATED.3IONS-SCNC: 6 MMOL/L
AORTIC ROOT: 3.5 CM
APICAL FOUR CHAMBER EJECTION FRACTION: 64 %
ATRIAL RATE: 60 BPM
BASOPHILS # BLD AUTO: 0.05 THOUSANDS/ÂΜL (ref 0–0.1)
BASOPHILS NFR BLD AUTO: 0 % (ref 0–1)
BSA FOR ECHO PROCEDURE: 1.6 M2
BUN SERPL-MCNC: 16 MG/DL (ref 5–25)
CALCIUM SERPL-MCNC: 8.5 MG/DL (ref 8.4–10.2)
CHLORIDE SERPL-SCNC: 102 MMOL/L (ref 96–108)
CO2 SERPL-SCNC: 31 MMOL/L (ref 21–32)
CREAT SERPL-MCNC: 1 MG/DL (ref 0.6–1.3)
EOSINOPHIL # BLD AUTO: 0.29 THOUSAND/ÂΜL (ref 0–0.61)
EOSINOPHIL NFR BLD AUTO: 2 % (ref 0–6)
ERYTHROCYTE [DISTWIDTH] IN BLOOD BY AUTOMATED COUNT: 16.5 % (ref 11.6–15.1)
FRACTIONAL SHORTENING: 29 (ref 28–44)
GFR SERPL CREATININE-BSD FRML MDRD: 46 ML/MIN/1.73SQ M
GLUCOSE SERPL-MCNC: 115 MG/DL (ref 65–140)
HCT VFR BLD AUTO: 31.1 % (ref 34.8–46.1)
HGB BLD-MCNC: 9.8 G/DL (ref 11.5–15.4)
IMM GRANULOCYTES # BLD AUTO: 0.06 THOUSAND/UL (ref 0–0.2)
IMM GRANULOCYTES NFR BLD AUTO: 1 % (ref 0–2)
INTERVENTRICULAR SEPTUM IN DIASTOLE (PARASTERNAL SHORT AXIS VIEW): 1.2 CM
INTERVENTRICULAR SEPTUM: 1.2 CM (ref 0.6–1.1)
LEFT ATRIUM SIZE: 2.9 CM
LEFT INTERNAL DIMENSION IN SYSTOLE: 2.5 CM (ref 2.1–4)
LEFT VENTRICULAR INTERNAL DIMENSION IN DIASTOLE: 3.5 CM (ref 3.5–6)
LEFT VENTRICULAR POSTERIOR WALL IN END DIASTOLE: 1.1 CM
LEFT VENTRICULAR STROKE VOLUME: 30 ML
LVSV (TEICH): 30 ML
LYMPHOCYTES # BLD AUTO: 1.12 THOUSANDS/ÂΜL (ref 0.6–4.47)
LYMPHOCYTES NFR BLD AUTO: 9 % (ref 14–44)
MCH RBC QN AUTO: 31.1 PG (ref 26.8–34.3)
MCHC RBC AUTO-ENTMCNC: 31.5 G/DL (ref 31.4–37.4)
MCV RBC AUTO: 99 FL (ref 82–98)
MONOCYTES # BLD AUTO: 1.16 THOUSAND/ÂΜL (ref 0.17–1.22)
MONOCYTES NFR BLD AUTO: 9 % (ref 4–12)
MV E'TISSUE VEL-LAT: 8 CM/S
MV E'TISSUE VEL-SEP: 7 CM/S
NEUTROPHILS # BLD AUTO: 9.83 THOUSANDS/ÂΜL (ref 1.85–7.62)
NEUTS SEG NFR BLD AUTO: 79 % (ref 43–75)
NRBC BLD AUTO-RTO: 0 /100 WBCS
P AXIS: 82 DEGREES
PA SYSTOLIC PRESSURE: 60 MMHG
PLATELET # BLD AUTO: 209 THOUSANDS/UL (ref 149–390)
PMV BLD AUTO: 10.7 FL (ref 8.9–12.7)
POTASSIUM SERPL-SCNC: 3.3 MMOL/L (ref 3.5–5.3)
PR INTERVAL: 252 MS
QRS AXIS: 12 DEGREES
QRSD INTERVAL: 68 MS
QT INTERVAL: 430 MS
QTC INTERVAL: 430 MS
RBC # BLD AUTO: 3.15 MILLION/UL (ref 3.81–5.12)
SL CV LV EF: 70
SL CV PED ECHO LEFT VENTRICLE DIASTOLIC VOLUME (MOD BIPLANE) 2D: 52 ML
SL CV PED ECHO LEFT VENTRICLE SYSTOLIC VOLUME (MOD BIPLANE) 2D: 22 ML
SODIUM SERPL-SCNC: 139 MMOL/L (ref 135–147)
T WAVE AXIS: 48 DEGREES
TR MAX PG: 69 MMHG
TR PEAK VELOCITY: 4.2 M/S
TRICUSPID ANNULAR PLANE SYSTOLIC EXCURSION: 1.8 CM
TRICUSPID VALVE PEAK REGURGITATION VELOCITY: 4.16 M/S
VENTRICULAR RATE: 60 BPM
WBC # BLD AUTO: 12.51 THOUSAND/UL (ref 4.31–10.16)

## 2024-02-20 PROCEDURE — 93321 DOPPLER ECHO F-UP/LMTD STD: CPT

## 2024-02-20 PROCEDURE — 93308 TTE F-UP OR LMTD: CPT | Performed by: INTERNAL MEDICINE

## 2024-02-20 PROCEDURE — 92610 EVALUATE SWALLOWING FUNCTION: CPT

## 2024-02-20 PROCEDURE — 93325 DOPPLER ECHO COLOR FLOW MAPG: CPT

## 2024-02-20 PROCEDURE — 85025 COMPLETE CBC W/AUTO DIFF WBC: CPT

## 2024-02-20 PROCEDURE — 93321 DOPPLER ECHO F-UP/LMTD STD: CPT | Performed by: INTERNAL MEDICINE

## 2024-02-20 PROCEDURE — 93325 DOPPLER ECHO COLOR FLOW MAPG: CPT | Performed by: INTERNAL MEDICINE

## 2024-02-20 PROCEDURE — 93010 ELECTROCARDIOGRAM REPORT: CPT | Performed by: INTERNAL MEDICINE

## 2024-02-20 PROCEDURE — 80048 BASIC METABOLIC PNL TOTAL CA: CPT

## 2024-02-20 PROCEDURE — 93308 TTE F-UP OR LMTD: CPT

## 2024-02-20 PROCEDURE — 99223 1ST HOSP IP/OBS HIGH 75: CPT | Performed by: HOSPITALIST

## 2024-02-20 RX ORDER — POTASSIUM CHLORIDE 20 MEQ/1
40 TABLET, EXTENDED RELEASE ORAL ONCE
Status: COMPLETED | OUTPATIENT
Start: 2024-02-20 | End: 2024-02-20

## 2024-02-20 RX ORDER — FAMOTIDINE 20 MG/1
10 TABLET, FILM COATED ORAL DAILY
Status: DISCONTINUED | OUTPATIENT
Start: 2024-02-21 | End: 2024-02-21 | Stop reason: HOSPADM

## 2024-02-20 RX ORDER — ESCITALOPRAM OXALATE 10 MG/1
5 TABLET ORAL DAILY
Status: DISCONTINUED | OUTPATIENT
Start: 2024-02-20 | End: 2024-02-21 | Stop reason: HOSPADM

## 2024-02-20 RX ORDER — LANOLIN ALCOHOL/MO/W.PET/CERES
3 CREAM (GRAM) TOPICAL
Status: DISCONTINUED | OUTPATIENT
Start: 2024-02-20 | End: 2024-02-21 | Stop reason: HOSPADM

## 2024-02-20 RX ORDER — GABAPENTIN 100 MG/1
100 CAPSULE ORAL 2 TIMES DAILY
Status: DISCONTINUED | OUTPATIENT
Start: 2024-02-20 | End: 2024-02-21 | Stop reason: HOSPADM

## 2024-02-20 RX ORDER — FAMOTIDINE 20 MG/1
20 TABLET, FILM COATED ORAL 2 TIMES DAILY
Status: DISCONTINUED | OUTPATIENT
Start: 2024-02-20 | End: 2024-02-20

## 2024-02-20 RX ORDER — BENZONATATE 100 MG/1
200 CAPSULE ORAL 3 TIMES DAILY PRN
Status: DISCONTINUED | OUTPATIENT
Start: 2024-02-19 | End: 2024-02-20

## 2024-02-20 RX ORDER — PRAVASTATIN SODIUM 20 MG
20 TABLET ORAL DAILY
Status: DISCONTINUED | OUTPATIENT
Start: 2024-02-20 | End: 2024-02-21 | Stop reason: HOSPADM

## 2024-02-20 RX ORDER — PANTOPRAZOLE SODIUM 40 MG/1
40 TABLET, DELAYED RELEASE ORAL EVERY MORNING
Status: DISCONTINUED | OUTPATIENT
Start: 2024-02-20 | End: 2024-02-21 | Stop reason: HOSPADM

## 2024-02-20 RX ORDER — GUAIFENESIN 600 MG/1
600 TABLET, EXTENDED RELEASE ORAL EVERY 12 HOURS SCHEDULED
Status: DISCONTINUED | OUTPATIENT
Start: 2024-02-20 | End: 2024-02-21

## 2024-02-20 RX ORDER — SENNOSIDES 8.6 MG
1 TABLET ORAL DAILY
Status: DISCONTINUED | OUTPATIENT
Start: 2024-02-20 | End: 2024-02-21 | Stop reason: HOSPADM

## 2024-02-20 RX ORDER — GUAIFENESIN/DEXTROMETHORPHAN 100-10MG/5
10 SYRUP ORAL EVERY 6 HOURS PRN
Status: DISCONTINUED | OUTPATIENT
Start: 2024-02-20 | End: 2024-02-21

## 2024-02-20 RX ORDER — BENZONATATE 100 MG/1
200 CAPSULE ORAL 3 TIMES DAILY
Status: DISCONTINUED | OUTPATIENT
Start: 2024-02-20 | End: 2024-02-21

## 2024-02-20 RX ADMIN — BENZONATATE 200 MG: 100 CAPSULE ORAL at 22:22

## 2024-02-20 RX ADMIN — ESCITALOPRAM OXALATE 5 MG: 10 TABLET ORAL at 11:12

## 2024-02-20 RX ADMIN — GABAPENTIN 100 MG: 100 CAPSULE ORAL at 14:32

## 2024-02-20 RX ADMIN — APIXABAN 2.5 MG: 2.5 TABLET, FILM COATED ORAL at 11:12

## 2024-02-20 RX ADMIN — BENZONATATE 200 MG: 100 CAPSULE ORAL at 16:28

## 2024-02-20 RX ADMIN — Medication 3 MG: at 22:22

## 2024-02-20 RX ADMIN — POTASSIUM CHLORIDE 40 MEQ: 1500 TABLET, EXTENDED RELEASE ORAL at 11:11

## 2024-02-20 RX ADMIN — Medication 3 MG: at 00:56

## 2024-02-20 RX ADMIN — APIXABAN 2.5 MG: 2.5 TABLET, FILM COATED ORAL at 16:27

## 2024-02-20 RX ADMIN — BENZONATATE 200 MG: 100 CAPSULE ORAL at 11:12

## 2024-02-20 RX ADMIN — Medication 12.5 MG: at 22:22

## 2024-02-20 RX ADMIN — GABAPENTIN 100 MG: 100 CAPSULE ORAL at 22:22

## 2024-02-20 RX ADMIN — Medication 12.5 MG: at 11:15

## 2024-02-20 NOTE — ASSESSMENT & PLAN NOTE
Hemoglobin 8.3 .  Iron panel and B12 were checked on prior admission last month.  Anemia consistent with chronic disease, does not require repeat workup at this time

## 2024-02-20 NOTE — ASSESSMENT & PLAN NOTE
Hemoglobin 8.3 .  Iron panel and B12 were checked on prior admission last month.  Anemia consistent with chronic disease, no new work up indicated at this time

## 2024-02-20 NOTE — PROGRESS NOTES
Patient:  APOLINAR ROBLES    MRN:  [id number]    Angelin Request ID:  [id number]    Level of care reserved:  Home Health Agency    Partner Reserved:  [facility] [place] [id number] [phone]    Clinical needs requested:    Geography searched:  [phone]    Start of Service:    Request sent:  9:14am EST on 2/20/2024 by Reema Crow    Partner reserved:  9:41am EST on 2/20/2024 by Reema Crow    Choice list shared:  9:37am EST on 2/20/2024 by Reema Crow

## 2024-02-20 NOTE — ASSESSMENT & PLAN NOTE
Patient has now had a cough for over 2 months according to granddaughter.  Patient had RSV a little over a month ago as well as pneumonia.  Both have resolved at this time, but despite a steroid burst with prednisone, there was no improvement.  Here in the hospital  with negative flu COVID RSV and no leukocytosis or elevated procalcitonin.  Patient has had echoes in 2019, 2021, 2023 which did not reveal significant dysfunction.  However given no signs of infection and no improvement in cough, will work patient up for potentially new CHF    CT chest: New small faint upper lobe predominant groundglass opacities of infectious or inflammatory etiology.  New small bilateral pleural effusions    Echo  Continue Tessalon Perles as needed

## 2024-02-20 NOTE — ED NOTES
Straight cath obtained with assistance. Pt tolerated moderately. Sample sent for analysis      Mary Grace Miranda RN  02/19/24 2030

## 2024-02-20 NOTE — QUICK NOTE
Patient seen and examined at bedside.  On approach, patient appears somnolent however does respond to voice and light sternal rub.  It appears that she is only oriented to self at this time.  Due to mental status, she was unable to answer questions posed by this writer.  No cough noted during evaluation.  Lungs clear to auscultation bilaterally. She does not appear overtly volume overloaded, despite CT revealing bilateral pleural effusions. Patient did receive IV lasix in the ED.     Potassium 3.3 this morning, repleted. Leukocytosis 12.51.    Call placed to patient's granddaughter, Molly.  At baseline, patient is oriented to self and intermittently oriented to year and situation.  However, she does report history of delirium while she is hospitalized.  She reports that since New Year's Radha, patient has had a cough that has been productive at times.  She was admitted at [facility] from 1/12 to 1/17 where she tested positive for RSV.  Granddaughter reports since admission patient has had persistent cough, denies coughing with eating.  She denies any notable triggers and states that she coughs all day long.  Of note, patient was seen by PCP on 2/7 and was treated with azithromycin for 10 days as well as 8 days of prednisone.  Granddaughter denies any improvement with this regimen.      Speech evaluated patient, placed on pureed diet and nectar thick liquids as well as aspiration precautions.  ECHO revealing mild concentric hypertrophy of left ventricle, EF 70%. Made Tessalon Perles standing 200 mg 3 times daily.  Added Mucinex 2 times daily for secretions. Added Pepcid 10 mg daily to address possible GERD component. Consideration for the addition of diuretic pending patient status.

## 2024-02-20 NOTE — PLAN OF CARE
Dysphagia 1 puree w/ nectar thick liquids by straw  Meds crushed   Aspiration precautions  VBS to assess aspiration risk

## 2024-02-20 NOTE — SPEECH THERAPY NOTE
Speech-Language Pathology Bedside Swallow Evaluation        Patient Name: Grace Clayton    Today's Date: 2/20/2024     Problem List  Principal Problem:    Chronic cough  Active Problems:    Hyperlipidemia    Anxiety    Paroxysmal atrial fibrillation (HCC)    Essential hypertension    Suspected dementia with agitation (HCC)    GERD (gastroesophageal reflux disease)    Anemia         Past Medical History  No past medical history on file.    Past Surgical History  No past surgical history on file.    Summary    Pt presents with limited assessment due to pt's refusal to take po. Mild-moderate oropharyngeal dysphagia suspected due to slow, decreased mastication, decreased oral control w/ liquids and consistent delayed, weak cough w/ thin liquids by straw.   Pt refused to sit fully upright for assessment, increasing risk for aspiration.      Recommendations:   Diet: puree/level 1 diet and nectar thick liquids   Meds: crushed with puree   Frequent Oral care: 2x/day  Aspiration precautions   Other Recommendations/ considerations: VBS to assess for aspiration risk, will follow for diet tolerance      Current Medical Status  Pt is a 89 y.o. female who [facility] to [facility]  with chronic cough. Pt presents with cough that has persisted for over two months. History obtained from chart review and granddaughter as patient is confused. She first started having a cough when she was found to have RSV and shortly after developed pneumonia. She has since recovered but the cough persisted despite a trial on prednisone. Per granddaughter, patient is assisted from bed to chair by her or her  and she is typically oriented to person and place.      Past medical history:   Please see H&P for details    Special Studies:  CT-chest: 2/19/24 New small faint upper lobe predominant groundglass opacities of infectious or inflammatory etiology.  Small bilateral pleural effusions, new from prior.    Social/Education/Vocational  Hx:  Pt lives with family    Swallow Information   Prior speech/swallowing tx: eval jan 2024, rec dysphagia 2 w/ thin liquids, no follow up recommended  Current Risks for Dysphagia & Aspiration: known history of dysphagia  Current Symptoms/Concerns:  chronic cough  Current Diet: NPO   Baseline Diet: mechanically altered/level 2 diet and thin liquids  Takes pills- unknown, crushed ?    Baseline Assessment   Behavior/Cognition: alert  Speech/Language Status: able to follow commands inconsistently and limited verbal output  Patient Positioning: upright in bed- less than 60*, refused to elevate due to c/o pain in neck     Swallow Mechanism Exam   Facial: symmetrical  Labial: WFL  Lingual: WFL  Velum: symmetrical  Mandible: adequate ROM  Dentition: edentulous  Vocal quality:clear/adequate   Volitional Cough: weak   Respiratory: RA    Consistencies Assessed and Performance   Consistencies Administered: thin liquids, nectar thick, puree, and mechanical soft solids    Oral Stage: pt able to drink from straw; slow, suspected decreased mastication w/ mech solids; appeared w/ decreased oral control w/ thin liquids, better w/ NTL by straw.     Pharyngeal Stage: swallow initiation was delayed, fair laryngeal excursion. Delayed weak cough noted with thin liquids by straw.       Esophageal Concerns: none reported      Results Reviewed with: patient, RN, and MD   Dysphagia Goals: pt will tolerate puree, with nectar thick liquids  without s/s of aspiration x1-2 and pt will participate in VBS      Ofe Gipson MA CCC-SLP  Speech Pathologist  [place] license # [id number]  [place] license # [id number]  Available via [employer]

## 2024-02-20 NOTE — ASSESSMENT & PLAN NOTE
Patient has now had a cough for over 2 months according to granddaughter.    At times productive, but other times appears to not be clearing sputum effectively   Admitted for RSV a little over a month ago as well as pneumonia, treated with IV Rocephin and transitioned to cefdinir as well as 9 days of prednisone    (-) flu COVID RSV   no leukocytosis or elevated procalcitonin  Patient has had echoes in 2019, 2021, 2023 WNL, given no signs of infection and no improvement in cough, likely sequelae of CHF   Received Lasix 20 mg IV in the ED  VBS negative for aspiration, thin liquid penetration of vocal cords   Due to uncertain etiology of cough and no improvement with current regimen, consult to pulm placed    CT chest: New small faint upper lobe predominant groundglass opacities of infectious or inflammatory etiology.  New small bilateral pleural effusions    ECHO: Mild concentric hypertrophy, EF 70%    Plan  Pulm consultation, appreciate recommendations  Diet: puree and thickened nectar liquids   Aspiration precautions   Scheduled Tessalon pearles 200 mg TID  Tussinex as needed every 12 hours

## 2024-02-20 NOTE — H&P
[facility]  H&P  Name: Grace Clayton 89 y.o. female I MRN: [id number]  Unit/Bed#: S -01 I Date of Admission: 2/19/2024   Date of Service: 2/20/2024 I Hospital Day: 1      Assessment/Plan   * Chronic cough  Assessment & Plan  Patient has now had a cough for over 2 months according to granddaughter.  Patient had RSV a little over a month ago as well as pneumonia.  Both have resolved at this time, but despite a steroid burst with prednisone, there was no improvement.  Here in the hospital  with negative flu COVID RSV and no leukocytosis or elevated procalcitonin.  Patient has had echoes in 2019, 2021, 2023 which did not reveal significant dysfunction.  However given no signs of infection and no improvement in cough, will work patient up for potentially new CHF    CT chest: New small faint upper lobe predominant groundglass opacities of infectious or inflammatory etiology.  New small bilateral pleural effusions    Echo  Continue Tessalon Perles as needed    Anemia  Assessment & Plan  Hemoglobin 8.3 .  Iron panel and B12 were checked on prior admission last month.  Anemia consistent with chronic disease, does not require repeat workup at this time    GERD (gastroesophageal reflux disease)  Assessment & Plan  Continue Protonix 40 daily    Suspected dementia with agitation (HCC)  Assessment & Plan  No official diagnosis but per granddaughter she has become more confused as of late and does appear to have episodes of sundowning.  She is quite altered this evening.  Patient was in the hospital last month and already had workup with B12 and TSH which were unrevealing.    Delirium precautions  Melatonin at bedtime  N.p.o. until speech evaluation    Essential hypertension  Assessment & Plan  Continue home Lopressor 12.5 twice daily    Paroxysmal atrial fibrillation (HCC)  Assessment & Plan  In sinus rhythm in the ED.  Continue home Lopressor 12.5 twice daily and Eliquis 2.5 twice  daily    Anxiety  Assessment & Plan  Continue home escitalopram 5 mg and gabapentin 10 mg scheduled for 2 PM and 9 PM    Hyperlipidemia  Assessment & Plan  Continue home Pravachol           VTE Pharmacologic Prophylaxis: VTE Score: 3 Moderate Risk (Score 3-4) - Pharmacological DVT Prophylaxis Ordered: apixaban (Eliquis).  Code Status: Level 3 - DNAR and DNI granddaughter will bring in documents confirming  Discussion with family: Updated  (granddaughter/caretaker) via phone.    Anticipated Length of Stay: Patient will be admitted on an inpatient basis with an anticipated length of stay of greater than 2 midnights secondary to chronic cough.    Chief Complaint: Chronic cough    History of Present Illness:  Grace Clayton is a 89 y.o. female with a PMH of A-fib on Eliquis, hypertension, anemia, suspected dementia without formal diagnosis, anxiety who presents with cough that has persisted for over two months. History obtained from chart review and granddaughter as patient is confused. She first started having a cough when she was found to have RSV and shortly after developed pneumonia. She has since recovered but the cough persisted despite a trial on prednisone. Per granddaughter, patient is assisted from bed to chair by her or her  and she is typically oriented to person and place.     Review of Systems:  Review of Systems   Unable to perform ROS: Dementia       Past Medical and Surgical History:   No past medical history on file.    No past surgical history on file.    Meds/Allergies:  Prior to Admission medications    Medication Sig Start Date End Date Taking? Authorizing Provider   apixaban (Eliquis) 2.5 mg Take 2.5 mg by mouth 2 (two) times a day   Yes Historical Provider, MD   benzonatate (TESSALON) 200 MG capsule Take 200 mg by mouth Three times daily as needed for cough 1/2/24  Yes Historical Provider, MD   Cyanocobalamin 1000 MCG CAPS Take 1,000 mcg by mouth daily 1/2/24 1/1/25 Yes  Historical Provider, MD   escitalopram (LEXAPRO) 5 mg tablet Take 1 tablet (5 mg total) by mouth daily 1/17/24 2/19/24 Yes Lilian Vargas MD   gabapentin (NEURONTIN) 100 mg capsule Take 1 capsule (100 mg total) by mouth 2 (two) times a day 1/17/24 2/19/24 Yes Lilian Vargas MD   Melatonin 5 MG/15ML LIQD Take 5 mg by mouth daily at bedtime 1/2/24  Yes Historical Provider, MD   metoprolol tartrate (LOPRESSOR) 25 mg tablet Take 12.5 mg by mouth every 12 (twelve) hours   Yes Historical Provider, MD   pantoprazole (PROTONIX) 40 mg tablet Take 40 mg by mouth every morning 1/2/24  Yes Historical Provider, MD   pravastatin (PRAVACHOL) 20 mg tablet Take 20 mg by mouth daily   Yes Historical Provider, MD   acetaminophen (TYLENOL) 325 mg tablet Take 650 mg by mouth every 6 (six) hours as needed for mild pain or fever  Patient not taking: Reported on 2/19/2024    Historical Provider, MD   bisacodyl (DULCOLAX) 10 mg suppository Insert 10 mg into the rectum daily as needed for constipation    Historical Provider, MD     I have reviewed home medications using recent Epic encounter.    Allergies:   Allergies   Allergen Reactions    Penicillins Hives       Social History:  Marital Status: Unknown   Occupation: retired  Patient Pre-hospital Living Situation: With other family member: granddaughter  Patient Pre-hospital Level of Mobility: non-ambulatory/bed bound  Patient Pre-hospital Diet Restrictions: unknown  Substance Use History:   Social History     Substance and Sexual Activity   Alcohol Use Never     Social History     Tobacco Use   Smoking Status Never   Smokeless Tobacco Never     Social History     Substance and Sexual Activity   Drug Use Never       Family History:  No family history on file.    Physical Exam:     Vitals:   Blood Pressure: 108/73 (02/20/24 0032)  Pulse: 83 (02/20/24 0032)  Temperature: 98.4 °F (36.9 °C) (02/19/24 1856)  Temp Source: Oral (02/19/24 1856)  Respirations: 20  "(02/20/24 0032)  Weight - Scale: 60 kg (132 lb 4.4 oz) (02/19/24 1856)  SpO2: 98 % (02/20/24 0032)    Physical Exam  Constitutional:       Comments: Patient only oriented to self. Sundowning and repeating \"please help me\" \"Ginny please close my robe\" and such phrases.    Cardiovascular:      Rate and Rhythm: Normal rate and regular rhythm.      Heart sounds: Normal heart sounds. No murmur heard.  Pulmonary:      Effort: Pulmonary effort is normal. No respiratory distress.      Breath sounds: Normal breath sounds. No stridor. No wheezing, rhonchi or rales.   Chest:      Chest wall: No tenderness.   Abdominal:      General: There is no distension.      Palpations: Abdomen is soft.      Tenderness: There is no abdominal tenderness. There is no guarding.   Musculoskeletal:      Right lower leg: No edema.      Left lower leg: No edema.   Skin:     Coloration: Skin is not jaundiced or pale.      Findings: No bruising.   Neurological:      Mental Status: She is disoriented.      Cranial Nerves: No cranial nerve deficit.      Motor: No weakness.          Additional Data:     Lab Results:  Results from last 7 days   Lab Units 02/1935   WBC Thousand/uL 9.36   HEMOGLOBIN g/dL 8.3*   HEMATOCRIT % 27.1*   PLATELETS Thousands/uL 182   NEUTROS PCT % 77*   LYMPHS PCT % 9*   MONOS PCT % 9   EOS PCT % 4     Results from last 7 days   Lab Units 02/1935   SODIUM mmol/L 140   POTASSIUM mmol/L 3.5   CHLORIDE mmol/L 104   CO2 mmol/L 30   BUN mg/dL 17   CREATININE mg/dL 1.04   ANION GAP mmol/L 6   CALCIUM mg/dL 8.3*   ALBUMIN g/dL 3.0*   TOTAL BILIRUBIN mg/dL 0.61   ALK PHOS U/L 58   ALT U/L 21   AST U/L 20   GLUCOSE RANDOM mg/dL 151*                 Results from last 7 days   Lab Units 02/1935   PROCALCITONIN ng/ml 0.05       Lines/Drains:  Invasive Devices       Peripheral Intravenous Line  Duration             Peripheral IV 02/19/24 Right Antecubital <1 day                        Imaging: Reviewed radiology reports " from this admission including: chest CT scan  CT chest without contrast   Final Result by Rogelio Nolan DO (02/19 2237)      New small faint upper lobe predominant groundglass opacities of infectious or inflammatory etiology.   Small bilateral pleural effusions, new from prior.                  Workstation performed: [id number]         XR chest 1 view portable    (Results Pending)       EKG and Other Studies Reviewed on Admission:   EKG: NSR. HR 60.    ** Please Note: This note has been constructed using a voice recognition system. **

## 2024-02-20 NOTE — ASSESSMENT & PLAN NOTE
No official diagnosis but per granddaughter she has become more confused as of late and does appear to have episodes of sundowning.  She is quite altered this evening.  Patient was in the hospital last month and already had workup with B12 and TSH which were unrevealing.    Delirium precautions  Melatonin at bedtime  N.p.o. until speech evaluation

## 2024-02-20 NOTE — ASSESSMENT & PLAN NOTE
No official diagnosis but per granddaughter she has become more confused as of late and does appear to have episodes of sundowning.  She is quite altered this evening.  Patient was in the hospital last month and already had workup with B12 and TSH which were unrevealing.      Melatonin at bedtime  Modified diet, pureed and nectar thick liquids

## 2024-02-20 NOTE — ASSESSMENT & PLAN NOTE
Home escitalopram 5 mg  Gabapentin 100 mg scheduled for 2 PM and 9 PM    Plan  Continue home medications

## 2024-02-20 NOTE — ED PROVIDER NOTES
"History  Chief Complaint   Patient presents with    Cough     Diagnosed with RSV 1/2/24. Recently finished course of antibiotics last week. No recent changes. At mental baseline. Complaints of diffuse body aches     89 yo F coming in from home for evaluation of a cough, productive of yellowish sputum with associated weakness and confusion described by her granddaughter as \"delusions\".  Her granddaughter, who is her caregiver, states that she was sick with RSV and then pneumonia back in early January, about 6 weeks ago.  She was treated with 2 courses of antibiotics, prednisone and is still coughing.  Over the past 1 to 2 days, she feels like her cough is been worsening again and now she is having delusions/confusion.  The patient is a poor historian, majority the history was obtained by her granddaughter over the phone.      History provided by:  Patient   used: No    Cough      Prior to Admission Medications   Prescriptions Last Dose Informant Patient Reported? Taking?   Cyanocobalamin 1000 MCG CAPS 2/19/2024  Yes Yes   Sig: Take 1,000 mcg by mouth daily   Melatonin 5 MG/15ML LIQD 2/19/2024  Yes Yes   Sig: Take 5 mg by mouth daily at bedtime   acetaminophen (TYLENOL) 325 mg tablet Not Taking  Yes No   Sig: Take 650 mg by mouth every 6 (six) hours as needed for mild pain or fever   Patient not taking: Reported on 2/19/2024   apixaban (Eliquis) 2.5 mg 2/19/2024 Family Member Yes Yes   Sig: Take 2.5 mg by mouth 2 (two) times a day   benzonatate (TESSALON) 200 MG capsule 2/19/2024  Yes Yes   Sig: Take 200 mg by mouth Three times daily as needed for cough   bisacodyl (DULCOLAX) 10 mg suppository Unknown  Yes No   Sig: Insert 10 mg into the rectum daily as needed for constipation   escitalopram (LEXAPRO) 5 mg tablet 2/19/2024  No Yes   Sig: Take 1 tablet (5 mg total) by mouth daily   gabapentin (NEURONTIN) 100 mg capsule 2/19/2024  No Yes   Sig: Take 1 capsule (100 mg total) by mouth 2 (two) times a " day   metoprolol tartrate (LOPRESSOR) 25 mg tablet 2/19/2024 Family Member Yes Yes   Sig: Take 12.5 mg by mouth every 12 (twelve) hours   pantoprazole (PROTONIX) 40 mg tablet 2/19/2024  Yes Yes   Sig: Take 40 mg by mouth every morning   pravastatin (PRAVACHOL) 20 mg tablet 2/19/2024  Yes Yes   Sig: Take 20 mg by mouth daily      Facility-Administered Medications: None       Past Medical History:   Diagnosis Date    Atrial fibrillation (HCC)     Dementia (HCC)     Hypertension        No past surgical history on file.    No family history on file.  I have reviewed and agree with the history as documented.    E-Cigarette/Vaping     E-Cigarette/Vaping Substances     Social History     Tobacco Use    Smoking status: Never    Smokeless tobacco: Never   Substance Use Topics    Alcohol use: Never    Drug use: Never       Review of Systems   Constitutional:  Positive for fatigue.   Respiratory:  Positive for cough.    All other systems reviewed and are negative.      Physical Exam  Physical Exam  Vitals and nursing note reviewed.   Constitutional:       General: She is not in acute distress.     Appearance: Normal appearance. She is well-developed and normal weight. She is not ill-appearing, toxic-appearing or diaphoretic.   HENT:      Head: Normocephalic and atraumatic.      Right Ear: External ear normal.      Left Ear: External ear normal.      Nose: Nose normal.      Mouth/Throat:      Mouth: Mucous membranes are moist.      Pharynx: Oropharynx is clear.   Eyes:      Conjunctiva/sclera: Conjunctivae normal.   Cardiovascular:      Rate and Rhythm: Normal rate and regular rhythm.      Pulses: Normal pulses.      Heart sounds: Normal heart sounds.   Pulmonary:      Effort: Pulmonary effort is normal. No respiratory distress.      Breath sounds: Normal breath sounds. No stridor. No wheezing, rhonchi or rales.   Chest:      Chest wall: No tenderness.   Abdominal:      General: Abdomen is flat. Bowel sounds are normal. There  is no distension or abdominal bruit. There are no signs of injury.      Palpations: Abdomen is soft. There is no shifting dullness.      Tenderness: There is no abdominal tenderness.   Genitourinary:     Adnexa: Right adnexa normal and left adnexa normal.   Musculoskeletal:         General: Normal range of motion.      Cervical back: Normal range of motion and neck supple.   Skin:     General: Skin is warm and dry.      Capillary Refill: Capillary refill takes less than 2 seconds.   Neurological:      General: No focal deficit present.      Mental Status: She is alert and oriented to person, place, and time. Mental status is at baseline.   Psychiatric:         Mood and Affect: Mood normal.         Behavior: Behavior normal.         Vital Signs  ED Triage Vitals   Temperature Pulse Respirations Blood Pressure SpO2   02/19/24 1856 02/19/24 1856 02/19/24 1856 02/19/24 1856 02/19/24 1856   98.4 °F (36.9 °C) 60 18 150/64 97 %      Temp Source Heart Rate Source Patient Position - Orthostatic VS BP Location FiO2 (%)   02/19/24 1856 02/19/24 1856 02/19/24 1856 02/19/24 1856 --   Oral Monitor Lying Right arm       Pain Score       02/20/24 0033       10 - Worst Possible Pain           Vitals:    02/20/24 1515 02/20/24 2130 02/21/24 0814 02/21/24 1524   BP: 115/59 113/64 129/67 113/58   Pulse: 77 85 77 72   Patient Position - Orthostatic VS:             Visual Acuity      ED Medications  Medications   furosemide (LASIX) injection 20 mg (20 mg Intravenous Given 2/19/24 2307)   potassium chloride (Klor-Con M20) CR tablet 40 mEq (40 mEq Oral Given 2/20/24 1111)   barium sulfate 60 % cream 2.5 g (2.5 g Oral Given 2/21/24 0926)   barium sulfate (LIQUID E-Z-PAQUE) oral suspension 355 mL (75 mL Oral Given 2/21/24 0926)   barium sulfate 98 % oral suspension 135 mL (50 mL Oral Given 2/21/24 0926)       Diagnostic Studies  Results Reviewed       Procedure Component Value Units Date/Time    HS Troponin I 2hr [[id number]]  (Normal)  Collected: 02/19/24 2115    Lab Status: Final result Specimen: Blood from Arm, Right Updated: 02/19/24 2149     hs TnI 2hr 40 ng/L      Delta 2hr hsTnI -3 ng/L     Urine Microscopic [[id number]]  (Abnormal) Collected: 02/19/24 2028    Lab Status: Final result Specimen: Urine, Straight Cath Updated: 02/19/24 2106     RBC, UA 10-20 /hpf      WBC, UA 1-2 /hpf      Epithelial Cells Occasional /hpf      Bacteria, UA Occasional /hpf     UA w Reflex to Microscopic w Reflex to Culture [[id number]]  (Abnormal) Collected: 02/19/24 2028    Lab Status: Final result Specimen: Urine, Straight Cath Updated: 02/19/24 2035     Color, UA Light Yellow     Clarity, UA Clear     Specific Gravity, UA 1.010     pH, UA 6.5     Leukocytes, UA Negative     Nitrite, UA Negative     Protein, UA Trace mg/dl      Glucose, UA Negative mg/dl      Ketones, UA Negative mg/dl      Urobilinogen, UA <2.0 mg/dl      Bilirubin, UA Negative     Occult Blood, UA Moderate    FLU/RSV/COVID - if FLU/RSV clinically relevant [[id number]]  (Normal) Collected: 02/1935    Lab Status: Final result Specimen: Nares from Nose Updated: 02/19/24 2025     SARS-CoV-2 Negative     INFLUENZA A PCR Negative     INFLUENZA B PCR Negative     RSV PCR Negative    Narrative:      FOR PEDIATRIC PATIENTS - copy/paste COVID Guidelines URL to browser: [email or web address]    SARS-CoV-2 assay is a Nucleic Acid Amplification assay intended for the  qualitative detection of nucleic acid from SARS-CoV-2 in nasopharyngeal  swabs. Results are for the presumptive identification of SARS-CoV-2 RNA.    Positive results are indicative of infection with SARS-CoV-2, the virus  causing COVID-19, but do not rule out bacterial infection or co-infection  with other viruses. Laboratories within the [place] and its  territories are required to report all positive results to the appropriate  public health authorities. Negative results do not  preclude SARS-CoV-2  infection and should not be used as the sole basis for treatment or other  patient management decisions. Negative results must be combined with  clinical observations, patient history, and epidemiological information.  This test has not been FDA cleared or approved.    This test has been authorized by FDA under an Emergency Use Authorization  (EUA). This test is only authorized for the duration of time the  declaration that circumstances exist justifying the authorization of the  emergency use of an in vitro diagnostic tests for detection of SARS-CoV-2  virus and/or diagnosis of COVID-19 infection under section 564(b)(1) of  the Act, 21 U.S.C. 360bbb-3(b)(1), unless the authorization is terminated  or revoked sooner. The test has been validated but independent review by FDA  and CLIA is pending.    Test performed using [employer] GeneXpert: This RT-PCR assay targets N2,  a region unique to SARS-CoV-2. A conserved region in the E-gene was chosen  for pan-Sarbecovirus detection which includes SARS-CoV-2.    According to CMS-2020-01-R, this platform meets the definition of high-throughput technology.    Procalcitonin [[id number]]  (Normal) Collected: 02/1935    Lab Status: Final result Specimen: Blood from Arm, Right Updated: 02/19/24 2014     Procalcitonin 0.05 ng/ml     HS Troponin 0hr (reflex protocol) [[id number]]  (Normal) Collected: 02/1935    Lab Status: Final result Specimen: Blood from Arm, Left Updated: 02/19/24 2011     hs TnI 0hr 43 ng/L     B-Type Natriuretic Peptide(BNP) [[id number]]  (Abnormal) Collected: 02/1935    Lab Status: Final result Specimen: Blood from Arm, Left Updated: 02/19/24 2010      pg/mL     Comprehensive metabolic panel [[id number]]  (Abnormal) Collected: 02/1935    Lab Status: Final result Specimen: Blood from Arm, Left Updated: 02/19/24 2004     Sodium 140 mmol/L      Potassium 3.5 mmol/L      Chloride 104 mmol/L      CO2 30 mmol/L       ANION GAP 6 mmol/L      BUN 17 mg/dL      Creatinine 1.04 mg/dL      Glucose 151 mg/dL      Calcium 8.3 mg/dL      Corrected Calcium 9.1 mg/dL      AST 20 U/L      ALT 21 U/L      Alkaline Phosphatase 58 U/L      Total Protein 6.1 g/dL      Albumin 3.0 g/dL      Total Bilirubin 0.61 mg/dL      eGFR 44 ml/min/1.73sq m     Narrative:      National Kidney Disease Foundation guidelines for Chronic Kidney Disease (CKD):     Stage 1 with normal or high GFR (GFR > 90 mL/min/1.73 square meters)    Stage 2 Mild CKD (GFR = 60-89 mL/min/1.73 square meters)    Stage 3A Moderate CKD (GFR = 45-59 mL/min/1.73 square meters)    Stage 3B Moderate CKD (GFR = 30-44 mL/min/1.73 square meters)    Stage 4 Severe CKD (GFR = 15-29 mL/min/1.73 square meters)    Stage 5 End Stage CKD (GFR <15 mL/min/1.73 square meters)  Note: GFR calculation is accurate only with a steady state creatinine    CBC and differential [[id number]]  (Abnormal) Collected: 02/1935    Lab Status: Final result Specimen: Blood from Arm, Right Updated: 02/19/24 1949     WBC 9.36 Thousand/uL      RBC 2.67 Million/uL      Hemoglobin 8.3 g/dL      Hematocrit 27.1 %       fL      MCH 31.1 pg      MCHC 30.6 g/dL      RDW 16.8 %      MPV 10.8 fL      Platelets 182 Thousands/uL      nRBC 0 /100 WBCs      Neutrophils Relative 77 %      Immat GRANS % 1 %      Lymphocytes Relative 9 %      Monocytes Relative 9 %      Eosinophils Relative 4 %      Basophils Relative 0 %      Neutrophils Absolute 7.27 Thousands/µL      Immature Grans Absolute 0.05 Thousand/uL      Lymphocytes Absolute 0.81 Thousands/µL      Monocytes Absolute 0.87 Thousand/µL      Eosinophils Absolute 0.33 Thousand/µL      Basophils Absolute 0.03 Thousands/µL                    FL barium swallow video w speech   Final Result by SYSTEMGENERATED, DOCUMENTATION (02/21 0268)      FL barium swallow video w speech   Final Result by  (02/21 6643)      CT chest without contrast   Final Result by Rogelio Nolan,  DO (02/19 2237)      New small faint upper lobe predominant groundglass opacities of infectious or inflammatory etiology.   Small bilateral pleural effusions, new from prior.                  Workstation performed: [id number]         XR chest 1 view portable   Final Result by Carolynn Varner MD (02/20 1120)   No acute consolidation or congestion      Workstation performed: [id number]                    Procedures  Procedures         ED Course                                             Medical Decision Making  Wt Readings from Last 3 Encounters:  02/19/24 : 60 kg (132 lb 4.4 oz)  01/17/24 : 70.5 kg (155 lb 6.8 oz)  01/03/24 : 56.4 kg (124 lb 5.4 oz)    89 yo F with worsening cough, c/o intermittent dyspnea, confusion w/ delusions intermittently per her granddaughter.  ED workup: CT chest pursued for further clarification of etiology of patient's dyspnea, despite multiple rounds of antibiotics and steroids.  CT revealed small b/l pleural effusions and scattered groundglass opacities. Infectious etiology vs. Cardiac (new CHF?) considered. BNP elevated, c/w CHF.  Discussed case w/ [facility] who accepted the patient to their service for admission, Dr Ramses Rodriguez accepting resident.      Amount and/or Complexity of Data Reviewed  Labs: ordered. Decision-making details documented in ED Course.  Radiology: ordered. Decision-making details documented in ED Course.  ECG/medicine tests: ordered and independent interpretation performed. Decision-making details documented in ED Course.    Risk  Prescription drug management.  Decision regarding hospitalization.             Disposition  Final diagnoses:   Dyspnea   Cough   Pleural effusion, bilateral     Time reflects when diagnosis was documented in both MDM as applicable and the Disposition within this note       Time User Action Codes Description Comment    2/19/2024 11:00 PM Brayan Perez Add [R06.00] Dyspnea     2/19/2024 11:00 PM Brayan Perez Add [R05.9] Cough      2/19/2024 11:00 PM Brayan Perez Add [J90] Pleural effusion, bilateral     2/21/2024  1:18 PM Candelaria Lucas Add [R05.3] Chronic cough     2/21/2024  3:25 PM Candelaria Lucas Add [K21.9] GERD (gastroesophageal reflux disease)           ED Disposition       ED Disposition   Admit    Condition   Stable    Date/Time   Mon Feb 19, 2024 11:02 PM    Comment   Case was discussed with Dr Ramses Escalera and the patient's admission status was agreed to be Admission Status: inpatient status to the service of Dr. Jorgensen .               MD Documentation      Flowsheet Row Most Recent Value   Transported by (Company and Unit #) [facility]          RN Documentation      Flowsheet Row Most Recent Value   Transported by (Company and Unit #) [facility]          Follow-up Information       Follow up With Specialties Details Why Contact Info    [facility] Follow up Home care agency to resume services. [address]  [address]  [facility] [address]  [phone]              Discharge Medication List as of 2/21/2024  3:53 PM        START taking these medications    Details   albuterol (2.5 mg/3 mL) 0.083 % nebulizer solution Take 3 mL (2.5 mg total) by nebulization every 6 (six) hours as needed for wheezing or shortness of breath, Starting Wed 2/21/2024, Until Fri 3/22/2024 at 2359, Normal      budesonide (PULMICORT) 0.5 mg/2 mL nebulizer solution Take 2 mL (0.5 mg total) by nebulization every 12 (twelve) hours Rinse mouth after use., Starting Wed 2/21/2024, Until Tue 5/21/2024, Normal      famotidine (PEPCID) 10 mg tablet Take 1 tablet (10 mg total) by mouth daily, Starting Wed 2/21/2024, Until Sun 4/21/2024, Normal           CONTINUE these medications which have NOT CHANGED    Details   apixaban (Eliquis) 2.5 mg Take 2.5 mg by mouth 2 (two) times a day, Historical Med      Cyanocobalamin 1000 MCG CAPS Take 1,000 mcg by mouth daily, Starting Tue 1/2/2024, Until Wed 1/1/2025, Historical Med       escitalopram (LEXAPRO) 5 mg tablet Take 1 tablet (5 mg total) by mouth daily, Starting Wed 1/17/2024, Until Mon 2/19/2024, Normal      gabapentin (NEURONTIN) 100 mg capsule Take 1 capsule (100 mg total) by mouth 2 (two) times a day, Starting Wed 1/17/2024, Until Mon 2/19/2024, Normal      Melatonin 5 MG/15ML LIQD Take 5 mg by mouth daily at bedtime, Starting Tue 1/2/2024, Historical Med      metoprolol tartrate (LOPRESSOR) 25 mg tablet Take 12.5 mg by mouth every 12 (twelve) hours, Historical Med      pantoprazole (PROTONIX) 40 mg tablet Take 40 mg by mouth every morning, Starting Tue 1/2/2024, Historical Med      pravastatin (PRAVACHOL) 20 mg tablet Take 20 mg by mouth daily, Historical Med           STOP taking these medications       benzonatate (TESSALON) 200 MG capsule Comments:   Reason for Stopping:         acetaminophen (TYLENOL) 325 mg tablet Comments:   Reason for Stopping:         bisacodyl (DULCOLAX) 10 mg suppository Comments:   Reason for Stopping:               Outpatient Discharge Orders   Nebulizer Supplies     EXTERNAL: Ambulatory Referral to Home Health   Standing Status: Future Standing Exp. Date: 02/21/25      Ambulatory referral to Pulmonology   Standing Status: Future Standing Exp. Date: 02/21/25      Discharge Diet       PDMP Review       None            ED Provider  Electronically Signed by             Brayan Perez DO  02/22/24 8145

## 2024-02-20 NOTE — DISCHARGE SUMMARY
[facility]  Discharge- Grace Clayton 1/26/1925, 89 y.o. female MRN: [id number]  Unit/Bed#: S -01 Encounter: [id number]  Primary Care Provider: No primary care provider on file.   Date and time admitted to hospital: 2/19/2024  6:51 PM    * Post-infectious Bronchiolitis  Assessment & Plan  Patient has now had a cough for over 2 months according to granddaughter.    At times productive, but other times appears to not be clearing sputum effectively   Admitted for RSV a little over a month ago as well as pneumonia, treated with IV Rocephin and transitioned to cefdinir as well as 9 days of prednisone    (-) flu COVID RSV   no leukocytosis or elevated procalcitonin  Patient has had echoes in 2019, 2021, 2023 WNL, given no signs of infection and no improvement in cough, likely sequelae of CHF   Received Lasix 20 mg IV in the ED  VBS negative for aspiration, thin liquid penetration of vocal cords   Per pulm, patient symptoms likely due to postinfectious bronchiolitis     CT chest: New small faint upper lobe predominant groundglass opacities of infectious or inflammatory etiology.  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  She is quite altered this evening.  Patient was in the hospital last month and already had workup with B12 and TSH which were unrevealing.      Melatonin at bedtime  Modified diet, pureed and nectar thick liquids    Anemia  Assessment & Plan  Hemoglobin 8.3 .  Iron panel and B12 were checked on prior admission last  month.  Anemia consistent with chronic disease, no new work up indicated at this time    GERD (gastroesophageal reflux disease)  Assessment & Plan  Continue Protonix 40 daily  Pepcid 10 mg daily     Essential hypertension  Assessment & Plan  Continue home Lopressor 12.5 twice daily    Paroxysmal atrial fibrillation (HCC)  Assessment & Plan  Remains in sinus rhythm    Continue home Lopressor 12.5 twice daily and Eliquis 2.5 twice daily    Anxiety  Assessment & Plan  Home escitalopram 5 mg  Gabapentin 100 mg scheduled for 2 PM and 9 PM    Plan  Continue home medications     Hyperlipidemia  Assessment & Plan  Continue home Pravachol        Medical Problems       Resolved Problems  Date Reviewed: 2/21/2024   None       Discharging Resident: Candelaria Lucas DO  Discharging Attending: Nacho Jorgensen MD  PCP: No primary care provider on file.  Admission Date:   Admission Orders (From admission, onward)       Ordered        02/19/24 2303  INPATIENT ADMISSION  Once                          Discharge Date: 02/21/24    Consultations During Hospital Stay:  Speech  Pulmonology    Procedures Performed:   Video barium swallow   CT chest    Significant Findings / Test Results:     CT chest revealed upper lobe ground glass opacities as well as bilateral pleural effusions     Incidental Findings:   No incidental findings noted this admission      Test Results Pending at Discharge (will require follow up):  No pending results at the time of discharge      Outpatient Tests Requested:  No further outpatient testing required    Complications:  No complications this admission     Reason for Admission: Chronic cough     Hospital Course:   Grace Clayton is a 89 y.o. female patient who originally presented to the hospital on 2/19/2024 due to persistent cough over the past two months. Patient reportedly had cough since New Years. She was admitted in January for RSV and pneumonia and her cough failed to improve after discharge. She was  "recently seen by her PCP and started on prednisone and azithromycin without relief so her granddaughter brought her for evaluation. In the ED, CT chest was performed which revealed new upper lobe ground glass opacities and bilateral pleural effusions. Further, her BNP was 512. She was admitted for treatment of chronic cough and to rule out CHF. She was placed on scheduled Tessalon pearles for cough and as needed Robitussin. Pepcid was also added to her GERD treatment to address possibility of the cough being related to GERD. Echocardiogram was performed which showed mild concentric hypertrophy and EF of 70%. Speech evaluated the patient and transitioned her to pureed dysphagia diet and thickened liquids. They also recommended VBS to evaluate for aspiration risk which revealed no evidence of aspiration. Pulmonology was consulted and deemed that the patient's cough was due to postinfectious bronchiolitis. She was discharged home with budesonide as well as albuterol nebulizer treatments.     Please see above list of diagnoses and related plan for additional information.     Condition at Discharge: stable    Discharge Day Visit / Exam:   Subjective:  Grace remains oriented only to self, responding \"yeah\" to most questions. She offered no acute complaints.   Vitals: Blood Pressure: 113/58 (02/21/24 1524)  Pulse: 72 (02/21/24 1524)  Temperature: 97.7 °F (36.5 °C) (02/21/24 1524)  Temp Source: Oral (02/19/24 1856)  Respirations: 17 (02/20/24 2130)  Height: 5' 2\" (157.5 cm) (02/20/24 1030)  Weight - Scale: 59.9 kg (132 lb) (02/20/24 1030)  SpO2: 92 % (02/21/24 1524)  Exam:   Physical Exam  Constitutional:       General: She is sleeping.      Appearance: She is normal weight.   HENT:      Nose: Nose normal.      Mouth/Throat:      Mouth: Mucous membranes are dry.   Eyes:      Extraocular Movements: Extraocular movements intact.      Pupils: Pupils are equal, round, and reactive to light.   Cardiovascular:      Rate and " Rhythm: Normal rate.      Pulses: Normal pulses.      Heart sounds: Normal heart sounds.   Pulmonary:      Effort: Pulmonary effort is normal.      Breath sounds: Normal breath sounds.   Abdominal:      General: Abdomen is flat. Bowel sounds are normal.   Musculoskeletal:      Right lower leg: No edema.      Left lower leg: No edema.   Skin:     General: Skin is warm and dry.      Capillary Refill: Capillary refill takes less than 2 seconds.   Neurological:      Mental Status: She is easily aroused. She is disoriented.      Comments: Oriented only to self at baseline   Psychiatric:         Mood and Affect: Mood normal.         Behavior: Behavior normal.          Discussion with Family: Updated  (granddaughter) via phone.    Discharge instructions/Information to patient and family:   See after visit summary for information provided to patient and family.      Provisions for Follow-Up Care:  See after visit summary for information related to follow-up care and any pertinent home health orders.      Mobility at time of Discharge:   Basic Mobility Inpatient Raw Score: 11  JH-HLM Goal: 4: Move to chair/commode  JH-HLM Achieved: 2: Bed activities/Dependent transfer  HLM Goal achieved. Continue to encourage appropriate mobility.     Disposition:   Home with VNA Services (Reminder: Complete face to face encounter)    Planned Readmission: No    Discharge Medications:  See after visit summary for reconciled discharge medications provided to patient and/or family.      **Please Note: This note may have been constructed using a voice recognition system**

## 2024-02-20 NOTE — CASE MANAGEMENT
Case Management Assessment & Discharge Planning Note    Patient name Grace García S /S -01 MRN [id number]  : 1925 Date 2024       Current Admission Date: 2024  Current Admission Diagnosis:Chronic cough   Patient Active Problem List    Diagnosis Date Noted    Chronic cough 2024    Anemia 01/15/2024    Suspected dementia with agitation (HCC) 2024    RSV infection with wrosening cough 2024    GERD (gastroesophageal reflux disease) 2024    Non-MI troponin elevation 2024    Generalized weakness 2023    Paroxysmal atrial fibrillation (HCC) 2023    Ambulatory dysfunction 2023    Anxiety 2023    Hyperlipidemia 2012    Essential hypertension 2012      LOS (days): 1  Geometric Mean LOS (GMLOS) (days): 2.3  Days to GMLOS:1.9     OBJECTIVE:    Risk of Unplanned Readmission Score: 17.94         Current admission status: Inpatient       Preferred Pharmacy:   [id number] [address] [address]  Phone: [phone] Fax: [phone]    Primary Care Provider: No primary care provider on file.    Primary Insurance: MEDICARE  Secondary Insurance: [employer]    ASSESSMENT:  Active Health Care Proxies    There are no active Health Care Proxies on file.                      Patient Information  Admitted from:: Home  Mental Status: Confused  During Assessment patient was accompanied by: Not accompanied during assessment  Assessment information provided by:: Other - please comment (granddaughter)  Primary Caregiver: Family  Caregiver's Name:: Molly Braden  Caregiver's Relationship to Patient:: Family Member  Caregiver's Telephone Number:: [phone]  Support Systems: Family members  County of Residence: [place]  What city do you live in?: [place]  Home entry access options. Select all that apply.: Stairs  Number of steps to enter  home.: 5  Type of Current Residence: [facility]  Living Arrangements: Lives w/ Extended Family    Activities of Daily Living Prior to Admission  Functional Status: Assistance  Completes ADLs independently?: No  Level of ADL dependence: Assistance  Ambulates independently?: No  Level of ambulatory dependence: Assistance  Does patient use assisted devices?: Yes  Assisted Devices (DME) used: Rollator, Hospital Bed  Does patient currently own DME?: Yes  What DME does the patient currently own?: Rollator, Hospital Bed  Does patient have a history of Outpatient Therapy (PT/OT)?: Yes  Does the patient have a history of Short-Term Rehab?: Yes  Does patient have a history of HHC?: Yes  Does patient currently have HHC?: Yes    Current Home Health Care  Type of Current Home Care Services: Home health aide, Home PT, Home OT, Nurse visit  Current Home Health Agency:: [facility]  Current Home Health Follow-Up Provider:: PCP    Patient Information Continued  Does patient have prescription coverage?: Yes  Does patient receive dialysis treatments?: No         Means of Transportation  Means of Transport to Appts:: Family transport      Social Determinants of Health (SDOH)      Flowsheet Row Most Recent Value   Housing Stability    In the last 12 months, was there a time when you were not able to pay the mortgage or rent on time? N   In the last 12 months, was there a time when you did not have a steady place to sleep or slept in a shelter (including now)? N   Transportation Needs    In the past 12 months, has lack of transportation kept you from medical appointments or from getting medications? no   In the past 12 months, has lack of transportation kept you from meetings, work, or from getting things needed for daily living? No   Food Insecurity    Within the past 12 months, you worried that your food would run out before you got the money to buy more. Never true   Within the past 12 months, the food you bought just didn't last and you didn't  have money to get more. Never true   Utilities    In the past 12 months has the electric, gas, oil, or water company threatened to shut off services in your home? No            DISCHARGE DETAILS:    Discharge planning discussed with:: patient's granddaughter Molly  Freedom of Choice: Yes  Comments - Freedom of Choice: CM contacted pt's ayaan Barnes via phone re: assessment and dcp. Billtr confirmed all assessment info remains the same from previous admission. Pt lives w/ billtr in [facility], [address], [facility]. Patient receives assistance from [facility] w/ ADLS and IADLS. Patient is incontinent - wears briefs. Patient also has rollator but does not use it - relys on billwalter and grandson-in-law to assist her in walking to and from bed to chair. Patient is current with [facility] (PT/OT/SN/HHA) (HHA comes once a week to assist with sponge baths) - bill requested referral for ROSE upon return to home - referral sent to [facility] via [facility]in. Ayaan does not want STR for patient. Ayaan is pt's POA - ayaan emailed POA paperwork to this CM. POA forms printed, pt labels attached and placed in scan bin for pt chart. Pt will need BLS transport home when ready for d/c. CM to continue to follow for further CM dcp needs.  CM contacted family/caregiver?: Yes  Were Treatment Team discharge recommendations reviewed with patient/caregiver?: Yes  Did patient/caregiver verbalize understanding of patient care needs?: Yes  Were patient/caregiver advised of the risks associated with not following Treatment Team discharge recommendations?: Yes    Contacts  Patient Contacts: Molly Braden  Relationship to Patient:: Family  Contact Method: Phone  Phone Number: [phone]  Reason/Outcome: Continuity of Care, Emergency Contact, Discharge Planning, Referral    Requested Home Health Care         Is the patient interested in HHC at discharge?: Yes  Home Health Discipline requested:: Home Health Aide, Nursing, Occupational Therapy, Physical  Therapy  Home Health Agency Name:: [facility] External Referral Reason (only applicable if external HHA name selected): Patient has established relationship with provider  Home Health Follow-Up Provider:: PCP  Home Health Services Needed:: Evaluate Functional Status and Safety, Gait/ADL Training, Strengthening/Theraputic Exercises to Improve Function  Homebound Criteria Met:: Requires the Assistance of Another Person for Safe Ambulation or to Leave the Home, Uses an Assist Device (i.e. cane, walker, etc)  Supporting Clincal Findings:: Limited Endurance, Fatigues Easliy in Short Distances    DME Referral Provided  Referral made for DME?: No    Other Referral/Resources/Interventions Provided:  Interventions: HHC  Referral Comments: Referral sent to [facility] via [employer] for [facility]. AVS updated.         Treatment Team Recommendation: Home with Home Health Care  Discharge Destination Plan:: Home with Home Health Care  Transport at Discharge : BLS Ambulance

## 2024-02-21 ENCOUNTER — APPOINTMENT (INPATIENT)
Dept: RADIOLOGY | Facility: HOSPITAL | Age: 89
DRG: 202 | End: 2024-02-21
Payer: MEDICARE

## 2024-02-21 VITALS
OXYGEN SATURATION: 92 % | TEMPERATURE: 97.7 F | RESPIRATION RATE: 17 BRPM | SYSTOLIC BLOOD PRESSURE: 113 MMHG | WEIGHT: 132 LBS | DIASTOLIC BLOOD PRESSURE: 58 MMHG | HEART RATE: 72 BPM | BODY MASS INDEX: 24.29 KG/M2 | HEIGHT: 62 IN

## 2024-02-21 PROBLEM — J21.9 BRONCHIOLITIS: Status: RESOLVED | Noted: 2024-02-20 | Resolved: 2024-02-21

## 2024-02-21 PROBLEM — J21.9 BRONCHIOLITIS: Status: ACTIVE | Noted: 2024-02-20

## 2024-02-21 LAB
ANION GAP SERPL CALCULATED.3IONS-SCNC: 8 MMOL/L
BUN SERPL-MCNC: 18 MG/DL (ref 5–25)
CALCIUM SERPL-MCNC: 8.9 MG/DL (ref 8.4–10.2)
CHLORIDE SERPL-SCNC: 105 MMOL/L (ref 96–108)
CO2 SERPL-SCNC: 30 MMOL/L (ref 21–32)
CREAT SERPL-MCNC: 0.98 MG/DL (ref 0.6–1.3)
DME PARACHUTE DELIVERY DATE REQUESTED: NORMAL
DME PARACHUTE ITEM DESCRIPTION: NORMAL
DME PARACHUTE ORDER STATUS: NORMAL
DME PARACHUTE SUPPLIER NAME: NORMAL
DME PARACHUTE SUPPLIER PHONE: NORMAL
ERYTHROCYTE [DISTWIDTH] IN BLOOD BY AUTOMATED COUNT: 16.4 % (ref 11.6–15.1)
GFR SERPL CREATININE-BSD FRML MDRD: 47 ML/MIN/1.73SQ M
GLUCOSE SERPL-MCNC: 90 MG/DL (ref 65–140)
HCT VFR BLD AUTO: 31.3 % (ref 34.8–46.1)
HGB BLD-MCNC: 9.7 G/DL (ref 11.5–15.4)
MCH RBC QN AUTO: 31.2 PG (ref 26.8–34.3)
MCHC RBC AUTO-ENTMCNC: 31 G/DL (ref 31.4–37.4)
MCV RBC AUTO: 101 FL (ref 82–98)
PLATELET # BLD AUTO: 224 THOUSANDS/UL (ref 149–390)
PMV BLD AUTO: 10.6 FL (ref 8.9–12.7)
POTASSIUM SERPL-SCNC: 3.6 MMOL/L (ref 3.5–5.3)
RBC # BLD AUTO: 3.11 MILLION/UL (ref 3.81–5.12)
SODIUM SERPL-SCNC: 143 MMOL/L (ref 135–147)
WBC # BLD AUTO: 10.67 THOUSAND/UL (ref 4.31–10.16)

## 2024-02-21 PROCEDURE — 85027 COMPLETE CBC AUTOMATED: CPT

## 2024-02-21 PROCEDURE — 80048 BASIC METABOLIC PNL TOTAL CA: CPT

## 2024-02-21 PROCEDURE — 92611 MOTION FLUOROSCOPY/SWALLOW: CPT

## 2024-02-21 PROCEDURE — 99223 1ST HOSP IP/OBS HIGH 75: CPT | Performed by: INTERNAL MEDICINE

## 2024-02-21 PROCEDURE — 74230 X-RAY XM SWLNG FUNCJ C+: CPT

## 2024-02-21 PROCEDURE — 99239 HOSP IP/OBS DSCHRG MGMT >30: CPT | Performed by: HOSPITALIST

## 2024-02-21 RX ORDER — MUSCLE RUB CREAM 100; 150 MG/G; MG/G
CREAM TOPICAL 4 TIMES DAILY PRN
Status: DISCONTINUED | OUTPATIENT
Start: 2024-02-21 | End: 2024-02-21 | Stop reason: HOSPADM

## 2024-02-21 RX ORDER — BUDESONIDE 0.5 MG/2ML
0.5 INHALANT ORAL
Qty: 360 ML | Refills: 0 | Status: SHIPPED | OUTPATIENT
Start: 2024-02-21 | End: 2024-05-21

## 2024-02-21 RX ORDER — ALBUTEROL SULFATE 2.5 MG/3ML
2.5 SOLUTION RESPIRATORY (INHALATION) EVERY 6 HOURS PRN
Qty: 25 ML | Refills: 0 | Status: SHIPPED | OUTPATIENT
Start: 2024-02-21 | End: 2024-03-22

## 2024-02-21 RX ORDER — BENZONATATE 100 MG/1
200 CAPSULE ORAL 3 TIMES DAILY
Status: DISCONTINUED | OUTPATIENT
Start: 2024-02-21 | End: 2024-02-21 | Stop reason: HOSPADM

## 2024-02-21 RX ORDER — FAMOTIDINE 10 MG
10 TABLET ORAL DAILY
Qty: 30 TABLET | Refills: 1 | Status: SHIPPED | OUTPATIENT
Start: 2024-02-21 | End: 2024-04-21

## 2024-02-21 RX ORDER — BENZONATATE 100 MG/1
300 CAPSULE ORAL 3 TIMES DAILY
Status: DISCONTINUED | OUTPATIENT
Start: 2024-02-21 | End: 2024-02-21

## 2024-02-21 RX ORDER — HYDROCODONE POLISTIREX AND CHLORPHENIRAMINE POLISTIREX 10; 8 MG/5ML; MG/5ML
5 SUSPENSION, EXTENDED RELEASE ORAL EVERY 12 HOURS PRN
Status: DISCONTINUED | OUTPATIENT
Start: 2024-02-21 | End: 2024-02-21 | Stop reason: HOSPADM

## 2024-02-21 RX ORDER — BUDESONIDE 0.5 MG/2ML
0.5 INHALANT ORAL
Status: DISCONTINUED | OUTPATIENT
Start: 2024-02-21 | End: 2024-02-21 | Stop reason: HOSPADM

## 2024-02-21 RX ADMIN — BENZONATATE 200 MG: 100 CAPSULE ORAL at 09:40

## 2024-02-21 RX ADMIN — FAMOTIDINE 10 MG: 20 TABLET, FILM COATED ORAL at 09:45

## 2024-02-21 RX ADMIN — APIXABAN 2.5 MG: 2.5 TABLET, FILM COATED ORAL at 09:38

## 2024-02-21 RX ADMIN — ESCITALOPRAM OXALATE 5 MG: 10 TABLET ORAL at 09:42

## 2024-02-21 RX ADMIN — SENNOSIDES 8.6 MG: 8.6 TABLET, FILM COATED ORAL at 09:47

## 2024-02-21 RX ADMIN — Medication 12.5 MG: at 09:46

## 2024-02-21 RX ADMIN — PANTOPRAZOLE SODIUM 40 MG: 40 TABLET, DELAYED RELEASE ORAL at 06:36

## 2024-02-21 RX ADMIN — BENZONATATE 200 MG: 100 CAPSULE ORAL at 17:03

## 2024-02-21 RX ADMIN — APIXABAN 2.5 MG: 2.5 TABLET, FILM COATED ORAL at 17:03

## 2024-02-21 RX ADMIN — PRAVASTATIN SODIUM 20 MG: 20 TABLET ORAL at 09:46

## 2024-02-21 RX ADMIN — GABAPENTIN 100 MG: 100 CAPSULE ORAL at 14:13

## 2024-02-21 NOTE — PROGRESS NOTES
[facility]  Progress Note  Name: Grace Clayton I  MRN: [id number]  Unit/Bed#: S -01 I Date of Admission: 2/19/2024   Date of Service: 2/21/2024 I Hospital Day: 2    Assessment/Plan   * Chronic cough  Assessment & Plan  Patient has now had a cough for over 2 months according to granddaughter.    At times productive, but other times appears to not be clearing sputum effectively   Admitted for RSV a little over a month ago as well as pneumonia, treated with IV Rocephin and transitioned to cefdinir as well as 9 days of prednisone    (-) flu COVID RSV   no leukocytosis or elevated procalcitonin  Patient has had echoes in 2019, 2021, 2023 WNL, given no signs of infection and no improvement in cough, likely sequelae of CHF   Received Lasix 20 mg IV in the ED  VBS negative for aspiration, thin liquid penetration of vocal cords   Due to uncertain etiology of cough and no improvement with current regimen, consult to pulm placed    CT chest: New small faint upper lobe predominant groundglass opacities of infectious or inflammatory etiology.  New small bilateral pleural effusions    ECHO: Mild concentric hypertrophy, EF 70%    Plan  Pulm consultation, appreciate recommendations  Diet: puree and thickened nectar liquids   Aspiration precautions   Scheduled Tessalon pearles 200 mg TID  Tussinex as needed every 12 hours    Suspected dementia with agitation (HCC)  Assessment & Plan   No official diagnosis but per granddaughter she has become more confused as of late and does appear to have episodes of sundowning.  She is quite altered this evening.  Patient was in the hospital last month and already had workup with B12 and TSH which were unrevealing.    Delirium precautions  Melatonin at bedtime  Modified diet, pureed and nectar thick liquids    Anemia  Assessment & Plan  Hemoglobin 8.3 .  Iron panel and B12 were checked on prior admission last month.  Anemia consistent with chronic  "disease, does not require repeat workup at this time    GERD (gastroesophageal reflux disease)  Assessment & Plan  Continue Protonix 40 daily  Pepcid 10 mg daily     Essential hypertension  Assessment & Plan  Continue home Lopressor 12.5 twice daily    Paroxysmal atrial fibrillation (HCC)  Assessment & Plan  Remains in sinus rhythm    Continue home Lopressor 12.5 twice daily and Eliquis 2.5 twice daily    Anxiety  Assessment & Plan  Home escitalopram 5 mg  Gabapentin 100 mg scheduled for 2 PM and 9 PM    Plan  Continue home medications     Hyperlipidemia  Assessment & Plan  Continue home Pravachol           VTE Pharmacologic Prophylaxis: VTE Score: 3 Moderate Risk (Score 3-4) - Pharmacological DVT Prophylaxis Ordered: apixaban (Eliquis).    Mobility:   Basic Mobility Inpatient Raw Score: 11  -HLM Goal: 4: Move to chair/commode  JH-HLM Achieved: 2: Bed activities/Dependent transfer  HLM Goal achieved. Continue to encourage appropriate mobility.    Patient Centered Rounds: I performed bedside rounds with nursing staff today.  Discussions with Specialists or Other Care Team Provider: Attending, speech    Education and Discussions with Family / Patient: Updated  (granddaughter) via phone.    Current Length of Stay: 2 day(s)  Current Patient Status: Inpatient   Discharge Plan: Anticipate discharge tomorrow to home with home services.    Code Status: Level 3 - DNAR and DNI    Subjective:   No acute overnight events. Patient is only oriented to self, responding \"yeah\" to each question asked by this writer. She does not appear to be in any acute distress at this time.     Objective:     Vitals:   Temp (24hrs), Av °F (37.2 °C), Min:99 °F (37.2 °C), Max:99 °F (37.2 °C)    Temp:  [99 °F (37.2 °C)] 99 °F (37.2 °C)  HR:  [77-85] 77  Resp:  [16-17] 17  BP: (113-129)/(59-67) 129/67  SpO2:  [95 %-97 %] 95 %  Body mass index is 24.14 kg/m².     Input and Output Summary (last 24 hours):     Intake/Output Summary " "(Last 24 hours) at 2/21/2024 1327  Last data filed at 2/21/2024 0900  Gross per 24 hour   Intake 120 ml   Output --   Net 120 ml       Physical Exam:   Physical Exam  Constitutional:       General: She is not in acute distress.     Appearance: She is normal weight.      Comments: Patient only oriented to self, repeating \"yeah\" to each question asked   Cardiovascular:      Rate and Rhythm: Normal rate and regular rhythm.      Heart sounds: Normal heart sounds. No murmur heard.  Pulmonary:      Effort: Pulmonary effort is normal. No respiratory distress.      Breath sounds: Normal breath sounds. No stridor. No wheezing, rhonchi or rales.   Chest:      Chest wall: No tenderness.   Abdominal:      General: There is no distension.      Palpations: Abdomen is soft.      Tenderness: There is no abdominal tenderness. There is no guarding.   Musculoskeletal:      Right lower leg: No edema.      Left lower leg: No edema.   Skin:     Coloration: Skin is not jaundiced or pale.      Findings: No bruising.   Neurological:      Mental Status: She is lethargic and disoriented.      Cranial Nerves: No cranial nerve deficit.      Motor: No weakness.          Additional Data:     Labs:  Results from last 7 days   Lab Units 02/21/24  0635 02/20/24  0900   WBC Thousand/uL 10.67* 12.51*   HEMOGLOBIN g/dL 9.7* 9.8*   HEMATOCRIT % 31.3* 31.1*   PLATELETS Thousands/uL 224 209   NEUTROS PCT %  --  79*   LYMPHS PCT %  --  9*   MONOS PCT %  --  9   EOS PCT %  --  2     Results from last 7 days   Lab Units 02/21/24  0635 02/20/24  0900 02/19/24  1935   SODIUM mmol/L 143   < > 140   POTASSIUM mmol/L 3.6   < > 3.5   CHLORIDE mmol/L 105   < > 104   CO2 mmol/L 30   < > 30   BUN mg/dL 18   < > 17   CREATININE mg/dL 0.98   < > 1.04   ANION GAP mmol/L 8   < > 6   CALCIUM mg/dL 8.9   < > 8.3*   ALBUMIN g/dL  --   --  3.0*   TOTAL BILIRUBIN mg/dL  --   --  0.61   ALK PHOS U/L  --   --  58   ALT U/L  --   --  21   AST U/L  --   --  20   GLUCOSE RANDOM " mg/dL 90   < > 151*    < > = values in this interval not displayed.                 Results from last 7 days   Lab Units 02/19/24  1935   PROCALCITONIN ng/ml 0.05       Lines/Drains:  Invasive Devices       Peripheral Intravenous Line  Duration             Peripheral IV 02/19/24 Right Antecubital 1 day                          Imaging: No pertinent imaging reviewed.    Recent Cultures (last 7 days):         Last 24 Hours Medication List:   Current Facility-Administered Medications   Medication Dose Route Frequency Provider Last Rate    apixaban  2.5 mg Oral BID Vonnie Hernández MD      benzonatate  300 mg Oral TID Candelaria Lucas DO      escitalopram  5 mg Oral Daily Candelaria Lucas DO      famotidine  10 mg Oral Daily Candelaria Lucas, DO      gabapentin  100 mg Oral BID Candelaria Lucas DO      Hydrocod Wild-Chlorphe Wild ER  5 mL Oral Q12H PRN Candelaria Lucas DO      melatonin  3 mg Oral HS Vonnie Hernández MD      menthol-methyl salicylate   Apply externally 4x Daily PRN Candelaria Lucas DO      metoprolol tartrate  12.5 mg Oral Q12H ANY Vonnie Hernández MD      pantoprazole  40 mg Oral QAM Vonnie Hernández MD      pravastatin  20 mg Oral Daily Vonnie Hernández MD      senna  1 tablet Oral Daily Vonnie Hernández MD          Today, Patient Was Seen By: Candelaria Lucas DO    **Please Note: This note may have been constructed using a voice recognition system.**

## 2024-02-21 NOTE — PLAN OF CARE
Problem: PAIN - ADULT  Goal: Verbalizes/displays adequate comfort level or baseline comfort level  Description: Interventions:  - Encourage patient to monitor pain and request assistance  - Assess pain using appropriate pain scale  - Administer analgesics based on type and severity of pain and evaluate response  - Implement non-pharmacological measures as appropriate and evaluate response  - Consider cultural and social influences on pain and pain management  - Notify physician/advanced practitioner if interventions unsuccessful or patient reports new pain  Outcome: Progressing     Problem: INFECTION - ADULT  Goal: Absence or prevention of progression during hospitalization  Description: INTERVENTIONS:  - Assess and monitor for signs and symptoms of infection  - Monitor lab/diagnostic results  - Monitor all insertion sites, i.e. indwelling lines, tubes, and drains  - Monitor endotracheal if appropriate and nasal secretions for changes in amount and color  - Pax appropriate cooling/warming therapies per order  - Administer medications as ordered  - Instruct and encourage patient and family to use good hand hygiene technique  - Identify and instruct in appropriate isolation precautions for identified infection/condition  Outcome: Progressing  Goal: Absence of fever/infection during neutropenic period  Description: INTERVENTIONS:  - Monitor WBC    Outcome: Progressing     Problem: SAFETY ADULT  Goal: Patient will remain free of falls  Description: INTERVENTIONS:  - Educate patient/family on patient safety including physical limitations  - Instruct patient to call for assistance with activity   - Consult OT/PT to assist with strengthening/mobility   - Keep Call bell within reach  - Keep bed low and locked with side rails adjusted as appropriate  - Keep care items and personal belongings within reach  - Initiate and maintain comfort rounds  - Make Fall Risk Sign visible to staff  - Offer Toileting every 2 Hours,  in advance of need  - Initiate/Maintain alarm  - Obtain necessary fall risk management equipment:   - Apply yellow socks and bracelet for high fall risk patients  - Consider moving patient to room near nurses station  Outcome: Progressing  Goal: Maintain or return to baseline ADL function  Description: INTERVENTIONS:  -  Assess patient's ability to carry out ADLs; assess patient's baseline for ADL function and identify physical deficits which impact ability to perform ADLs (bathing, care of mouth/teeth, toileting, grooming, dressing, etc.)  - Assess/evaluate cause of self-care deficits   - Assess range of motion  - Assess patient's mobility; develop plan if impaired  - Assess patient's need for assistive devices and provide as appropriate  - Encourage maximum independence but intervene and supervise when necessary  - Involve family in performance of ADLs  - Assess for home care needs following discharge   - Consider OT consult to assist with ADL evaluation and planning for discharge  - Provide patient education as appropriate  Outcome: Progressing  Goal: Maintains/Returns to pre admission functional level  Description: INTERVENTIONS:  - Perform AM-PAC 6 Click Basic Mobility/ Daily Activity assessment daily.  - Set and communicate daily mobility goal to care team and patient/family/caregiver.   - Collaborate with rehabilitation services on mobility goals if consulted  - Perform Range of Motion 2 times a day.  - Reposition patient every 2 hours.  - Dangle patient 2 times a day  - Stand patient 2 times a day  - Ambulate patient 2 times a day  - Out of bed to chair 2 times a day   - Out of bed for meals 2 times a day  - Out of bed for toileting  - Record patient progress and toleration of activity level   Outcome: Progressing     Problem: DISCHARGE PLANNING  Goal: Discharge to home or other facility with appropriate resources  Description: INTERVENTIONS:  - Identify barriers to discharge w/patient and caregiver  -  Arrange for needed discharge resources and transportation as appropriate  - Identify discharge learning needs (meds, wound care, etc.)  - Arrange for interpretive services to assist at discharge as needed  - Refer to Case Management Department for coordinating discharge planning if the patient needs post-hospital services based on physician/advanced practitioner order or complex needs related to functional status, cognitive ability, or social support system  Outcome: Progressing     Problem: Knowledge Deficit  Goal: Patient/family/caregiver demonstrates understanding of disease process, treatment plan, medications, and discharge instructions  Description: Complete learning assessment and assess knowledge base.  Interventions:  - Provide teaching at level of understanding  - Provide teaching via preferred learning methods  Outcome: Progressing

## 2024-02-21 NOTE — QUICK NOTE
"Discharge was planned for today as work-up for patient's chronic cough has been negative. Patient's granddaughter in disagreement with discharge despite repeated education from this writer. This is writer offered outpatient pulmonology consultation as patient does not have a reason to be in an acute hospital at this time. Granddaughter became frustrated that patient would need to wait several months to be seen and that patient would not have transport to appointments though this writer explained that transportation could be arranged through the office. Ivana was not amenable to this and stated \"just discharge her as you are pushing her out the door anyways.\" She also stated \"if she is discharged I will bring her right back.\" This writer offered to reach out to inpatient pulmonology team to determine if an inpatient consult was appropriate. Dariuszughter was accepting of this plan. Pulm will see the patient, formal consult placed. Discharge TBD.   "

## 2024-02-21 NOTE — DISCHARGE INSTR - AVS FIRST PAGE
Dear Grace Clayton,     It was our pleasure to care for you here at [facility].  It is our hope that we were always able to exceed the expected standards for your care during your stay.  You were hospitalized due to chronic cough.  You were cared for on the south 3rd floor by Candelaria Lucas DO under the service of Nacho Jorgensen MD with the [facility] who covers for your primary care physician (PCP), No primary care provider on file., while you were hospitalized.  If you have any questions or concerns related to this hospitalization, you may contact us at .  For follow up as well as any medication refills, we recommend that you follow up with your primary care physician.  A registered nurse will reach out to you by phone within a few days after your discharge to answer any additional questions that you may have after going home.  However, at this time we provide for you here, the most important instructions / recommendations at discharge:     Notable Medication Adjustments -   Please START taking Pepcid 10 mg daily for acid reflux   Please START using albuterol nebulizer solution 4 times daily   A home nebulizer will be sent to your house within the next few days, do not share nebulizers between others in the household  Please START budesonide twice daily     Testing Required after Discharge -   No further testing required after discharge     Important follow up information -   Please follow-up with your PCP within 2 weeks of discharge to discuss your recent hospitalization   Please follow-up with pulmonology within 2 weeks of discharge    Other Instructions -   Please ensure that when you are eating that you are sitting up completely  Per speech who evaluated you during this stay, please ensure that you are drinking nectar thick liquids and eating a pureed diet    Please review this entire after visit summary as additional general instructions  including medication list, appointments, activity, diet, any pertinent wound care, and other additional recommendations from your care team that may be provided for you.      Sincerely,     Candelaria Lucas, DO

## 2024-02-21 NOTE — PLAN OF CARE
VBS completed, only trace amt of penetration VC with thin liquids by straw.   Rec cont puree diet, advance to thin liquids w/ aspiration precautions  Will cont to follow.

## 2024-02-21 NOTE — PROCEDURES
Video Swallow Study      Patient Name: Grace Clayton  Today's Date: 2/21/2024        Past Medical History  No past medical history on file.     Past Surgical History  No past surgical history on file.      General Information:   89 yo female referred  for a VBS by Dr. Jorgensen for dysphagia w/  c/o chronic cough, rule out aspiration due to delayed cough. See bedside swallow eval for further info    Cognition:  alert, few verbal responses, inconsistently follows commands    Speech/Swallow Mech: Oral motor movements appeared  WFL; Dentition was  edentulous; Cough was weak. Respiratory Status: RA;   Current diet: puree w/ nectar thick liquids.  Prior VBS none    Pt was seen in radiology for a Video Barium Swallow Study, seated in the upright position and viewed laterally with the following consistencies: puree, nectar thick liquids, thin liquids, attempted mech solid but pt stated it was too hard and spit out.       Results are as follows:     **Images are available for review on PACS        Oral Stage:   Pt able to drink from cup and straw, took smaller amts from cup. Pt took 1/4 pc of shortbread cookie, attempted to masticate but stated it was too hard and spit out. Pt appeared w/ good oral control of liquids from cup and straw. Oral residue noted.              Pharyngeal Stage:   Swallow initiation was timely w/ decreased hyolaryngeal excursion and epiglottic inversion, and mildly decreased airway entrance closure. High transient penetration observed with nectar thick liquids by straw; penetration to VC with thin liquids by straw, small amt noted through glottis, throat clear and cough noted x1 during study w/ penetration episode of thin liquids. Cough not strong enough to clear penetrated liquids. Vocal quality was noted to be wet. Multiple swallows noted due to large sips and oral residue.                   Esophageal Stage:   Briefly assessed, no overt abnormality. Only small amt  of po taken, all materials cleared the esophagus.        Assessment Summary:    Mild oral/pharyngeal dysphagia w/ limited consistencies (puree and liquids) characterized by delayed oral processing/transfer, decreased hyolaryngeal excursion, minimal epiglottic inversion and decreased airway entrance closure w/ trace penetration with thin liquids to VC.               Recommendations:   Cont puree diet, advance to thin liquids w/ aspiration precautions  Meds crushed  Liquids by straw      Above results and recs discussed with Dr. Lucas and pt's granddtr.       Ofe Gipson MA CCC-SLP  Speech Pathologist  [place] license # [id number]  [place] license # [id number]  Available via [employer]

## 2024-02-21 NOTE — CASE MANAGEMENT
Case Management Discharge Planning Note    Patient name Grace García S /S -01 MRN [id number]  : 1925 Date 2024       Current Admission Date: 2024  Current Admission Diagnosis:Post-infectious Bronchiolitis   Patient Active Problem List    Diagnosis Date Noted    Post-infectious Bronchiolitis 2024    Anemia 01/15/2024    Suspected dementia with agitation (HCC) 2024    RSV infection with wrosening cough 2024    GERD (gastroesophageal reflux disease) 2024    Non-MI troponin elevation 2024    Generalized weakness 2023    Paroxysmal atrial fibrillation (HCC) 2023    Ambulatory dysfunction 2023    Anxiety 2023    Hyperlipidemia 2012    Essential hypertension 2012      LOS (days): 2  Geometric Mean LOS (GMLOS) (days): 2.3  Days to GMLOS:0.6     OBJECTIVE:  Risk of Unplanned Readmission Score: 16.57         Current admission status: Inpatient   Preferred Pharmacy:   [id number] [address] [address]  Phone: [phone] Fax: [phone]    Primary Care Provider: No primary care provider on file.    Primary Insurance: MEDICARE  Secondary Insurance: [employer]    DISCHARGE DETAILS:    Discharge planning discussed with:: patient's granddaughter Molly  Freedom of Choice: Yes  Comments - Freedom of Choice: CM f/u w/ pt's granddaughter Molly via phone re: pt need for nebulizer. [facility] choice for [facility] provider for nebulizer to be sent home w/ patient. Nebulizer ordered through [facility] via [facility]ute - nebulizer delivered to pt bedside from consignment closet.  CM contacted family/caregiver?: Yes             Contacts  Patient Contacts: Molly (ddtr)  Relationship to Patient:: Family  Contact Method: Phone  Phone Number: [phone]  Reason/Outcome: Continuity of Care, Discharge Planning, Referral, Emergency  Contact         DME Referral Provided  Referral made for DME?: Yes  DME referral completed for the following items:: Nebulizer  DME Supplier Name:: [employer]    Other Referral/Resources/Interventions Provided:  Interventions: Transportation  Referral Comments: Transport referral placed to [facility] for 1845 via [facility] to home today. All parties notified of same.         Treatment Team Recommendation: Home with Home Health Care  Discharge Destination Plan:: Home with Home Health Care  Transport at Discharge : [facility]        Transported by (Company and Unit #): [facility]  ETA of Transport (Date): 02/21/24  ETA of Transport (Time): 1845           IMM reviewed with patient's caregiver, patient's caregiver agrees with discharge determination.  IMM Given (Date):: 02/21/24  IMM Given to:: Family  Family notified:: Molly (granddaughter)

## 2024-02-21 NOTE — ASSESSMENT & PLAN NOTE
Patient has now had a cough for over 2 months according to granddaughter.    At times productive, but other times appears to not be clearing sputum effectively   Admitted for RSV a little over a month ago as well as pneumonia, treated with IV Rocephin and transitioned to cefdinir as well as 9 days of prednisone    (-) flu COVID RSV   no leukocytosis or elevated procalcitonin  Patient has had echoes in 2019, 2021, 2023 WNL, given no signs of infection and no improvement in cough, likely sequelae of CHF   Received Lasix 20 mg IV in the ED  VBS negative for aspiration, thin liquid penetration of vocal cords   Per pulm, patient symptoms likely due to postinfectious bronchiolitis     CT chest: New small faint upper lobe predominant groundglass opacities of infectious or inflammatory etiology.  New small bilateral pleural effusions    ECHO: Mild concentric hypertrophy, EF 70%    Plan  Albuterol nebulizer four times daily   Budesonide 0.5 mg BID   Diet: puree and thickened nectar liquids   Aspiration precaution explained to family  Pulm to arrange virtual follow-up in the next several weeks

## 2024-02-21 NOTE — CONSULTS
"Pulmonary Consultation   Grace Clayton 89 y.o. female MRN: [id number]  Unit/Bed#: S -01 Encounter: [id number]      Reason for consultation: Chronic cough    Requesting physician:     Candelaria Lucas DO       Impressions/Recommendations:     Subacute cough, most likely postinfectious bronchiolitis related to RSV infection in January 2024-patient has completed antibiotics and systemic steroids, but continues to struggle with symptoms.  I do not think she can coordinate inhalers.  In addition to albuterol via nebulizer, will add budesonide twice daily.  She will also need her own nebulizer, as she has been using a family members.  Will make arrangements for a virtual visit with us in a few weeks.    I communicated my recommendation to primary team and granddaughter    Chief Complaint: \"COUGH\"    History of Present Illness   HPI:  Grace Clyaton is a 89 y.o. female who was admitted on 2/20/2024 with persistent cough.  Patient is a poor historian due to dementia.  All history is obtained from patient's granddaughter Molly.  She has no pre-existing lung disease including asthma or emphysema.  She is a lifelong non-smoker.  In December of this year, she was exposed to a family member with RSV and she tested positive shortly thereafter.  She was briefly hospitalized and placed on a course of IV antibiotics.  She was prescribed cough medications and discharged home.  She continued to have cough and was evaluated by primary care on 2/7/2024.  At that time she was prescribed an additional course of systemic steroids and antibiotics.  On 2/15/2024, she was given albuterol for the nebulizer but the cough persisted.  She was then admitted yesterday due to concern for persistent cough.  The granddaughter feels that it is making the patient very weak.  She describes the cough as \"deep\".    Review of systems:   Review of Systems   Unable to perform ROS: Dementia     Historical Information   Past Medical History:   Diagnosis Date    " "Atrial fibrillation (HCC)     Dementia (HCC)     Hypertension      Surgical and Family history noncontributory    Tobacco history: Lifelong non-smoker    Meds/Allergies   Current Facility-Administered Medications   Medication Dose Route Frequency    apixaban (ELIQUIS) tablet 2.5 mg  2.5 mg Oral BID    benzonatate (TESSALON PERLES) capsule 200 mg  200 mg Oral TID    escitalopram (LEXAPRO) tablet 5 mg  5 mg Oral Daily    famotidine (PEPCID) tablet 10 mg  10 mg Oral Daily    gabapentin (NEURONTIN) capsule 100 mg  100 mg Oral BID    Hydrocod Wild-Chlorphe Wild ER (TUSSIONEX) ER suspension 5 mL  5 mL Oral Q12H PRN    melatonin tablet 3 mg  3 mg Oral HS    menthol-methyl salicylate (BENGAY) 10-15 % cream   Apply externally 4x Daily PRN    metoprolol tartrate (LOPRESSOR) partial tablet 12.5 mg  12.5 mg Oral Q12H ANY    pantoprazole (PROTONIX) EC tablet 40 mg  40 mg Oral QAM    pravastatin (PRAVACHOL) tablet 20 mg  20 mg Oral Daily    senna (SENOKOT) tablet 8.6 mg  1 tablet Oral Daily     Allergies   Allergen Reactions    Penicillins Hives       Vitals: Blood pressure 129/67, pulse 77, temperature 99 °F (37.2 °C), resp. rate 17, height 5' 2\" (1.575 m), weight 59.9 kg (132 lb), SpO2 95%.,  Room air, Body mass index is 24.14 kg/m².    Intake/Output Summary (Last 24 hours) at 2/21/2024 1506  Last data filed at 2/21/2024 1436  Gross per 24 hour   Intake 170 ml   Output --   Net 170 ml       Physical exam:     Physical Exam  Constitutional:       General: She is not in acute distress.     Appearance: Normal appearance.   HENT:      Head: Normocephalic.      Mouth/Throat:      Mouth: Mucous membranes are dry.      Pharynx: No oropharyngeal exudate.   Eyes:      General: No scleral icterus.  Neck:      Vascular: No JVD.   Cardiovascular:      Rate and Rhythm: Normal rate and regular rhythm.   Pulmonary:      Breath sounds: No wheezing, rhonchi or rales.   Abdominal:      Palpations: Abdomen is soft.      Tenderness: There is no " abdominal tenderness.   Musculoskeletal:         General: No swelling or deformity.      Cervical back: Neck supple.   Lymphadenopathy:      Cervical: No cervical adenopathy.   Skin:     General: Skin is warm and dry.      Findings: Bruising present.   Neurological:      Mental Status: She is alert.         Labs: I have personally reviewed pertinent lab results.    Results from last 7 days   Lab Units 02/21/24  0635 02/20/24  0900 02/19/24  1935   WBC Thousand/uL 10.67* 12.51* 9.36   HEMOGLOBIN g/dL 9.7* 9.8* 8.3*   HEMATOCRIT % 31.3* 31.1* 27.1*   PLATELETS Thousands/uL 224 209 182         Results from last 7 days   Lab Units 02/21/24  0635 02/20/24  0900 02/19/24  1935   POTASSIUM mmol/L 3.6 3.3* 3.5   CHLORIDE mmol/L 105 102 104   CO2 mmol/L 30 31 30   BUN mg/dL 18 16 17   CREATININE mg/dL 0.98 1.00 1.04   CALCIUM mg/dL 8.9 8.5 8.3*   ALK PHOS U/L  --   --  58   ALT U/L  --   --  21   AST U/L  --   --  20     Procalcitonin 0.05  NT-    Imaging and other studies: I have personally reviewed pertinent reports.   and I have personally reviewed pertinent films in PACS CT of the chest from 2/19/2024 shows small bilateral pleural effusions and very faint upper lobe groundglass opacities    Other Studies: I have personally reviewed pertinent reports.   Echocardiogram performed on 2/20/2024 shows EF of 70%, PA pressure 60 mmHg    Code Status: Level 3 - DNAR and DNI    Thank you for allowing us to participate in the care of your patient.    Caity Johnson,

## 2024-02-27 LAB
DME PARACHUTE DELIVERY DATE ACTUAL: NORMAL
DME PARACHUTE DELIVERY DATE REQUESTED: NORMAL
DME PARACHUTE ITEM DESCRIPTION: NORMAL
DME PARACHUTE ORDER STATUS: NORMAL
DME PARACHUTE SUPPLIER NAME: NORMAL
DME PARACHUTE SUPPLIER PHONE: NORMAL

## 2024-10-26 ENCOUNTER — HOSPITAL ENCOUNTER (INPATIENT)
Facility: HOSPITAL | Age: 89
LOS: 5 days | Discharge: HOME HEALTH | DRG: 853 | End: 2024-10-31
Attending: EMERGENCY MEDICINE | Admitting: INTERNAL MEDICINE
Payer: MEDICARE

## 2024-10-26 ENCOUNTER — APPOINTMENT (INPATIENT)
Dept: CT IMAGING | Facility: HOSPITAL | Age: 89
DRG: 853 | End: 2024-10-26
Payer: MEDICARE

## 2024-10-26 ENCOUNTER — APPOINTMENT (EMERGENCY)
Dept: RADIOLOGY | Facility: HOSPITAL | Age: 89
DRG: 853 | End: 2024-10-26
Payer: MEDICARE

## 2024-10-26 DIAGNOSIS — L89.159 PRESSURE INJURY OF SKIN OF SACRAL REGION, UNSPECIFIED INJURY STAGE: ICD-10-CM

## 2024-10-26 DIAGNOSIS — F03.90 DEMENTIA (HCC): ICD-10-CM

## 2024-10-26 DIAGNOSIS — I48.20 CHRONIC ATRIAL FIBRILLATION (HCC): ICD-10-CM

## 2024-10-26 DIAGNOSIS — T83.511A URINARY TRACT INFECTION ASSOCIATED WITH INDWELLING URETHRAL CATHETER, INITIAL ENCOUNTER  (HCC): Primary | ICD-10-CM

## 2024-10-26 DIAGNOSIS — L89.90 PRESSURE ULCERS OF SKIN OF MULTIPLE TOPOGRAPHIC SITES: ICD-10-CM

## 2024-10-26 DIAGNOSIS — N39.0 URINARY TRACT INFECTION ASSOCIATED WITH INDWELLING URETHRAL CATHETER, INITIAL ENCOUNTER  (HCC): Primary | ICD-10-CM

## 2024-10-26 DIAGNOSIS — I48.91 A-FIB (HCC): ICD-10-CM

## 2024-10-26 DIAGNOSIS — R62.7 FAILURE TO THRIVE IN ADULT: ICD-10-CM

## 2024-10-26 DIAGNOSIS — R10.84 GENERALIZED ABDOMINAL PAIN: ICD-10-CM

## 2024-10-26 LAB
2HR DELTA HS TROPONIN: -8 NG/L
ALBUMIN SERPL BCG-MCNC: 2.6 G/DL (ref 3.5–5)
ALP SERPL-CCNC: 71 U/L (ref 34–104)
ALT SERPL W P-5'-P-CCNC: 5 U/L (ref 7–52)
ANION GAP SERPL CALCULATED.3IONS-SCNC: 16 MMOL/L (ref 4–13)
AST SERPL W P-5'-P-CCNC: 27 U/L (ref 13–39)
BACTERIA UR QL AUTO: ABNORMAL /HPF
BASOPHILS # BLD AUTO: 0.08 THOUSANDS/ΜL (ref 0–0.1)
BASOPHILS NFR BLD AUTO: 0 % (ref 0–1)
BILIRUB SERPL-MCNC: 0.84 MG/DL (ref 0.2–1)
BILIRUB UR QL STRIP: NEGATIVE
BUN SERPL-MCNC: 19 MG/DL (ref 5–25)
CALCIUM ALBUM COR SERPL-MCNC: 9.5 MG/DL (ref 8.3–10.1)
CALCIUM SERPL-MCNC: 8.4 MG/DL (ref 8.4–10.2)
CARDIAC TROPONIN I PNL SERPL HS: 101 NG/L
CARDIAC TROPONIN I PNL SERPL HS: 109 NG/L
CHLORIDE SERPL-SCNC: 100 MMOL/L (ref 96–108)
CLARITY UR: ABNORMAL
CO2 SERPL-SCNC: 26 MMOL/L (ref 21–32)
COLOR UR: YELLOW
CREAT SERPL-MCNC: 0.6 MG/DL (ref 0.6–1.3)
EOSINOPHIL # BLD AUTO: 0.05 THOUSAND/ΜL (ref 0–0.61)
EOSINOPHIL NFR BLD AUTO: 0 % (ref 0–6)
ERYTHROCYTE [DISTWIDTH] IN BLOOD BY AUTOMATED COUNT: 18.6 % (ref 11.6–15.1)
GFR SERPL CREATININE-BSD FRML MDRD: 75 ML/MIN/1.73SQ M
GLUCOSE SERPL-MCNC: 176 MG/DL (ref 65–140)
GLUCOSE UR STRIP-MCNC: ABNORMAL MG/DL
HCT VFR BLD AUTO: 32.4 % (ref 34.8–46.1)
HGB BLD-MCNC: 9.7 G/DL (ref 11.5–15.4)
HGB UR QL STRIP.AUTO: ABNORMAL
IMM GRANULOCYTES # BLD AUTO: 0.16 THOUSAND/UL (ref 0–0.2)
IMM GRANULOCYTES NFR BLD AUTO: 1 % (ref 0–2)
KETONES UR STRIP-MCNC: ABNORMAL MG/DL
LACTATE SERPL-SCNC: 2.8 MMOL/L (ref 0.5–2)
LEUKOCYTE ESTERASE UR QL STRIP: ABNORMAL
LYMPHOCYTES # BLD AUTO: 0.99 THOUSANDS/ΜL (ref 0.6–4.47)
LYMPHOCYTES NFR BLD AUTO: 5 % (ref 14–44)
MAGNESIUM SERPL-MCNC: 2 MG/DL (ref 1.9–2.7)
MCH RBC QN AUTO: 29.7 PG (ref 26.8–34.3)
MCHC RBC AUTO-ENTMCNC: 29.9 G/DL (ref 31.4–37.4)
MCV RBC AUTO: 99 FL (ref 82–98)
MONOCYTES # BLD AUTO: 1 THOUSAND/ΜL (ref 0.17–1.22)
MONOCYTES NFR BLD AUTO: 5 % (ref 4–12)
MUCOUS THREADS UR QL AUTO: ABNORMAL
NEUTROPHILS # BLD AUTO: 18.95 THOUSANDS/ΜL (ref 1.85–7.62)
NEUTS SEG NFR BLD AUTO: 89 % (ref 43–75)
NITRITE UR QL STRIP: POSITIVE
NON-SQ EPI CELLS URNS QL MICRO: ABNORMAL /HPF
NRBC BLD AUTO-RTO: 0 /100 WBCS
PH UR STRIP.AUTO: 5.5 [PH]
PHOSPHATE SERPL-MCNC: 2.6 MG/DL (ref 2.3–4.1)
PLATELET # BLD AUTO: 455 THOUSANDS/UL (ref 149–390)
PMV BLD AUTO: 9.7 FL (ref 8.9–12.7)
POTASSIUM SERPL-SCNC: 3.5 MMOL/L (ref 3.5–5.3)
PROT SERPL-MCNC: 6 G/DL (ref 6.4–8.4)
PROT UR STRIP-MCNC: ABNORMAL MG/DL
RBC # BLD AUTO: 3.27 MILLION/UL (ref 3.81–5.12)
RBC #/AREA URNS AUTO: ABNORMAL /HPF
SODIUM SERPL-SCNC: 142 MMOL/L (ref 135–147)
SP GR UR STRIP.AUTO: 1.03 (ref 1–1.03)
UROBILINOGEN UR STRIP-ACNC: <2 MG/DL
WBC # BLD AUTO: 21.23 THOUSAND/UL (ref 4.31–10.16)
WBC #/AREA URNS AUTO: ABNORMAL /HPF
WBC CLUMPS # UR AUTO: PRESENT /UL

## 2024-10-26 PROCEDURE — 84484 ASSAY OF TROPONIN QUANT: CPT

## 2024-10-26 PROCEDURE — 71250 CT THORAX DX C-: CPT

## 2024-10-26 PROCEDURE — 84443 ASSAY THYROID STIM HORMONE: CPT

## 2024-10-26 PROCEDURE — 99291 CRITICAL CARE FIRST HOUR: CPT | Performed by: EMERGENCY MEDICINE

## 2024-10-26 PROCEDURE — 83605 ASSAY OF LACTIC ACID: CPT

## 2024-10-26 PROCEDURE — 71045 X-RAY EXAM CHEST 1 VIEW: CPT

## 2024-10-26 PROCEDURE — 84100 ASSAY OF PHOSPHORUS: CPT

## 2024-10-26 PROCEDURE — 93005 ELECTROCARDIOGRAM TRACING: CPT

## 2024-10-26 PROCEDURE — 96365 THER/PROPH/DIAG IV INF INIT: CPT

## 2024-10-26 PROCEDURE — 96367 TX/PROPH/DG ADDL SEQ IV INF: CPT

## 2024-10-26 PROCEDURE — 74176 CT ABD & PELVIS W/O CONTRAST: CPT

## 2024-10-26 PROCEDURE — 85025 COMPLETE CBC W/AUTO DIFF WBC: CPT

## 2024-10-26 PROCEDURE — 87086 URINE CULTURE/COLONY COUNT: CPT

## 2024-10-26 PROCEDURE — 36415 COLL VENOUS BLD VENIPUNCTURE: CPT

## 2024-10-26 PROCEDURE — 87040 BLOOD CULTURE FOR BACTERIA: CPT

## 2024-10-26 PROCEDURE — 99285 EMERGENCY DEPT VISIT HI MDM: CPT

## 2024-10-26 PROCEDURE — 83880 ASSAY OF NATRIURETIC PEPTIDE: CPT

## 2024-10-26 PROCEDURE — 81001 URINALYSIS AUTO W/SCOPE: CPT

## 2024-10-26 PROCEDURE — 96366 THER/PROPH/DIAG IV INF ADDON: CPT

## 2024-10-26 PROCEDURE — 83735 ASSAY OF MAGNESIUM: CPT

## 2024-10-26 PROCEDURE — 80053 COMPREHEN METABOLIC PANEL: CPT

## 2024-10-26 PROCEDURE — 96375 TX/PRO/DX INJ NEW DRUG ADDON: CPT

## 2024-10-26 RX ORDER — HYDROMORPHONE HCL IN WATER/PF 6 MG/30 ML
0.2 PATIENT CONTROLLED ANALGESIA SYRINGE INTRAVENOUS ONCE
Status: COMPLETED | OUTPATIENT
Start: 2024-10-26 | End: 2024-10-26

## 2024-10-26 RX ORDER — SODIUM CHLORIDE, SODIUM GLUCONATE, SODIUM ACETATE, POTASSIUM CHLORIDE, MAGNESIUM CHLORIDE, SODIUM PHOSPHATE, DIBASIC, AND POTASSIUM PHOSPHATE .53; .5; .37; .037; .03; .012; .00082 G/100ML; G/100ML; G/100ML; G/100ML; G/100ML; G/100ML; G/100ML
500 INJECTION, SOLUTION INTRAVENOUS ONCE
Status: COMPLETED | OUTPATIENT
Start: 2024-10-26 | End: 2024-10-26

## 2024-10-26 RX ORDER — ACETAMINOPHEN 10 MG/ML
1000 INJECTION, SOLUTION INTRAVENOUS ONCE
Status: COMPLETED | OUTPATIENT
Start: 2024-10-26 | End: 2024-10-26

## 2024-10-26 RX ORDER — METOPROLOL TARTRATE 1 MG/ML
5 INJECTION, SOLUTION INTRAVENOUS ONCE
Status: COMPLETED | OUTPATIENT
Start: 2024-10-26 | End: 2024-10-26

## 2024-10-26 RX ADMIN — HYDROMORPHONE HYDROCHLORIDE 0.2 MG: 0.2 INJECTION, SOLUTION INTRAMUSCULAR; INTRAVENOUS; SUBCUTANEOUS at 19:25

## 2024-10-26 RX ADMIN — SODIUM CHLORIDE, SODIUM GLUCONATE, SODIUM ACETATE, POTASSIUM CHLORIDE, MAGNESIUM CHLORIDE, SODIUM PHOSPHATE, DIBASIC, AND POTASSIUM PHOSPHATE 500 ML: .53; .5; .37; .037; .03; .012; .00082 INJECTION, SOLUTION INTRAVENOUS at 21:43

## 2024-10-26 RX ADMIN — CEFEPIME 2000 MG: 2 INJECTION, POWDER, FOR SOLUTION INTRAVENOUS at 21:10

## 2024-10-26 RX ADMIN — SODIUM CHLORIDE, SODIUM GLUCONATE, SODIUM ACETATE, POTASSIUM CHLORIDE, MAGNESIUM CHLORIDE, SODIUM PHOSPHATE, DIBASIC, AND POTASSIUM PHOSPHATE 500 ML: .53; .5; .37; .037; .03; .012; .00082 INJECTION, SOLUTION INTRAVENOUS at 19:59

## 2024-10-26 RX ADMIN — SODIUM CHLORIDE, SODIUM GLUCONATE, SODIUM ACETATE, POTASSIUM CHLORIDE, MAGNESIUM CHLORIDE, SODIUM PHOSPHATE, DIBASIC, AND POTASSIUM PHOSPHATE 500 ML: .53; .5; .37; .037; .03; .012; .00082 INJECTION, SOLUTION INTRAVENOUS at 19:24

## 2024-10-26 RX ADMIN — METOROPROLOL TARTRATE 5 MG: 5 INJECTION, SOLUTION INTRAVENOUS at 21:43

## 2024-10-26 RX ADMIN — ACETAMINOPHEN 1000 MG: 10 INJECTION INTRAVENOUS at 19:25

## 2024-10-26 NOTE — ED PROVIDER NOTES
"Time reflects when diagnosis was documented in both MDM as applicable and the Disposition within this note       Time User Action Codes Description Comment    10/26/2024 10:48 PM Kash Valladares [I48.20] Chronic atrial fibrillation ([facility])     10/26/2024 10:48 PM Kash Valladares [R10.84] Generalized abdominal pain     10/26/2024 10:48 PM Kash Valladares [L89.159] Pressure injury of skin of sacral region, unspecified injury stage     10/26/2024 10:49 PM Kash Valladares [T83.511A,  N39.0] Urinary tract infection associated with indwelling urethral catheter, initial encounter  ([facility])     10/26/2024 10:49 PM Kash Valladares [I48.20] Chronic atrial fibrillation ([facility])     10/26/2024 10:49 PM Kash Valladares [T83.511A,  N39.0] Urinary tract infection associated with indwelling urethral catheter, initial encounter  ([facility])     10/26/2024 11:07 PM Raymond Singh [R62.7] Failure to thrive in adult           ED Disposition       ED Disposition   Admit    Condition   Stable    Date/Time   Sat Oct 26, 2024 10:48 PM    Comment   Case was discussed with Dr. Singh and the patient's admission status was agreed to be Admission Status: inpatient status to the service of Dr. Andrews .               Assessment & Plan       Medical Decision Making  Amount and/or Complexity of Data Reviewed  Labs: ordered.  Radiology: ordered.    Risk  Prescription drug management.    \"   Chief Complaint   Patient presents with    Rapid Heart Rate     Pt coming from home, granddaughter cares for her. At home nurse noticed heart rate high and oxygen low. Pt is confused at baseline.        Initial ED Assessment: 89 yo Female with PMH of atrial fibrillation, dementia and hypertension who has been suffering from increased fatigue, AMS and decreased PO intake for the past 2 weeks since discharge from SNF.  Initial patient presentation was concerning due to toxic appearance.    Differential dx includes but is not limited to UTI, ACS, MI, " "metabolic derangement, pneumonia, dehydration, gastritis, constipation.    Initial ED Plan and results: Troponin, UA, CMP, mag, Phos, lactic, CBC, chest x-ray, EKG    \"Patient's labs notable for: UA showing florid infection.  Severe leukocytosis, anemia.  Troponin elevation with -8 delta.\", \"Imaging revealed: Bilateral pulmonary infiltrates.\", and \"EKG: Atrial fibrillation with nonspecific T wave changes.  interpreted by myself as above.\"     Patient has florid UTI infection due to chronic indwelling catheter.  Labs also concerning for symptomatic dehydration.  1.5 L Isolyte given in the place of sepsis bolus due to concern for volume overload.  Broad-spectrum antibiotics including ceftriaxone was initiated for UTI coverage.    \"Discussed patient's case with Dr. Andrews (Hospitalist) regarding admission who accepted the patient for further evaluation and management.\"         Medications   multi-electrolyte (ISOLYTE-S PH 7.4) bolus 500 mL (0 mL Intravenous Stopped 10/26/24 2024)   acetaminophen (Ofirmev) injection 1,000 mg (0 mg Intravenous Stopped 10/26/24 1940)   HYDROmorphone HCl (DILAUDID) injection 0.2 mg (0.2 mg Intravenous Given 10/26/24 1925)   multi-electrolyte (ISOLYTE-S PH 7.4) bolus 500 mL (0 mL Intravenous Stopped 10/26/24 2059)   cefepime (MAXIPIME) 2 g/50 mL dextrose IVPB (0 mg Intravenous Stopped 10/26/24 2140)   metoprolol (LOPRESSOR) injection 5 mg (5 mg Intravenous Given 10/26/24 2143)   multi-electrolyte (ISOLYTE-S PH 7.4) bolus 500 mL (0 mL Intravenous Stopped 10/26/24 2243)       ED Risk Strat Scores                                               History of Present Illness       Chief Complaint   Patient presents with    Rapid Heart Rate     Pt coming from home, granddaughter cares for her. At home nurse noticed heart rate high and oxygen low. Pt is confused at baseline.        Past Medical History:   Diagnosis Date    Atrial fibrillation (HCC)     Dementia (HCC)     Hypertension       History " reviewed. No pertinent surgical history.   History reviewed. No pertinent family history.   Social History     Tobacco Use    Smoking status: Never    Smokeless tobacco: Never   Substance Use Topics    Alcohol use: Never    Drug use: Never      E-Cigarette/Vaping      E-Cigarette/Vaping Substances      I have reviewed and agree with the history as documented.     HPI  89 yo Female with PMH of atrial fibrillation, dementia and hypertension who has been suffering from increased fatigue, AMS and decreased PO intake for the past 2 weeks since discharge from SNF. She has not been on Metoprolol, Eliquis or any of her other home medications due to confusion/misunderstanding with her primary care provider.  She states that she has been suffering from AMS for the last 2 days compared to her baseline.  Had a long conversation with her granddaughter who is her POA and she does not want surgeries or PEG. She wants all non-invasive treatments up to BIPAP.     Review of Systems   Reason unable to perform ROS: ROS was difficult due to dementia.   Constitutional:  Positive for fatigue and fever. Negative for chills.   HENT:  Negative for ear pain, rhinorrhea and sore throat.    Cardiovascular:  Negative for leg swelling.   Gastrointestinal:  Positive for abdominal pain. Negative for blood in stool and vomiting.   Genitourinary:  Negative for dysuria and hematuria.   Musculoskeletal:         Sacral decubitus ulcer.   Neurological:  Negative for seizures and syncope.   Psychiatric/Behavioral:  Positive for agitation and confusion.    All other systems reviewed and are negative.          Objective       ED Triage Vitals   Temperature Pulse Blood Pressure Respirations SpO2 Patient Position - Orthostatic VS   10/26/24 1838 10/26/24 1835 10/26/24 1835 10/26/24 1835 10/26/24 1835 --   100.1 °F (37.8 °C) (!) 159 163/81 18 95 %       Temp Source Heart Rate Source BP Location FiO2 (%) Pain Score    10/26/24 1838 10/26/24 1835 10/26/24 1835  -- 10/26/24 2300    Axillary Monitor Right arm  No Pain      Vitals      Date and Time Temp Pulse SpO2 Resp BP Pain Score FACES Pain Rating User   10/26/24 2300 99.3 °F (37.4 °C) 101 94 % -- 149/71 No Pain -- MM   10/26/24 2230 99.1 °F (37.3 °C) 103 93 % -- 121/72 -- -- NP   10/26/24 2145 99.7 °F (37.6 °C) 145 92 % -- 138/78 -- -- MM   10/26/24 2130 99.5 °F (37.5 °C) 133 95 % -- 124/76 -- -- MM   10/26/24 2115 98.6 °F (37 °C) 148 92 % 18 152/85 -- -- MM   10/26/24 1930 -- 151 95 % -- 147/76 -- -- NP   10/26/24 1906 100 °F (37.8 °C) -- -- -- -- -- -- LD   10/26/24 1838 100.1 °F (37.8 °C) -- -- -- -- -- -- LD   10/26/24 1835 -- 159 95 % 18 163/81 -- -- LD            Physical Exam  GENERAL APPEARANCE:  AxOx1, generally toxic-appearing female, moderate distress.  HEENT:  NC, AT.  Dry mucous membranes. EOMI, clear conjunctiva, oropharynx clear.  NECK:  Supple without lymphadenopathy.  No stiffness or restricted ROM.  HEART:  Normal S1/S2, no m/r/g.  Tachycardic with irregularly irregular rhythm.  LUNGS:  CTAB, moving air well. No crackles or wheezes are heard.  ABDOMEN:  Soft, nondistended.  Diffuse tenderness throughout the abdomen.  BACK: No obvious deformity.  EXTREMITIES:  Without cyanosis, clubbing or edema.  NEUROLOGICAL:  Grossly nonfocal.  Moving all 4 extremities. CN not formally tested but appear grossly intact.  Skin:  Warm and dry.  Large 10 x 10 cm sacral decubitus ulcer penetrating into subcutaneous tissue.    Results Reviewed       Procedure Component Value Units Date/Time    Procalcitonin [[id number]] Collected: 10/26/24 1907    Lab Status: In process Specimen: Blood from Arm, Right Updated: 10/26/24 2314    MRSA culture [[id number]]     Lab Status: No result Specimen: Nares     B-Type Natriuretic Peptide(BNP) [[id number]]     Lab Status: No result Specimen: Blood     TSH, 3rd generation with Free T4 reflex [[id number]] Collected: 10/26/24 1907    Lab Status: In process Specimen: Blood from Arm, Right  Updated: 10/26/24 2253    Urine Microscopic [[id number]]  (Abnormal) Collected: 10/26/24 2233    Lab Status: Final result Specimen: Urine, Indwelling Chung Catheter Updated: 10/26/24 2241     RBC, UA 30-50 /hpf      WBC, UA Innumerable /hpf      Epithelial Cells Occasional /hpf      Bacteria, UA Moderate /hpf      MUCUS THREADS Occasional     WBC Clumps Present    Urine culture [[id number]] Collected: 10/26/24 2233    Lab Status: In process Specimen: Urine, Indwelling Chung Catheter Updated: 10/26/24 2241    UA w Reflex to Microscopic w Reflex to Culture [[id number]]  (Abnormal) Collected: 10/26/24 2233    Lab Status: Final result Specimen: Urine, Indwelling Chung Catheter Updated: 10/26/24 2239     Color, UA Yellow     Clarity, UA Extra Turbid     Specific Gravity, UA 1.026     pH, UA 5.5     Leukocytes, UA Large     Nitrite, UA Positive     Protein, UA 50 (1+) mg/dl      Glucose, UA Trace mg/dl      Ketones, UA 40 (2+) mg/dl      Urobilinogen, UA <2.0 mg/dl      Bilirubin, UA Negative     Occult Blood, UA Large    HS Troponin I 2hr [[id number]]  (Abnormal) Collected: 10/26/24 2112    Lab Status: Final result Specimen: Blood from Arm, Right Updated: 10/26/24 2147     hs TnI 2hr 101 ng/L      Delta 2hr hsTnI -8 ng/L     CBC and differential [[id number]]  (Abnormal) Collected: 10/26/24 1907    Lab Status: Final result Specimen: Blood from Arm, Right Updated: 10/26/24 2011     WBC 21.23 Thousand/uL      RBC 3.27 Million/uL      Hemoglobin 9.7 g/dL      Hematocrit 32.4 %      MCV 99 fL      MCH 29.7 pg      MCHC 29.9 g/dL      RDW 18.6 %      MPV 9.7 fL      Platelets 455 Thousands/uL      nRBC 0 /100 WBCs      Segmented % 89 %      Immature Grans % 1 %      Lymphocytes % 5 %      Monocytes % 5 %      Eosinophils Relative 0 %      Basophils Relative 0 %      Absolute Neutrophils 18.95 Thousands/µL      Absolute Immature Grans 0.16 Thousand/uL      Absolute Lymphocytes 0.99 Thousands/µL      Absolute Monocytes 1.00  Thousand/µL      Eosinophils Absolute 0.05 Thousand/µL      Basophils Absolute 0.08 Thousands/µL     HS Troponin I 4hr [[id number]]     Lab Status: No result Specimen: Blood     Comprehensive metabolic panel [[id number]]  (Abnormal) Collected: 10/26/24 1907    Lab Status: Final result Specimen: Blood from Arm, Right Updated: 10/26/24 2004     Sodium 142 mmol/L      Potassium 3.5 mmol/L      Chloride 100 mmol/L      CO2 26 mmol/L      ANION GAP 16 mmol/L      BUN 19 mg/dL      Creatinine 0.60 mg/dL      Glucose 176 mg/dL      Calcium 8.4 mg/dL      Corrected Calcium 9.5 mg/dL      AST 27 U/L      ALT 5 U/L      Alkaline Phosphatase 71 U/L      Total Protein 6.0 g/dL      Albumin 2.6 g/dL      Total Bilirubin 0.84 mg/dL      eGFR 75 ml/min/1.73sq m     Narrative:      [employer] guidelines for Chronic Kidney Disease (CKD):     Stage 1 with normal or high GFR (GFR > 90 mL/min/1.73 square meters)    Stage 2 Mild CKD (GFR = 60-89 mL/min/1.73 square meters)    Stage 3A Moderate CKD (GFR = 45-59 mL/min/1.73 square meters)    Stage 3B Moderate CKD (GFR = 30-44 mL/min/1.73 square meters)    Stage 4 Severe CKD (GFR = 15-29 mL/min/1.73 square meters)    Stage 5 End Stage CKD (GFR <15 mL/min/1.73 square meters)  Note: GFR calculation is accurate only with a steady state creatinine    Phosphorus [[id number]]  (Normal) Collected: 10/26/24 1907    Lab Status: Final result Specimen: Blood from Arm, Right Updated: 10/26/24 2004     Phosphorus 2.6 mg/dL     Magnesium [[id number]]  (Normal) Collected: 10/26/24 1907    Lab Status: Final result Specimen: Blood from Arm, Right Updated: 10/26/24 2004     Magnesium 2.0 mg/dL     Lactic acid, plasma (w/reflex if result > 2.0) [[id number]]  (Abnormal) Collected: 10/26/24 1907    Lab Status: Final result Specimen: Blood from Arm, Right Updated: 10/26/24 2004     LACTIC ACID 2.8 mmol/L     Narrative:      Result may be elevated if tourniquet was used during collection.     Lactic acid 2 Hours [[id number]]     Lab Status: No result Specimen: Blood     HS Troponin 0hr (reflex protocol) [[id number]]  (Abnormal) Collected: 10/26/24 1907    Lab Status: Final result Specimen: Blood from Arm, Right Updated: 10/26/24 2000     hs TnI 0hr 109 ng/L     Blood culture #1 [[id number]] Collected: 10/26/24 1907    Lab Status: In process Specimen: Blood from Arm, Right Updated: 10/26/24 1937    Blood culture #2 [[id number]] Collected: 10/26/24 1907    Lab Status: No result Specimen: Blood from Arm, Right             XR chest 1 view portable    (Results Pending)   CT chest abdomen pelvis wo contrast    (Results Pending)       ECG 12 Lead Documentation Only    Date/Time: 10/26/2024 11:10 PM    Performed by: Kash Valladares DO  Authorized by: Kash Valladares DO    ECG reviewed by me, the ED Provider: yes    Patient location:  ED  Previous ECG:     Previous ECG:  Compared to current    Comparison ECG info:  New onset atrial fibrillation    Similarity:  Changes noted  Interpretation:     Interpretation: abnormal    Rate:     ECG rate:  141    ECG rate assessment: tachycardic    Rhythm:     Rhythm: atrial fibrillation    Ectopy:     Ectopy: none    QRS:     QRS axis:  Indeterminate  Conduction:     Conduction: abnormal      Abnormal conduction: incomplete RBBB    ST segments:     ST segments:  Normal  T waves:     T waves: inverted      Inverted:  V3  Other findings:     Other findings: poor R wave progression and prolonged qTc interval        ED Medication and Procedure Management   Prior to Admission Medications   Prescriptions Last Dose Informant Patient Reported? Taking?   Cyanocobalamin 1000 MCG CAPS   Yes No   Sig: Take 1,000 mcg by mouth daily   Melatonin 5 MG/15ML LIQD   Yes No   Sig: Take 5 mg by mouth daily at bedtime   albuterol (2.5 mg/3 mL) 0.083 % nebulizer solution   Yes No   Sig: Inhale 2.5 mg Three times daily as needed for wheezing   apixaban (Eliquis) 2.5 mg  Family Member Yes No   Sig: Take 2.5 mg  by mouth 2 (two) times a day   budesonide (PULMICORT) 0.5 mg/2 mL nebulizer solution   No No   Sig: Take 2 mL (0.5 mg total) by nebulization every 12 (twelve) hours Rinse mouth after use.   dorzolamide-timolol (COSOPT) 2-0.5 % ophthalmic solution   Yes No   Si drop 2 (two) times a day   escitalopram (LEXAPRO) 5 mg tablet   No No   Sig: Take 1 tablet (5 mg total) by mouth daily   famotidine (PEPCID) 10 mg tablet   No No   Sig: Take 1 tablet (10 mg total) by mouth daily   gabapentin (NEURONTIN) 100 mg capsule   No No   Sig: Take 1 capsule (100 mg total) by mouth 2 (two) times a day   latanoprost (XALATAN) 0.005 % ophthalmic solution   Yes No   Sig: Administer 1 drop to both eyes   metoprolol tartrate (LOPRESSOR) 25 mg tablet  Family Member Yes No   Sig: Take 12.5 mg by mouth every 12 (twelve) hours   pantoprazole (PROTONIX) 40 mg tablet   Yes No   Sig: Take 40 mg by mouth every morning   pravastatin (PRAVACHOL) 20 mg tablet   Yes No   Sig: Take 20 mg by mouth daily   sodium phosphate 3 mmol/mL   Yes No   Sig: Insert 1 enema into the rectum daily as needed      Facility-Administered Medications: None     Patient's Medications   Discharge Prescriptions    No medications on file     No discharge procedures on file.  ED SEPSIS DOCUMENTATION   Time reflects when diagnosis was documented in both MDM as applicable and the Disposition within this note       Time User Action Codes Description Comment    10/26/2024 10:48 PM Kash Valladares [I48.20] Chronic atrial fibrillation (HCC)     10/26/2024 10:48 PM Kash Valladares [R10.84] Generalized abdominal pain     10/26/2024 10:48 PM Kash Valladares [L89.159] Pressure injury of skin of sacral region, unspecified injury stage     10/26/2024 10:49 PM Kash Valladares [T83.511A,  N39.0] Urinary tract infection associated with indwelling urethral catheter, initial encounter  ([facility])     10/26/2024 10:49 PM Kash Valladares Modify [I48.20] Chronic atrial fibrillation ([facility])     10/26/2024  10:49 PM Kash Valladares Modify [T83.511A,  N39.0] Urinary tract infection associated with indwelling urethral catheter, initial encounter  ([facility])     10/26/2024 11:07 PM Raymond Singh Add [R62.7] Failure to thrive in adult                  Kash Valladares DO  10/26/24 9311

## 2024-10-26 NOTE — ED ATTENDING ATTESTATION
10/26/2024  I, Naresh Healy MD, saw and evaluated the patient. I have discussed the patient with the resident/non-physician practitioner and agree with the resident's/non-physician practitioner's findings, Plan of Care, and MDM as documented in the resident's/non-physician practitioner's note, except where noted. All available labs and Radiology studies were reviewed.  I was present for key portions of any procedure(s) performed by the resident/non-physician practitioner and I was immediately available to provide assistance.       At this point I agree with the current assessment done in the Emergency Department.  I have conducted an independent evaluation of this patient a history and physical is as follows:    Briefly, 89-year-old female with history of dementia presenting with A-fib with RVR as well as altered mental status.  History notable for recent cessation of metoprolol as well as Eliquis, also noted to have increased confusion as well as decreased oral intake although patient still able to take in some Ensure.  Vital signs remarkable for elevated temperature, examination remarkable for irregular irregular tachycardia, bibasilar crackles, equal breath sounds bilaterally.  Abdomen tender to palpation suprapubic area, Chung catheter in place.  No swelling the lower extremities.  GCS of 14, with confused verbal responses but otherwise spontaneously awake and alert.  Multiple pressure ulcers noted as detailed in resident note, largest of which is in the sacral area, not appear acutely infected at this time.  Heart rate responded to IV fluids as well as IV metoprolol, suspect rapid heart rate due to combination of recent cessation of metoprolol, dehydration, as well as severe sepsis with urinary tract infection as a source.  Patient resuscitated in emergency department cautiously with 500 mL boluses with concern for volume overload, troponin downtrending with rate control with metoprolol and IV fluids in  the emergency department.  Started on broad-spectrum antibiotics.  Held extensive goals of care discussion with patient's granddaughter who is the power of .  Discussed with patient's significant illness, as well as high potential for decompensation.  Goals of care at this time as stated by POA are the patient live to be 100, antibiotics, IV fluids, IV medications are all right, as is noninvasive positive pressure ventilation, however no intubation, no CPR, and no surgical procedures including debridement of sacral decubitus ulcer.  Placement of PEG tube and parenteral nutrition or fluids also not desired.  Hemodynamically stable and comfortable at time of admit.    ED Course       Critical Care Time  CriticalCare Time    Date/Time: 10/26/2024 10:50 PM    Performed by: Naresh Healy MD  Authorized by: Naresh Healy MD    Critical care provider statement:     Critical care time (minutes):  35    Critical care time was exclusive of:  Separately billable procedures and treating other patients and teaching time    Critical care was necessary to treat or prevent imminent or life-threatening deterioration of the following conditions:  CNS failure or compromise, sepsis and cardiac failure    Critical care was time spent personally by me on the following activities:  Obtaining history from patient or surrogate, development of treatment plan with patient or surrogate, discussions with consultants, examination of patient, evaluation of patient's response to treatment, ordering and performing treatments and interventions, ordering and review of laboratory studies, ordering and review of radiographic studies, re-evaluation of patient's condition and review of old charts

## 2024-10-27 ENCOUNTER — APPOINTMENT (INPATIENT)
Dept: CT IMAGING | Facility: HOSPITAL | Age: 89
DRG: 853 | End: 2024-10-27
Payer: MEDICARE

## 2024-10-27 PROBLEM — A41.9 SEPSIS (HCC): Status: ACTIVE | Noted: 2024-10-27

## 2024-10-27 PROBLEM — R41.82 ALTERED MENTAL STATUS: Status: ACTIVE | Noted: 2024-10-27

## 2024-10-27 PROBLEM — R62.7 FAILURE TO THRIVE IN ADULT: Status: ACTIVE | Noted: 2024-10-27

## 2024-10-27 PROBLEM — L89.90 PRESSURE ULCERS OF SKIN OF MULTIPLE TOPOGRAPHIC SITES: Status: ACTIVE | Noted: 2024-10-27

## 2024-10-27 PROBLEM — R13.10 DYSPHAGIA: Status: ACTIVE | Noted: 2024-10-27

## 2024-10-27 PROBLEM — R79.89 ELEVATED TROPONIN: Status: ACTIVE | Noted: 2024-10-27

## 2024-10-27 PROBLEM — N39.0 UTI (URINARY TRACT INFECTION): Status: ACTIVE | Noted: 2024-10-27

## 2024-10-27 LAB
2HR DELTA HS TROPONIN: -22 NG/L
4HR DELTA HS TROPONIN: -29 NG/L
4HR DELTA HS TROPONIN: 4 NG/L
ALBUMIN SERPL BCG-MCNC: 2.5 G/DL (ref 3.5–5)
ALP SERPL-CCNC: 63 U/L (ref 34–104)
ALT SERPL W P-5'-P-CCNC: 8 U/L (ref 7–52)
AMMONIA PLAS-SCNC: 22 UMOL/L (ref 18–72)
ANION GAP SERPL CALCULATED.3IONS-SCNC: 9 MMOL/L (ref 4–13)
AST SERPL W P-5'-P-CCNC: 14 U/L (ref 13–39)
ATRIAL RATE: 97 BPM
BASOPHILS # BLD AUTO: 0.04 THOUSANDS/ΜL (ref 0–0.1)
BASOPHILS NFR BLD AUTO: 0 % (ref 0–1)
BILIRUB SERPL-MCNC: 0.68 MG/DL (ref 0.2–1)
BNP SERPL-MCNC: 591 PG/ML (ref 0–100)
BUN SERPL-MCNC: 17 MG/DL (ref 5–25)
CALCIUM ALBUM COR SERPL-MCNC: 9.3 MG/DL (ref 8.3–10.1)
CALCIUM SERPL-MCNC: 8.1 MG/DL (ref 8.4–10.2)
CARDIAC TROPONIN I PNL SERPL HS: 113 NG/L
CARDIAC TROPONIN I PNL SERPL HS: 121 NG/L
CARDIAC TROPONIN I PNL SERPL HS: 92 NG/L
CARDIAC TROPONIN I PNL SERPL HS: 99 NG/L
CHLORIDE SERPL-SCNC: 101 MMOL/L (ref 96–108)
CO2 SERPL-SCNC: 35 MMOL/L (ref 21–32)
CREAT SERPL-MCNC: 0.57 MG/DL (ref 0.6–1.3)
EOSINOPHIL # BLD AUTO: 0.03 THOUSAND/ΜL (ref 0–0.61)
EOSINOPHIL NFR BLD AUTO: 0 % (ref 0–6)
ERYTHROCYTE [DISTWIDTH] IN BLOOD BY AUTOMATED COUNT: 18.6 % (ref 11.6–15.1)
GFR SERPL CREATININE-BSD FRML MDRD: 76 ML/MIN/1.73SQ M
GLUCOSE SERPL-MCNC: 160 MG/DL (ref 65–140)
HCT VFR BLD AUTO: 30.9 % (ref 34.8–46.1)
HGB BLD-MCNC: 9.4 G/DL (ref 11.5–15.4)
IMM GRANULOCYTES # BLD AUTO: 0.07 THOUSAND/UL (ref 0–0.2)
IMM GRANULOCYTES NFR BLD AUTO: 1 % (ref 0–2)
LACTATE SERPL-SCNC: 2 MMOL/L (ref 0.5–2)
LYMPHOCYTES # BLD AUTO: 0.83 THOUSANDS/ΜL (ref 0.6–4.47)
LYMPHOCYTES NFR BLD AUTO: 6 % (ref 14–44)
MCH RBC QN AUTO: 29.7 PG (ref 26.8–34.3)
MCHC RBC AUTO-ENTMCNC: 30.4 G/DL (ref 31.4–37.4)
MCV RBC AUTO: 98 FL (ref 82–98)
MONOCYTES # BLD AUTO: 0.7 THOUSAND/ΜL (ref 0.17–1.22)
MONOCYTES NFR BLD AUTO: 5 % (ref 4–12)
NEUTROPHILS # BLD AUTO: 12.02 THOUSANDS/ΜL (ref 1.85–7.62)
NEUTS SEG NFR BLD AUTO: 88 % (ref 43–75)
NRBC BLD AUTO-RTO: 0 /100 WBCS
PLATELET # BLD AUTO: 363 THOUSANDS/UL (ref 149–390)
PMV BLD AUTO: 9.4 FL (ref 8.9–12.7)
POTASSIUM SERPL-SCNC: 2.8 MMOL/L (ref 3.5–5.3)
POTASSIUM SERPL-SCNC: 3 MMOL/L (ref 3.5–5.3)
PROCALCITONIN SERPL-MCNC: 0.2 NG/ML
PROCALCITONIN SERPL-MCNC: 0.22 NG/ML
PROT SERPL-MCNC: 5.7 G/DL (ref 6.4–8.4)
QRS AXIS: 183 DEGREES
QRSD INTERVAL: 118 MS
QT INTERVAL: 354 MS
QTC INTERVAL: 542 MS
RBC # BLD AUTO: 3.16 MILLION/UL (ref 3.81–5.12)
SODIUM SERPL-SCNC: 145 MMOL/L (ref 135–147)
T WAVE AXIS: 50 DEGREES
TSH SERPL DL<=0.05 MIU/L-ACNC: 3.14 UIU/ML (ref 0.45–4.5)
VENTRICULAR RATE: 141 BPM
WBC # BLD AUTO: 13.69 THOUSAND/UL (ref 4.31–10.16)

## 2024-10-27 PROCEDURE — 99223 1ST HOSP IP/OBS HIGH 75: CPT | Performed by: INTERNAL MEDICINE

## 2024-10-27 PROCEDURE — 85025 COMPLETE CBC W/AUTO DIFF WBC: CPT

## 2024-10-27 PROCEDURE — 80053 COMPREHEN METABOLIC PANEL: CPT

## 2024-10-27 PROCEDURE — 93005 ELECTROCARDIOGRAM TRACING: CPT

## 2024-10-27 PROCEDURE — 0JB70ZZ EXCISION OF BACK SUBCUTANEOUS TISSUE AND FASCIA, OPEN APPROACH: ICD-10-PCS | Performed by: SURGERY

## 2024-10-27 PROCEDURE — 93010 ELECTROCARDIOGRAM REPORT: CPT | Performed by: INTERNAL MEDICINE

## 2024-10-27 PROCEDURE — 84145 PROCALCITONIN (PCT): CPT

## 2024-10-27 PROCEDURE — 84132 ASSAY OF SERUM POTASSIUM: CPT

## 2024-10-27 PROCEDURE — 70450 CT HEAD/BRAIN W/O DYE: CPT

## 2024-10-27 PROCEDURE — 84484 ASSAY OF TROPONIN QUANT: CPT

## 2024-10-27 PROCEDURE — NC001 PR NO CHARGE: Performed by: SURGERY

## 2024-10-27 PROCEDURE — 82140 ASSAY OF AMMONIA: CPT

## 2024-10-27 PROCEDURE — 92610 EVALUATE SWALLOWING FUNCTION: CPT

## 2024-10-27 RX ORDER — METOPROLOL TARTRATE 1 MG/ML
5 INJECTION, SOLUTION INTRAVENOUS EVERY 12 HOURS
Status: DISCONTINUED | OUTPATIENT
Start: 2024-10-27 | End: 2024-10-30

## 2024-10-27 RX ORDER — METOPROLOL TARTRATE 1 MG/ML
5 INJECTION, SOLUTION INTRAVENOUS ONCE
Status: COMPLETED | OUTPATIENT
Start: 2024-10-27 | End: 2024-10-27

## 2024-10-27 RX ORDER — AMOXICILLIN 250 MG
1 CAPSULE ORAL
Status: DISCONTINUED | OUTPATIENT
Start: 2024-10-28 | End: 2024-10-31 | Stop reason: HOSPADM

## 2024-10-27 RX ORDER — POTASSIUM CHLORIDE 14.9 MG/ML
20 INJECTION INTRAVENOUS ONCE
Status: COMPLETED | OUTPATIENT
Start: 2024-10-27 | End: 2024-10-27

## 2024-10-27 RX ORDER — POTASSIUM CHLORIDE 1500 MG/1
40 TABLET, EXTENDED RELEASE ORAL DAILY
Status: DISCONTINUED | OUTPATIENT
Start: 2024-10-27 | End: 2024-10-28

## 2024-10-27 RX ORDER — ENOXAPARIN SODIUM 100 MG/ML
1 INJECTION SUBCUTANEOUS EVERY 12 HOURS SCHEDULED
Status: DISCONTINUED | OUTPATIENT
Start: 2024-10-27 | End: 2024-10-30

## 2024-10-27 RX ORDER — SODIUM CHLORIDE, SODIUM GLUCONATE, SODIUM ACETATE, POTASSIUM CHLORIDE, MAGNESIUM CHLORIDE, SODIUM PHOSPHATE, DIBASIC, AND POTASSIUM PHOSPHATE .53; .5; .37; .037; .03; .012; .00082 G/100ML; G/100ML; G/100ML; G/100ML; G/100ML; G/100ML; G/100ML
50 INJECTION, SOLUTION INTRAVENOUS CONTINUOUS
Status: DISPENSED | OUTPATIENT
Start: 2024-10-27 | End: 2024-10-27

## 2024-10-27 RX ORDER — PANTOPRAZOLE SODIUM 40 MG/1
40 TABLET, DELAYED RELEASE ORAL EVERY MORNING
Status: DISCONTINUED | OUTPATIENT
Start: 2024-10-27 | End: 2024-10-31 | Stop reason: HOSPADM

## 2024-10-27 RX ORDER — SODIUM CHLORIDE, SODIUM GLUCONATE, SODIUM ACETATE, POTASSIUM CHLORIDE, MAGNESIUM CHLORIDE, SODIUM PHOSPHATE, DIBASIC, AND POTASSIUM PHOSPHATE .53; .5; .37; .037; .03; .012; .00082 G/100ML; G/100ML; G/100ML; G/100ML; G/100ML; G/100ML; G/100ML
75 INJECTION, SOLUTION INTRAVENOUS CONTINUOUS
Status: DISPENSED | OUTPATIENT
Start: 2024-10-27 | End: 2024-10-28

## 2024-10-27 RX ADMIN — ENOXAPARIN SODIUM 60 MG: 60 INJECTION SUBCUTANEOUS at 13:53

## 2024-10-27 RX ADMIN — SODIUM CHLORIDE, SODIUM GLUCONATE, SODIUM ACETATE, POTASSIUM CHLORIDE, MAGNESIUM CHLORIDE, SODIUM PHOSPHATE, DIBASIC, AND POTASSIUM PHOSPHATE 75 ML/HR: .53; .5; .37; .037; .03; .012; .00082 INJECTION, SOLUTION INTRAVENOUS at 19:29

## 2024-10-27 RX ADMIN — METOROPROLOL TARTRATE 5 MG: 5 INJECTION, SOLUTION INTRAVENOUS at 13:53

## 2024-10-27 RX ADMIN — POTASSIUM CHLORIDE 20 MEQ: 200 INJECTION, SOLUTION INTRAVENOUS at 17:55

## 2024-10-27 RX ADMIN — CEFTRIAXONE 1000 MG: 2 INJECTION, POWDER, FOR SOLUTION INTRAMUSCULAR; INTRAVENOUS at 09:04

## 2024-10-27 RX ADMIN — POTASSIUM CHLORIDE 20 MEQ: 200 INJECTION, SOLUTION INTRAVENOUS at 10:07

## 2024-10-27 RX ADMIN — SODIUM CHLORIDE, SODIUM GLUCONATE, SODIUM ACETATE, POTASSIUM CHLORIDE, MAGNESIUM CHLORIDE, SODIUM PHOSPHATE, DIBASIC, AND POTASSIUM PHOSPHATE 50 ML/HR: .53; .5; .37; .037; .03; .012; .00082 INJECTION, SOLUTION INTRAVENOUS at 01:22

## 2024-10-27 RX ADMIN — METOROPROLOL TARTRATE 5 MG: 5 INJECTION, SOLUTION INTRAVENOUS at 19:28

## 2024-10-27 NOTE — ASSESSMENT & PLAN NOTE
History of hypertension and A-fib, supposed to be on Lopressor 12.5 mg twice daily-has not been taking this medication for the past 2 weeks  We will continue Lopressor 12.5 mg daily

## 2024-10-27 NOTE — PLAN OF CARE
Dysphagia 1 diet- puree solids, with NTL  Meds crushed/whole in puree as able.  Aspiration precautions, careful hand-feeding when participating       Dinah Parker MS, CCC-SLP  Speech Pathologist  10/27/24  11:15 AM

## 2024-10-27 NOTE — ASSESSMENT & PLAN NOTE
Daughter reports that the patient has been having poor oral intake for the past 2 weeks and has been weak and fatigued  She has been just drinking her Ensure but with poor oral intake  History of dysphagia  BMI within normal limits but very dry on physical exam  Plan   nutrition supplements   speech and swallow evaluation  Inpatient consult to nutrition for further recommendations

## 2024-10-27 NOTE — ASSESSMENT & PLAN NOTE
Met sepsis criteria with tachycardia, leukocytosis of 21K, lactic acidosis and suspected source of infection being urinary tract infection  Status post 1.5 L bolus of Isolyte in the ED  Received 1 dose of IV cefepime in the ED  She has multiple pressure wounds including a sacral ulcers-does not look infected at this time  Plan  Follow-up on CT abdomen pelvis chest without contrast  Gentle IV hydration  IV ceftriaxone for suspected source of infection being UTI  Follow-up on wound cultures  Follow-up on blood cultures  Follow-up on urine cultures

## 2024-10-27 NOTE — ASSESSMENT & PLAN NOTE
General surgery team consulted for sacral wound.    Plan:  - Bedside debridement performed  - Wound packed with kerlix and dressed with ABD  - Will plan for wound check in next 1-2 days  - Recommend prophylactic lovenox instead of therapeutic in setting of further debridement plans

## 2024-10-27 NOTE — H&P
H&P - Hospitalist   Name: Grace Clayton 89 y.o. female I MRN: [id number]  Unit/Bed#: S -01 I Date of Admission: 10/26/2024   Date of Service: 10/27/2024 I Hospital Day: 1     Assessment & Plan  Sepsis (HCC)  Met sepsis criteria with tachycardia, leukocytosis of 21K, lactic acidosis and suspected source of infection being urinary tract infection  Status post 1.5 L bolus of Isolyte in the ED  Received 1 dose of IV cefepime in the ED  She has multiple pressure wounds including a sacral ulcers-does not look infected at this time  Plan  Follow-up on CT abdomen pelvis chest without contrast  Gentle IV hydration  IV ceftriaxone for suspected source of infection being UTI  Follow-up on wound cultures  Follow-up on blood cultures  Follow-up on urine cultures    UTI (urinary tract infection)  Urinalysis positive for leukocytes, bacteria, blood, positive nitrites  Presented with altered mental status  No suprapubic tenderness  Met sepsis criteria  Plan  IV ceftriaxone  Follow-up urine culture  On the plan under sepsis  Acute encephalopathy  Patient has history of dementia with behavioral disturbance  Per granddaughter patient was at baseline mental status until today morning.  Morning patient's mental status patient was not, started repeating words, worsening swallow difficulty  She has dysphagia and baseline oral intake has been very poor for the past 2 weeks, was just taking Ensure  In the ED, met sepsis criteria, with urinalysis suggestive of UTI, status post IV fluid resuscitation and IV cefepime  Acute encephalopathy possibly secondary to UTI or other infection versus worsening dementia versus stroke versus delirium  Plan  CT head to rule out stroke or other intracranial abnormality due to acute onset of dysphagia and repeat words  Treatment for UTI  Ammonia levels  TSH levels  Monitor electrolytes  Delirium precautions  Fall precautions  Consider geriatrics consult  Hyperlipidemia  History of hyperlipidemia, patient  was supposed to be on pravastatin 20 mg daily-has not taken these medications for the past 2 weeks  Essential hypertension  History of hypertension and A-fib, supposed to be on Lopressor 12.5 mg twice daily-has not been taking this medication for the past 2 weeks  We will continue Lopressor 12.5 mg daily  Paroxysmal atrial fibrillation (HCC)  On admission, patient was in A-fib with RVR  Has been off Eliquis and Lopressor for the past 2 weeks   Received 1 dose of IV Lopressor 5 mg in the ED after which the heart rates back to normal limits  Plan  Restart Eliquis 2.5 mg twice daily and Lopressor 12.5 mg twice daily  Continue to monitor heart rates  Elevated troponin  Patient presented with altered mental status and poor oral intake  No chest pain  Troponin levels in the ED elevated at-0 hour at 109, 2 hr at 101 ,4 hr 113  and delta-29  No ischemic changes on the EKG  BNP at 591 but patient looks very dry and no signs of volume overload-last echo in February 2024 showed ejection fraction of 70% days with normal systolic function, mild concentric hypertrophy  Has had a history of non MI troponin elevation in the past  Likely related to A-fib with RVR causing non-MI troponin elevation  Plan  Continue to monitor heart rates and keep the patient on telemetry   monitor for symptoms    Ambulatory dysfunction  Bedbound  PT/OT evaluation  Pressure ulcers of skin of multiple topographic sites  Patient has multiple pressure ulcers including a sacral wound  Does not appear infected  However presented with sepsis criteria likely secondary to UTI  Plan  On IV ceftriaxone currently for the UTI  Wound cultures  Wound care consult  Follow-up CT abdomen pelvis  Dysphagia  Patient has history on dysphagia, per chart review patient was on puréed diet with thin liquid  Nursing dysphagia screen was done with dysphagia diet-nurse report patient is unable to swallow  Plan  Will keep the patient n.p.o. for now  Follow-up on CT head for any  acute intracranial changes  Speech and swallow evaluation  Failure to thrive in adult  Daughter reports that the patient has been having poor oral intake for the past 2 weeks and has been weak and fatigued  She has been just drinking her Ensure but with poor oral intake  History of dysphagia  BMI within normal limits but very dry on physical exam  Plan   nutrition supplements   speech and swallow evaluation  Inpatient consult to nutrition for further recommendations      VTE Pharmacologic Prophylaxis:   High Risk (Score >/= 5) - Pharmacological DVT Prophylaxis Ordered: apixaban (Eliquis). Sequential Compression Devices Ordered.  Code Status: Level 3 - DNAR and DNI   Discussion with family: Updated  (grand daughter) via phone.    Anticipated Length of Stay: Patient will be admitted on an inpatient basis with an anticipated length of stay of greater than 2 midnights secondary to altered mental status , uti.    History of Present Illness   Chief Complaint: Worsening mental status, dysphagia and oral intake    Grace Clayton is a 89 y.o. female with a PMH of afib , dementia with agitation, sacral wound, GERD, hypertension, hyperlipidemia, anxiety who presents from home with 1 day history of altered mental status and 2 weeks history of poor oral intake and increased fatigue.  Her granddaughter reports that she was admitted 2 weeks back to [facility] for JUSTINA and dehydration and since returning home her oral intake has been very poor and she has been fatigued.  She has not has not been getting any of her home medications for the past 2 weeks, as the granddaughter was confused since the patient's doctor recommended discontinuing the medications.  Today, her granddaughter noticed that the patient's mental status worsened and she started repeating words over and over again , had worsening dysphagia with acute swallowing difficulties.  In the ED, she was noted to be in A-fib with RVR with heart  rate of 141 per minute.  Labs significant for leukocytosis of 21K, mildly elevated troponin 0 - 109, lactic acidosis of 2.8.  UA showed large leukocytes, bacteria, occult blood and positive nitrites.  Met sepsis criteria.  Received 1 dose of IV cefepime in the ED and 1.5 L of Isolyte bolus.  Patient was admitted under internal medicine service for further evaluation and management.  Review of Systems   Reason unable to perform ROS: Altered mental status.       Historical Information   Past Medical History:   Diagnosis Date    Atrial fibrillation (HCC)     Dementia (HCC)     Hypertension      History reviewed. No pertinent surgical history.  Social History     Tobacco Use    Smoking status: Never    Smokeless tobacco: Never   Substance and Sexual Activity    Alcohol use: Never    Drug use: Never    Sexual activity: Not on file     E-Cigarette/Vaping     E-Cigarette/Vaping Substances       Social History:  Marital Status: Unknown   Occupation:   Patient Pre-hospital Living Situation: Home  Patient Pre-hospital Level of Mobility: walks  Patient Pre-hospital Diet Restrictions: dysphagia    Meds/Allergies   I have reviewed home medications with patient family member.  Prior to Admission medications    Medication Sig Start Date End Date Taking? Authorizing Provider   apixaban (Eliquis) 2.5 mg Take 2.5 mg by mouth 2 (two) times a day   Yes Historical Provider, MD   Cyanocobalamin 1000 MCG CAPS Take 1,000 mcg by mouth daily 1/2/24 1/1/25 Yes Historical Provider, MD   dorzolamide-timolol (COSOPT) 2-0.5 % ophthalmic solution 1 drop 2 (two) times a day 2/7/24  Yes Historical Provider, MD   latanoprost (XALATAN) 0.005 % ophthalmic solution Administer 1 drop to both eyes 2/7/24  Yes Historical Provider, MD   Melatonin 5 MG/15ML LIQD Take 5 mg by mouth daily at bedtime 1/2/24  Yes Historical Provider, MD   metoprolol tartrate (LOPRESSOR) 25 mg tablet Take 12.5 mg by mouth every 12 (twelve) hours   Yes Historical Provider, MD    pantoprazole (PROTONIX) 40 mg tablet Take 40 mg by mouth every morning 1/2/24  Yes Historical Provider, MD   pravastatin (PRAVACHOL) 20 mg tablet Take 20 mg by mouth daily   Yes Historical Provider, MD   sodium phosphate 3 mmol/mL Insert 1 enema into the rectum daily as needed   Yes Historical Provider, MD   albuterol (2.5 mg/3 mL) 0.083 % nebulizer solution Inhale 2.5 mg Three times daily as needed for wheezing  Patient not taking: Reported on 10/27/2024 2/7/24 2/14/25  Historical Provider, MD   budesonide (PULMICORT) 0.5 mg/2 mL nebulizer solution Take 2 mL (0.5 mg total) by nebulization every 12 (twelve) hours Rinse mouth after use. 2/21/24 5/21/24  Candelaria Lucas DO   escitalopram (LEXAPRO) 5 mg tablet Take 1 tablet (5 mg total) by mouth daily 1/17/24 4/5/24  Lilian Vargas MD   famotidine (PEPCID) 10 mg tablet Take 1 tablet (10 mg total) by mouth daily 2/21/24 4/21/24  Candelaria Lucas DO   gabapentin (NEURONTIN) 100 mg capsule Take 1 capsule (100 mg total) by mouth 2 (two) times a day 1/17/24 2/19/24  Lilian Vargas MD     Allergies   Allergen Reactions    Penicillins Hives       Objective :  Temp:  [98.6 °F (37 °C)-100.1 °F (37.8 °C)] 98.7 °F (37.1 °C)  HR:  [] 146  BP: (121-163)/(64-91) 144/91  Resp:  [18] 18  SpO2:  [92 %-95 %] 93 %  O2 Device: None (Room air)    Physical Exam  HENT:      Mouth/Throat:      Mouth: Mucous membranes are dry.   Eyes:      Extraocular Movements: Extraocular movements intact.      Pupils: Pupils are equal, round, and reactive to light.   Cardiovascular:      Rate and Rhythm: Normal rate and regular rhythm.      Pulses: Normal pulses.      Heart sounds: Normal heart sounds.   Pulmonary:      Effort: Pulmonary effort is normal.      Breath sounds: Normal breath sounds.   Abdominal:      General: Abdomen is flat.      Palpations: Abdomen is soft.   Skin:     General: Skin is warm.      Capillary Refill: Capillary refill takes less than 2  seconds.      Comments: Multiple pressure ulcers of the body   sacral ulcer, does not look infected   Neurological:      Mental Status: She is alert. She is disoriented.      Comments: And I tried talking to the patient patient kept on repeating the same thing again and again and was not interested, was getting agitated          Lines/Drains:  Lines/Drains/Airways       Active Status       Name Placement date Placement time Site Days    Urethral Catheter Non-latex;Temperature probe 16 Fr. 10/26/24  2104  Non-latex;Temperature probe  less than 1                  Urinary Catheter:  Goal for removal:                Lab Results: I have reviewed the following results:  Results from last 7 days   Lab Units 10/26/24  1907   WBC Thousand/uL 21.23*   HEMOGLOBIN g/dL 9.7*   HEMATOCRIT % 32.4*   PLATELETS Thousands/uL 455*   SEGS PCT % 89*   LYMPHO PCT % 5*   MONO PCT % 5   EOS PCT % 0     Results from last 7 days   Lab Units 10/26/24  1907   SODIUM mmol/L 142   POTASSIUM mmol/L 3.5   CHLORIDE mmol/L 100   CO2 mmol/L 26   BUN mg/dL 19   CREATININE mg/dL 0.60   ANION GAP mmol/L 16*   CALCIUM mg/dL 8.4   ALBUMIN g/dL 2.6*   TOTAL BILIRUBIN mg/dL 0.84   ALK PHOS U/L 71   ALT U/L 5*   AST U/L 27   GLUCOSE RANDOM mg/dL 176*             Lab Results   Component Value Date    HGBA1C 6.1 (H) 10/02/2021    HGBA1C 6.1 (H) 09/29/2020    HGBA1C 5.9 (H) 08/08/2019     Results from last 7 days   Lab Units 10/26/24  2342 10/26/24  1907   LACTIC ACID mmol/L 2.0 2.8*             Administrative Statements       ** Please Note: This note has been constructed using a voice recognition system. **

## 2024-10-27 NOTE — ASSESSMENT & PLAN NOTE
Patient presented with altered mental status and poor oral intake  No chest pain  Troponin levels in the ED elevated at-0 hour at 109, 2 hr at 101 ,4 hr 113  and delta-29  No ischemic changes on the EKG  BNP at 591 but patient looks very dry and no signs of volume overload-last echo in February 2024 showed ejection fraction of 70% days with normal systolic function, mild concentric hypertrophy  Has had a history of non MI troponin elevation in the past  Likely related to A-fib with RVR causing non-MI troponin elevation  Plan  Continue to monitor heart rates and keep the patient on telemetry   monitor for symptoms

## 2024-10-27 NOTE — ASSESSMENT & PLAN NOTE
Met sepsis criteria with leukocytosis 21 and lactate 2.8 with suspected UTI source as UA showed large leukocytes, bacteria, occult blood and positive nitrites.  - Cont abx  - Management per primary

## 2024-10-27 NOTE — ASSESSMENT & PLAN NOTE
History of hyperlipidemia, patient was supposed to be on pravastatin 20 mg daily-has not taken these medications for the past 2 weeks

## 2024-10-27 NOTE — QUICK NOTE
ECHO  Sacral decubitus ulcer. Cortical bony erosion consistent with osteomyelitis of the lower sacrum and upper coccyx.  Moderate bilateral pleural effusions with overlying compressive atelectasis.  Constipation with very large bolus of stool in the rectum and mild stercoral proctitis.  O/E: No suspicion for osteomyelitis, as the wound has a very clean base.    Most likely source of sepsis would be a UTI, we would consult general surgeon to evaluate.    Hypokalemia 3.0    Nurses report: patient is unable to take her oral medications. She is confused, doesn't follow commands and screams all the time.     PLAN  Surgery consult  Continue IV ceftriaxone 1 g daily  Bowel enema tonight for large stool burden  For hypokalemia, IV potassium 20/40 oral  Speech/swallow evaluation.  Convert oral medications to IV: Lopressor 5 mg twice daily as needed, therapeutic Lovenox based on weight  Palliative consult for goals of care discussion with family.

## 2024-10-27 NOTE — HOSPITAL COURSE
Grace Clayton is a 89 y.o. female with a PMH of afib , dementia with agitation, sacral wound, GERD, hypertension, hyperlipidemia, anxiety who presents from home with 1 day history of altered mental status and 2 weeks history of poor oral intake and increased fatigue.  Her granddaughter reports that she was admitted 2 weeks back to [facility] for JUSTINA and dehydration and since returning home her oral intake has been very poor and she has been fatigued.  She has not has not been getting any of her home medications for the past 2 weeks, the patient's doctor recommended discontinuing the medications.  her granddaughter noticed that the patient's mental status worsened and she started repeating words over and over again , had worsening dysphagia with acute swallowing difficulties.    CT brain normal  CT Chest/Abd/pelvis  Sacral decubitus ulcer. Cortical bony erosion consistent with osteomyelitis of the lower sacrum and upper coccyx.  Moderate bilateral pleural effusions with overlying compressive atelectasis.  Constipation with very large bolus of stool in the rectum and mild stercoral proctitis.   Urinalysis large ketones, blood, positive nitrites, protein +1, innumerable white blood cells  On 10/27 she had wound debridements,    O/E:   Has pressure ulcers in the sacral region.  HS: irregular, no mumurs  CHEst: clear  Abd: flat, soft  Urinary cat: clear urine      VITALS:   Afebrile 36.4, UT 70s 200s, RR 18, /91, MAP of 103, SpO2 98%.  Urine output -200 in the past 24 hours     LABS:  Potassium 2.9 now 3.8  None anion gap metabolic acidosis, corrected  Creatinine 0.57/eGFR 79  Magnesium 2.1 normal  Phosphorus 2.6 normal  Ammonia 22 normal  Trops 109/101/1 30s/121, no ischemic changes on ECG  , no signs of chest edema.  Has a history of non-MI troponin elevation in the past also be due to your A-fib with RVR/elderly age.  Lactic acid 2.8 down to 2  Pro-Anastacio 0.22  WBC 21.23 now 13.69 ANC 12   Hb  Sent rx to the pharmacy.   9.4  TSH normal   Urine culture positive for Citrobacter and Pseudomonas.  Antibiotics switched to cefepime yesterday.  Blood cultures no growth in 24 hours.  Gluc: 16 to 170    Assessment  Sepsis: Leukocytosis of 21, lactic acidosis,, tacky.  Foci  Sacral osteomyelitis  UTI  JUSTINA    Acute metabolic encephalopathy secondary to sepsis due to a rapid worsening decline evaluation has dementia at baseline, with worsening swallowing repetition of words and confusions.    A-fib with RVR    As she had stopped taking her Eliquis and a bruise over the past 2 weeks.  She has been resumed on dose 2.5 mg Eliquis and Lopressor 12.5 mg yet to take as she is sleeping.  She is currently being managed for sepsis secondary to UTI/sacral ulcers    PLAN  Received 1.5 L bolus of Isolyte in the ED  5% dextrose + 40meq of K   Urinary retention protocol has been started.  Bladder scan   Repeat lactate  cbc  Has received 1 g IV cefepime  Potassium 20 Iv and 40 PO   Continue Isolyte 50 cc/h  Follow-up urine cultures  Follow-up wound cultures  Follow blood cultures  Wound care consult

## 2024-10-27 NOTE — SPEECH THERAPY NOTE
Speech-Language Pathology Bedside Swallow Evaluation        Patient Name: Grace Clayton    Today's Date: 10/27/2024     Problem List  Principal Problem:    Sepsis (HCC)  Active Problems:    Hyperlipidemia    Ambulatory dysfunction    Paroxysmal atrial fibrillation (HCC)    Essential hypertension    Elevated troponin    UTI (urinary tract infection)    Pressure ulcers of skin of multiple topographic sites    Acute encephalopathy    Dysphagia    Failure to thrive in adult         Past Medical History  Past Medical History:   Diagnosis Date    Atrial fibrillation (HCC)     Dementia (HCC)     Hypertension        Past Surgical History  History reviewed. No pertinent surgical history.    Summary    Pt presents with eating dysfunction consistent with dementia.  Difficult to feed with oral guarding noted, not opening mouth well tongue blocking bolus entry at times.  Throat clearing at times which may be behavioral versus ?functional.  Patient benefited from redirection to task when persistent vocalization noted.  Able to answer simple questions at times.     Recommendations:   Diet: puree/level 1 diet and nectar thick liquids   Meds: crushed with puree   Frequent Oral care: 2x/day or as patient will allow  Aspiration precautions and compensatory swallowing strategies: upright posture, only feed when fully alert, slow rate of feeding, and give cues as needed  Other Recommendations/ considerations: Follow to ensure tolerance and for possible diet level advancement depending on progress in sessions and medical improvements      Current Medical Status  Pt is a 89 y.o. female who presented to [facility]  with sepsis.    Chief Complaint: Worsening mental status, dysphagia and oral intake  Grace Clayton is a 89 y.o. female with a PMH of afib , dementia with agitation, sacral wound, GERD, hypertension, hyperlipidemia, anxiety who presents from home with 1 day history of altered mental status and 2 weeks history of poor oral  intake and increased fatigue.  Her granddaughter reports that she was admitted 2 weeks back to [facility] for JUSTINA and dehydration and since returning home her oral intake has been very poor and she has been fatigued.  She has not has not been getting any of her home medications for the past 2 weeks, as the granddaughter was confused since the patient's doctor recommended discontinuing the medications.  Today, her granddaughter noticed that the patient's mental status worsened and she started repeating words over and over again , had worsening dysphagia with acute swallowing difficulties.  In the ED, she was noted to be in A-fib with RVR with heart rate of 141 per minute.  Labs significant for leukocytosis of 21K, mildly elevated troponin 0 - 109, lactic acidosis of 2.8.  UA showed large leukocytes, bacteria, occult blood and positive nitrites.  Met sepsis criteria.  Received 1 dose of IV cefepime in the ED and 1.5 L of Isolyte bolus.  Patient was admitted under internal medicine service for further evaluation and management.    Past medical history:   Please see H&P for details    Special Studies:  2/21/2024 Video Swallow Study   Assessment Summary:    Mild oral/pharyngeal dysphagia w/ limited consistencies (puree and liquids) characterized by delayed oral processing/transfer, decreased hyolaryngeal excursion, minimal epiglottic inversion and decreased airway entrance closure w/ trace penetration with thin liquids to VC.   Recommendations:   Cont puree diet, advance to thin liquids w/ aspiration precautions  Meds crushed  Liquids by straw    10/27/2024 CT BRAIN - WITHOUT CONTRAST   IMPRESSION:  No acute intracranial abnormality is seen.  Encephalomalacia, chronic appearing white matter changes, and other findings as above.    10/26/2024 CT CHEST, ABDOMEN AND PELVIS WITHOUT IV CONTRAST   IMPRESSION:  Sacral decubitus ulcer. Cortical bony erosion consistent with osteomyelitis of the lower sacrum and  upper coccyx.  Moderate bilateral pleural effusions with overlying compressive atelectasis.  Constipation with very large bolus of stool in the rectum and mild stercoral proctitis.    Social/Education/Vocational Hx:  Pt lives with family    Swallow Information   Prior speech/swallowing tx: yes, VBS in Feb & at admission earlier this year  Current Risks for Dysphagia & Aspiration: known history of dysphagia and AMS  Current Symptoms/Concerns:  Decreased intake  Current Diet: NPO   Baseline Diet: puree/level 1 diet and nectar thick liquids  Takes pills-crushed in purée    Baseline Assessment   Behavior/Cognition: waxing and waning arousal level and able to participate with cues to remain alert  Speech/Language Status: able to follow commands inconsistently and limited verbal output  Patient Positioning: upright in bed     Swallow Mechanism Exam   Facial: symmetrical  Labial: WFL  Lingual: WFL  Velum: unable to visualize  Mandible: unable to test 2/2 limited command following  Dentition: edentulous  Vocal quality:clear/adequate   Volitional Cough: unable to initiate volitional cough   Respiratory: Room air    Consistencies Assessed and Performance   Consistencies Administered: thin liquids, nectar thick, and puree-thin ice water, NTL juice, vanilla pudding    Oral Stage:   Pt with poor mouth opening, uses tongue to guard food/liquid entry. Difficult to feed at times but was able to take small spoons of pudding and liquids via straw with cues given when needed.  Appears to have fair bolus manipulation and A-P transfer time.  Nurse reported oral holding earlier, minimal at this time.  No oral residue evident.    Pharyngeal Stage:   Suspect reduced hyolaryngeal excursion as judged via palpation.  Patient with vocalizations/throat clearing at times.  Unclear if this is behavioral versus functional.  Suspect an element of both.  Throat clearing appeared more prominent with thin liquids versus NTL.  Will observe further  trials of thin liquids and monitor tolerance prior to advancing diet back to baseline.    Esophageal Concerns: none reported      Results Reviewed with: patient, RN, and MD   Dysphagia Goals: pt will tolerate puréed solids with NTL without s/s of aspiration x 1-3 sessions; possible diet level advancement trials based on progress in sessions        Dinah Parker MS, CCC-SLP  Speech Pathologist  10/27/24  11:34 AM

## 2024-10-27 NOTE — ASSESSMENT & PLAN NOTE
Urinalysis positive for leukocytes, bacteria, blood, positive nitrites  Presented with altered mental status  No suprapubic tenderness  Met sepsis criteria  Plan  IV ceftriaxone  Follow-up urine culture  On the plan under sepsis

## 2024-10-27 NOTE — ASSESSMENT & PLAN NOTE
Patient has history on dysphagia, per chart review patient was on puréed diet with thin liquid  Nursing dysphagia screen was done with dysphagia diet-nurse report patient is unable to swallow  Plan  Will keep the patient n.p.o. for now  Follow-up on CT head for any acute intracranial changes  Speech and swallow evaluation

## 2024-10-27 NOTE — ASSESSMENT & PLAN NOTE
On admission, patient was in A-fib with RVR  Has been off Eliquis and Lopressor for the past 2 weeks   Received 1 dose of IV Lopressor 5 mg in the ED after which the heart rates back to normal limits  Plan  Restart Eliquis 2.5 mg twice daily and Lopressor 12.5 mg twice daily  Continue to monitor heart rates

## 2024-10-27 NOTE — ASSESSMENT & PLAN NOTE
Patient has history of dementia with behavioral disturbance  Per granddaughter patient was at baseline mental status until today morning.  Morning patient's mental status patient was not, started repeating words, worsening swallow difficulty  She has dysphagia and baseline oral intake has been very poor for the past 2 weeks, was just taking Ensure  In the ED, met sepsis criteria, with urinalysis suggestive of UTI, status post IV fluid resuscitation and IV cefepime  Acute encephalopathy possibly secondary to UTI or other infection versus worsening dementia versus stroke versus delirium  Plan  CT head to rule out stroke or other intracranial abnormality due to acute onset of dysphagia and repeat words  Treatment for UTI  Ammonia levels  TSH levels  Monitor electrolytes  Delirium precautions  Fall precautions  Consider geriatrics consult

## 2024-10-27 NOTE — DISCHARGE INSTR - DIET
Dysphagia 1 diet- puree solids, with NTL  Meds crushed/whole in puree as able.  Aspiration precautions, careful hand-feeding when participating

## 2024-10-27 NOTE — CONSULTS
Consultation - Surgery-General   Name: Grace Clayton 89 y.o. female I MRN: [id number]  Unit/Bed#: S -01 I Date of Admission: 10/26/2024   Date of Service: 10/27/2024 I Hospital Day: 1   Inpatient consult to Acute Care Surgery  Consult performed by: Eneida Hargrove MD  Consult ordered by: Zaria Andrews MD        Physician Requesting Evaluation: Zaria Andrews MD   Reason for Evaluation / Principal Problem: Sacral wound    Assessment & Plan  Sepsis (HCC)  Met sepsis criteria with leukocytosis 21 and lactate 2.8 with suspected UTI source as UA showed large leukocytes, bacteria, occult blood and positive nitrites.  - Cont abx  - Management per primary  Paroxysmal atrial fibrillation (HCC)  - Continue to hold Eliquis  UTI (urinary tract infection)  Suspected source of sepsis.  - Management per primary  Pressure ulcers of skin of multiple topographic sites  General surgery team consulted for sacral wound.    Plan:  - Bedside debridement performed  - Wound packed with kerlix and dressed with ABD  - Will plan for wound check in next 1-2 days  - Recommend prophylactic lovenox instead of therapeutic in setting of further debridement plans  Please contact the SecureChat role for the Surgery-General service with any questions/concerns.    History of Present Illness   Grace Clayton is a 89 y.o. female with dementia presenting from home with AMS, fatigue, poor intake. In ED met sepsis criteria with leukocytosis 21 and lactate 2.8 with suspected UTI source as UA showed large leukocytes, bacteria, occult blood and positive nitrites. She was admitted to medicine service. CT abd pelvis shows sacral osteomyelitis. She is on eliquis for afib which is currently held but she is currently on therapeutic lovenox. General surgery consulted for sacral wound.    Review of Systems   Unable to perform ROS: Dementia     I have reviewed the patient's PMH, PSH, Social History, Family History, Meds, and Allergies  Historical Information   Past  Medical History:   Diagnosis Date    Atrial fibrillation (HCC)     Dementia (HCC)     Hypertension      History reviewed. No pertinent surgical history.  Social History     Tobacco Use    Smoking status: Never    Smokeless tobacco: Never   Substance and Sexual Activity    Alcohol use: Never    Drug use: Never    Sexual activity: Not on file     E-Cigarette/Vaping     E-Cigarette/Vaping Substances     History reviewed. No pertinent family history.  Social History     Tobacco Use    Smoking status: Never    Smokeless tobacco: Never   Substance and Sexual Activity    Alcohol use: Never    Drug use: Never    Sexual activity: Not on file       Current Facility-Administered Medications:     [Held by provider] apixaban (ELIQUIS) tablet 2.5 mg, BID    ceftriaxone (ROCEPHIN) 1 g/50 mL in dextrose IVPB, Q24H, Last Rate: 1,000 mg (10/27/24 0904)    cyanocobalamin (VITAMIN B-12) tablet 1,000 mcg, Daily    enoxaparin (LOVENOX) subcutaneous injection 60 mg, Q12H ANY    metoprolol (LOPRESSOR) injection 5 mg, Q12H    [Held by provider] metoprolol tartrate (LOPRESSOR) partial tablet 12.5 mg, Q12H ANY    pantoprazole (PROTONIX) EC tablet 40 mg, QAM    potassium chloride (Klor-Con M20) CR tablet 40 mEq, Daily    potassium chloride 20 mEq IVPB (premix), Once, Last Rate: 20 mEq (10/27/24 1755)    [START ON 10/28/2024] senna-docusate sodium (SENOKOT S) 8.6-50 mg per tablet 1 tablet, HS  Prior to Admission Medications   Prescriptions Last Dose Informant Patient Reported? Taking?   Cyanocobalamin 1000 MCG CAPS 10/26/2024  Yes Yes   Sig: Take 1,000 mcg by mouth daily   Melatonin 5 MG/15ML LIQD Past Month  Yes Yes   Sig: Take 5 mg by mouth daily at bedtime   albuterol (2.5 mg/3 mL) 0.083 % nebulizer solution Not Taking  Yes No   Sig: Inhale 2.5 mg Three times daily as needed for wheezing   Patient not taking: Reported on 10/27/2024   apixaban (Eliquis) 2.5 mg Past Month Family Member Yes Yes   Sig: Take 2.5 mg by mouth 2 (two) times a day    budesonide (PULMICORT) 0.5 mg/2 mL nebulizer solution   No No   Sig: Take 2 mL (0.5 mg total) by nebulization every 12 (twelve) hours Rinse mouth after use.   dorzolamide-timolol (COSOPT) 2-0.5 % ophthalmic solution 10/26/2024  Yes Yes   Si drop 2 (two) times a day   escitalopram (LEXAPRO) 5 mg tablet   No No   Sig: Take 1 tablet (5 mg total) by mouth daily   famotidine (PEPCID) 10 mg tablet   No No   Sig: Take 1 tablet (10 mg total) by mouth daily   gabapentin (NEURONTIN) 100 mg capsule   No No   Sig: Take 1 capsule (100 mg total) by mouth 2 (two) times a day   latanoprost (XALATAN) 0.005 % ophthalmic solution Past Week  Yes Yes   Sig: Administer 1 drop to both eyes   metoprolol tartrate (LOPRESSOR) 25 mg tablet Past Month Family Member Yes Yes   Sig: Take 12.5 mg by mouth every 12 (twelve) hours   pantoprazole (PROTONIX) 40 mg tablet Past Month  Yes Yes   Sig: Take 40 mg by mouth every morning   pravastatin (PRAVACHOL) 20 mg tablet Past Month  Yes Yes   Sig: Take 20 mg by mouth daily   sodium phosphate 3 mmol/mL Past Month  Yes Yes   Sig: Insert 1 enema into the rectum daily as needed      Facility-Administered Medications: None     Penicillins    Objective :  Temp:  [97.3 °F (36.3 °C)-100.1 °F (37.8 °C)] 97.4 °F (36.3 °C)  HR:  [] 77  BP: (121-163)/(64-93) 138/82  Resp:  [17-18] 17  SpO2:  [92 %-98 %] 98 %  O2 Device: None (Room air)    Lines/Drains/Airways       Active Status       Name Placement date Placement time Site Days    Urethral Catheter Non-latex;Temperature probe 16 Fr. 10/26/24  2104  Non-latex;Temperature probe  less than 1                  Physical Exam  Constitutional:       Appearance: She is ill-appearing.   HENT:      Head: Normocephalic and atraumatic.      Right Ear: External ear normal.      Left Ear: External ear normal.      Nose: Nose normal.      Mouth/Throat:      Pharynx: Oropharynx is clear.   Eyes:      Extraocular Movements: Extraocular movements intact.    Cardiovascular:      Rate and Rhythm: Normal rate.   Pulmonary:      Effort: Pulmonary effort is normal.   Abdominal:      General: There is no distension.      Palpations: Abdomen is soft.      Tenderness: There is no abdominal tenderness.   Musculoskeletal:      Cervical back: Normal range of motion.      Right lower leg: No edema.      Left lower leg: No edema.   Skin:     General: Skin is warm and dry.      Comments: Sacral wound   Neurological:      Mental Status: Mental status is at baseline.   Psychiatric:         Mood and Affect: Mood normal.         Lab Results: I have reviewed the following results:  Recent Labs     10/26/24  1907 10/26/24  2112 10/26/24  2342 10/27/24  0000 10/27/24  0441 10/27/24  0912   WBC 21.23*  --   --   --  13.69*  --    HGB 9.7*  --   --   --  9.4*  --    HCT 32.4*  --   --   --  30.9*  --    *  --   --   --  363  --    SODIUM 142  --   --   --  145  --    K 3.5  --   --    < > 3.0*  --      --   --   --  101  --    CO2 26  --   --   --  35*  --    BUN 19  --   --   --  17  --    CREATININE 0.60  --   --   --  0.57*  --    GLUC 176*  --   --   --  160*  --    MG 2.0  --   --   --   --   --    PHOS 2.6  --   --   --   --   --    AST 27  --   --   --  14  --    ALT 5*  --   --   --  8  --    ALB 2.6*  --   --   --  2.5*  --    TBILI 0.84  --   --   --  0.68  --    ALKPHOS 71  --   --   --  63  --    HSTNI0 109*  --   --   --  121*  --    HSTNI2  --    < >  --   --   --  99*   *  --   --   --   --   --    LACTICACID 2.8*  --  2.0  --   --   --     < > = values in this interval not displayed.       Imaging Results Review: I reviewed radiology reports from this admission including: CT abdomen/pelvis.  Other Study Results Review: No additional pertinent studies reviewed.    VTE Pharmacologic Prophylaxis: VTE covered by:  [Held by provider] apixaban, Oral  enoxaparin, Subcutaneous, 60 mg at 10/27/24 4027     VTE Mechanical Prophylaxis: sequential compression  device

## 2024-10-27 NOTE — ASSESSMENT & PLAN NOTE
Patient has multiple pressure ulcers including a sacral wound  Does not appear infected  However presented with sepsis criteria likely secondary to UTI  Plan  On IV ceftriaxone currently for the UTI  Wound cultures  Wound care consult  Follow-up CT abdomen pelvis

## 2024-10-27 NOTE — PROCEDURES
Debridement    Universal Protocol:  procedure performed by consultantPatient identity confirmed: arm band    Debridement Details  Performed by: resident  Debridement type: surgical  Level of debridement: subcutaneous tissue      Post-debridement measurements  Length (cm): 5  Width (cm): 7  Depth (cm): 1  Percent debrided: 10%  Surface Area (cm^2): 35  Area Debrided (cm^2): 3.5  Volume (cm^3): 35    Tissue and other material debrided: subcutaneous tissue  Devitalized tissue debrided: exudate, fibrin and slough  Instrument(s) utilized: blade, forceps and scissors  Technique utilized: excisionalBleeding: small  Hemostasis obtained with: pressure  Debridement Comments: Surgical excision with blade and scissors used to cut away fibrinous slough and devitalized subcutaneous tissue down to level of healthy bleeding tissue. Wound debrided to level of subcutaneous tissue, final measurements 5 cm x 7 cm x 1 cm.

## 2024-10-28 PROBLEM — Z71.89 GOALS OF CARE, COUNSELING/DISCUSSION: Status: ACTIVE | Noted: 2024-10-28

## 2024-10-28 PROBLEM — Z51.5 PALLIATIVE CARE BY SPECIALIST: Status: ACTIVE | Noted: 2024-10-28

## 2024-10-28 PROBLEM — E44.0 MODERATE PROTEIN-CALORIE MALNUTRITION (HCC): Status: ACTIVE | Noted: 2024-10-28

## 2024-10-28 LAB
ANION GAP SERPL CALCULATED.3IONS-SCNC: 4 MMOL/L (ref 4–13)
ATRIAL RATE: 156 BPM
ATRIAL RATE: 97 BPM
BASOPHILS # BLD AUTO: 0.07 THOUSANDS/ΜL (ref 0–0.1)
BASOPHILS NFR BLD AUTO: 1 % (ref 0–1)
BUN SERPL-MCNC: 12 MG/DL (ref 5–25)
CALCIUM SERPL-MCNC: 8.2 MG/DL (ref 8.4–10.2)
CHLORIDE SERPL-SCNC: 102 MMOL/L (ref 96–108)
CO2 SERPL-SCNC: 39 MMOL/L (ref 21–32)
CREAT SERPL-MCNC: 0.52 MG/DL (ref 0.6–1.3)
EOSINOPHIL # BLD AUTO: 0.2 THOUSAND/ΜL (ref 0–0.61)
EOSINOPHIL NFR BLD AUTO: 2 % (ref 0–6)
ERYTHROCYTE [DISTWIDTH] IN BLOOD BY AUTOMATED COUNT: 18.6 % (ref 11.6–15.1)
GFR SERPL CREATININE-BSD FRML MDRD: 79 ML/MIN/1.73SQ M
GLUCOSE SERPL-MCNC: 96 MG/DL (ref 65–140)
HCT VFR BLD AUTO: 30.2 % (ref 34.8–46.1)
HEMOCCULT STL QL: NEGATIVE
HGB BLD-MCNC: 9.2 G/DL (ref 11.5–15.4)
IMM GRANULOCYTES # BLD AUTO: 0.11 THOUSAND/UL (ref 0–0.2)
IMM GRANULOCYTES NFR BLD AUTO: 1 % (ref 0–2)
LYMPHOCYTES # BLD AUTO: 1.85 THOUSANDS/ΜL (ref 0.6–4.47)
LYMPHOCYTES NFR BLD AUTO: 14 % (ref 14–44)
MAGNESIUM SERPL-MCNC: 2.1 MG/DL (ref 1.9–2.7)
MCH RBC QN AUTO: 30 PG (ref 26.8–34.3)
MCHC RBC AUTO-ENTMCNC: 30.5 G/DL (ref 31.4–37.4)
MCV RBC AUTO: 98 FL (ref 82–98)
MONOCYTES # BLD AUTO: 0.86 THOUSAND/ΜL (ref 0.17–1.22)
MONOCYTES NFR BLD AUTO: 6 % (ref 4–12)
NEUTROPHILS # BLD AUTO: 10.48 THOUSANDS/ΜL (ref 1.85–7.62)
NEUTS SEG NFR BLD AUTO: 76 % (ref 43–75)
NRBC BLD AUTO-RTO: 0 /100 WBCS
PLATELET # BLD AUTO: 363 THOUSANDS/UL (ref 149–390)
PMV BLD AUTO: 9.8 FL (ref 8.9–12.7)
POTASSIUM SERPL-SCNC: 2.9 MMOL/L (ref 3.5–5.3)
QRS AXIS: 82 DEGREES
QRS AXIS: 94 DEGREES
QRSD INTERVAL: 100 MS
QRSD INTERVAL: 106 MS
QT INTERVAL: 344 MS
QT INTERVAL: 376 MS
QTC INTERVAL: 503 MS
QTC INTERVAL: 554 MS
RBC # BLD AUTO: 3.07 MILLION/UL (ref 3.81–5.12)
SODIUM SERPL-SCNC: 145 MMOL/L (ref 135–147)
T WAVE AXIS: -23 DEGREES
T WAVE AXIS: -4 DEGREES
VENTRICULAR RATE: 108 BPM
VENTRICULAR RATE: 156 BPM
WBC # BLD AUTO: 13.57 THOUSAND/UL (ref 4.31–10.16)

## 2024-10-28 PROCEDURE — 82272 OCCULT BLD FECES 1-3 TESTS: CPT

## 2024-10-28 PROCEDURE — 85025 COMPLETE CBC W/AUTO DIFF WBC: CPT | Performed by: INTERNAL MEDICINE

## 2024-10-28 PROCEDURE — 80048 BASIC METABOLIC PNL TOTAL CA: CPT | Performed by: INTERNAL MEDICINE

## 2024-10-28 PROCEDURE — 99223 1ST HOSP IP/OBS HIGH 75: CPT | Performed by: STUDENT IN AN ORGANIZED HEALTH CARE EDUCATION/TRAINING PROGRAM

## 2024-10-28 PROCEDURE — 93010 ELECTROCARDIOGRAM REPORT: CPT | Performed by: INTERNAL MEDICINE

## 2024-10-28 PROCEDURE — 87081 CULTURE SCREEN ONLY: CPT

## 2024-10-28 PROCEDURE — 99232 SBSQ HOSP IP/OBS MODERATE 35: CPT | Performed by: INTERNAL MEDICINE

## 2024-10-28 PROCEDURE — 83735 ASSAY OF MAGNESIUM: CPT

## 2024-10-28 RX ORDER — PANTOPRAZOLE SODIUM 40 MG/10ML
40 INJECTION, POWDER, LYOPHILIZED, FOR SOLUTION INTRAVENOUS ONCE
Status: COMPLETED | OUTPATIENT
Start: 2024-10-28 | End: 2024-10-28

## 2024-10-28 RX ORDER — ACETAMINOPHEN 10 MG/ML
1000 INJECTION, SOLUTION INTRAVENOUS EVERY 6 HOURS PRN
Status: DISCONTINUED | OUTPATIENT
Start: 2024-10-28 | End: 2024-10-30

## 2024-10-28 RX ORDER — POTASSIUM CHLORIDE 14.9 MG/ML
20 INJECTION INTRAVENOUS
Status: DISCONTINUED | OUTPATIENT
Start: 2024-10-28 | End: 2024-10-28

## 2024-10-28 RX ORDER — DEXTROSE MONOHYDRATE, SODIUM CHLORIDE, AND POTASSIUM CHLORIDE 50; 2.98; 9 G/1000ML; G/1000ML; G/1000ML
75 INJECTION, SOLUTION INTRAVENOUS CONTINUOUS
Status: DISPENSED | OUTPATIENT
Start: 2024-10-28 | End: 2024-10-29

## 2024-10-28 RX ADMIN — METOROPROLOL TARTRATE 5 MG: 5 INJECTION, SOLUTION INTRAVENOUS at 14:49

## 2024-10-28 RX ADMIN — METOROPROLOL TARTRATE 5 MG: 5 INJECTION, SOLUTION INTRAVENOUS at 02:44

## 2024-10-28 RX ADMIN — CEFTRIAXONE 1000 MG: 2 INJECTION, POWDER, FOR SOLUTION INTRAMUSCULAR; INTRAVENOUS at 08:40

## 2024-10-28 RX ADMIN — POTASSIUM CHLORIDE 20 MEQ: 200 INJECTION, SOLUTION INTRAVENOUS at 09:37

## 2024-10-28 RX ADMIN — PANTOPRAZOLE SODIUM 40 MG: 40 INJECTION, POWDER, FOR SOLUTION INTRAVENOUS at 22:35

## 2024-10-28 RX ADMIN — SENNOSIDES AND DOCUSATE SODIUM 1 TABLET: 8.6; 5 TABLET ORAL at 22:35

## 2024-10-28 RX ADMIN — ENOXAPARIN SODIUM 60 MG: 60 INJECTION SUBCUTANEOUS at 02:44

## 2024-10-28 RX ADMIN — ENOXAPARIN SODIUM 60 MG: 60 INJECTION SUBCUTANEOUS at 22:36

## 2024-10-28 RX ADMIN — POTASSIUM CHLORIDE, DEXTROSE MONOHYDRATE AND SODIUM CHLORIDE 75 ML/HR: 300; 5; 900 INJECTION, SOLUTION INTRAVENOUS at 11:48

## 2024-10-28 RX ADMIN — ACETAMINOPHEN 1000 MG: 10 INJECTION INTRAVENOUS at 15:35

## 2024-10-28 RX ADMIN — ENOXAPARIN SODIUM 60 MG: 60 INJECTION SUBCUTANEOUS at 08:44

## 2024-10-28 RX ADMIN — CEFEPIME 1000 MG: 1 INJECTION, POWDER, FOR SOLUTION INTRAMUSCULAR; INTRAVENOUS at 16:03

## 2024-10-28 NOTE — MALNUTRITION/BMI
This medical record reflects one or more clinical indicators suggestive of malnutrition and/or morbid obesity.    Malnutrition Findings:   Adult Malnutrition type: Acute illness  Adult Degree of Malnutrition: Malnutrition of moderate degree  Malnutrition Characteristics: Fat loss, Muscle loss                360 Statement: Moderate malnutrition related to inadequate oral intake as evidenced by subcutaneous fat and muscle loss extremities, temples, orbitals; < 50 -75% estimated nutritional needs. Currently treated with oral supplementation.    BMI Findings:           Body mass index is 24.72 kg/m².     See Nutrition note dated 10/28/2024 for additional details.  Completed nutrition assessment is viewable in the nutrition documentation.

## 2024-10-28 NOTE — ASSESSMENT & PLAN NOTE
Admitted for worsening AMS, decreased PO intake and sepsis 2/2 UTI  Received fluids in the ED, currently on IV antibiotics  Management per primary

## 2024-10-28 NOTE — ASSESSMENT & PLAN NOTE
Urinalysis positive for leukocytes, bacteria, blood, positive nitrites  Presented with altered mental status  No suprapubic tenderness  Met sepsis criteria  Urine culture grows 80,000-89,000 CFU per mL Citrobacter freundii and 40,000-49,000 CFU per mL Pseudomonas aeruginosa  Oliguria urine output of 300 mL in the past 24 hours(expected urine output 720 mL/day)    Plan  IV cefepime 1 g daily  On the plan under sepsis

## 2024-10-28 NOTE — CASE MANAGEMENT
Case Management Assessment & Discharge Planning Note    Patient name Grace Clayton  Location S /S -01 MRN [id number]  : 1925 Date 10/28/2024       Current Admission Date: 10/26/2024  Current Admission Diagnosis:Sepsis (HCC)   Patient Active Problem List    Diagnosis Date Noted Date Diagnosed    Palliative care by specialist 10/28/2024     Goals of care, counseling/discussion 10/28/2024     Moderate protein-calorie malnutrition (HCC) 10/28/2024     Sepsis (HCC) 10/27/2024     Elevated troponin 10/27/2024     UTI (urinary tract infection) 10/27/2024     Pressure ulcers of skin of multiple topographic sites 10/27/2024     Acute encephalopathy 10/27/2024     Dysphagia 10/27/2024     Failure to thrive in adult 10/27/2024     Anemia 01/15/2024     Suspected dementia with agitation (HCC) 2024     RSV infection with wrosening cough 2024     GERD (gastroesophageal reflux disease) 2024     Non-MI troponin elevation 2024     Generalized weakness 2023     Paroxysmal atrial fibrillation (HCC) 2023     Ambulatory dysfunction 2023     Anxiety 2023     Hyperlipidemia 2012     Essential hypertension 2012       LOS (days): 2  Geometric Mean LOS (GMLOS) (days): 5  Days to GMLOS:3.2     OBJECTIVE:    Risk of Unplanned Readmission Score: 17.93         Current admission status: Inpatient       Preferred Pharmacy:   [id number] [address] [address]  Phone: [phone] Fax: [phone]    Primary Care Provider: No primary care provider on file.    Primary Insurance: MEDICARE  Secondary Insurance: UNITED [employer] INSURANCE    ASSESSMENT:  Active Health Care Proxies    There are no active Health Care Proxies on file.                      Patient Information  Admitted from:: Home  Mental Status: Confused  During Assessment patient was accompanied by: Not accompanied during  assessment  Assessment information provided by:: Other - please comment (Grand daughter)  Primary Caregiver: Family  Caregiver's Name:: Molly Braden  Caregiver's Relationship to Patient:: Family Member  Caregiver's Telephone Number:: See face sheet  Support Systems: Family members  Home entry access options. Select all that apply.: Stairs  Number of steps to enter home.: 5  Do the steps have railings?: Yes  Type of Current Residence: [facility]  Living Arrangements: Lives w/ Extended Family    Activities of Daily Living Prior to Admission  Functional Status: Total dependent  Completes ADLs independently?: No  Level of ADL dependence: Total Dependent  Ambulates independently?: No  Level of ambulatory dependence: Total Dependent  Does patient use assisted devices?: Yes  Assisted Devices (DME) used: Hospital Bed  Does patient currently own DME?: Yes  What DME does the patient currently own?: Hospital Bed  Does patient have a history of Outpatient Therapy (PT/OT)?: No  Does the patient have a history of Short-Term Rehab?: No  Does patient have a history of HHC?: Yes  Does patient currently have HHC?: Yes    Current Home Health Care  Type of Current Home Care Services: Home health aide, Home PT, Home OT, Nurse visit  Home Health Agency Name:: [facility]  Current Home Health Follow-Up Provider:: PCP    Patient Information Continued  Income Source: Pension/long term  Does patient have prescription coverage?: Yes  Does patient receive dialysis treatments?: No  Does patient have a history of substance abuse?: No  Does patient have a history of Mental Health Diagnosis?: No         Means of Transportation  Means of Transport to Appts:: Other (Comment) (Ambulance transport)          DISCHARGE DETAILS:    Discharge planning discussed with:: Patients grand daughter- Molly  Freedom of Choice: Yes  Comments - Freedom of Choice: Cm called patients grand daughter to review Cm role. Patient from home and dependent at baseline. Patient is  bed bound. Patient receives home nursing for wound care through [facility]. Patients grand daughter would like to continue working with them. CM submitted referral for [facility] through [facility].  CM contacted family/caregiver?: Yes  Were Treatment Team discharge recommendations reviewed with patient/caregiver?: Yes  Did patient/caregiver verbalize understanding of patient care needs?: Yes  Were patient/caregiver advised of the risks associated with not following Treatment Team discharge recommendations?: Yes    Contacts  Patient Contacts: Molly ([facility])  Relationship to Patient:: Family  Contact Method: Phone  Phone Number: [phone]  Reason/Outcome: Discharge Planning, Emergency Contact    Requested Home Health Care         Is the patient interested in [facility] at discharge?: Yes  Home Health Discipline requested:: Nursing  [facility] Reason (only applicable if external [facility] name selected): Patient has established relationship with provider  Home Health Follow-Up Provider:: PCP  Home Health Services Needed:: Evaluate Functional Status and Safety, Wound/Ostomy Care  Homebound Criteria Met:: Uses an Assist Device (i.e. cane, walker, etc), Requires the Assistance of Another Person for Safe Ambulation or to Leave the Home, Requires Medical Transportation  Supporting Clincal Findings:: Bed Bound or Wheelchair Bound, Cognitive Deficit Requiring the Assistance of Others    DME Referral Provided  Referral made for DME?: No    Other Referral/Resources/Interventions Provided:  Referral Comments: [facility]- [facility] referral with [facility] sent in [facility]

## 2024-10-28 NOTE — ASSESSMENT & PLAN NOTE
Malnutrition Findings:   Adult Malnutrition type: Acute illness  Adult Degree of Malnutrition: Malnutrition of moderate degree  Malnutrition Characteristics: Fat loss, Muscle loss                  360 Statement: Moderate malnutrition related to inadequate oral intake as evidenced by subcutaneous fat and muscle loss extremities, temples, orbitals; < 50 -75% estimated nutritional needs. Currently treated with oral supplementation.    BMI Findings:           Body mass index is 24.72 kg/m².

## 2024-10-28 NOTE — DISCHARGE INSTR - OTHER ORDERS
Skin care plans:  1-Hydraguard to bilateral heels BID and PRN  2-Elevate heels to offload pressure.  3-Ehob cushion in chair when out of bed.  4-Moisturize skin daily with skin nourishing cream.  5-Turn/reposition q2h for pressure re-distribution on skin.  6- B/L arms:Irrigate with NSS. Pat dry. Apply Dermagran directly to wound bed. Cover with ABD. Wrap with Atilio. Secure with tape; do no apply tape directly to skin. Change daily or PRN for soilage/dislodgement.    7-R lower leg: irrigate with NSS. Pat dry. Cover with silicone foam dressing. Change every other day  8- Low air loss mattress  9- Schedule Follow-up Outpatient Wound Appointment. Ambulatory Referral Placed.   Call Outpatient Wound and Ostomy Care Team @ [phone]   Option 1: [place], [place], [place], [place]  Option 2: [facility], [place]

## 2024-10-28 NOTE — QUICK NOTE
Spoke with patient's POA Molly (granddaughter) regarding bedside debridement performed by general surgery on 10/27. Updated her on the on going wound care and the recommendations of diligent wound care, nutritional supplements for increased protein and frequent turnings/off-loading pressure to promote wound healing.      All questions answered.    Daphne Clarke  10/28/24

## 2024-10-28 NOTE — ASSESSMENT & PLAN NOTE
Decreased PO intake prior to admission  Low protein and albumin levels  Cleared by speech therapy for diet, requires max assist for feeds  Nutrition consult placed

## 2024-10-28 NOTE — PLAN OF CARE
Problem: Prexisting or High Potential for Compromised Skin Integrity  Goal: Skin integrity is maintained or improved  Description: INTERVENTIONS:  - Identify patients at risk for skin breakdown  - Assess and monitor skin integrity  - Assess and monitor nutrition and hydration status  - Monitor labs   - Assess for incontinence   - Turn and reposition patient  - Assist with mobility/ambulation  - Relieve pressure over bony prominences  - Avoid friction and shearing  - Provide appropriate hygiene as needed including keeping skin clean and dry  - Evaluate need for skin moisturizer/barrier cream  - Collaborate with interdisciplinary team   - Patient/family teaching  - Consider wound care consult   Outcome: Progressing     Problem: GENITOURINARY - ADULT  Goal: Maintains or returns to baseline urinary function  Description: INTERVENTIONS:  - Assess urinary function  - Encourage oral fluids to ensure adequate hydration if ordered  - Administer IV fluids as ordered to ensure adequate hydration  - Administer ordered medications as needed  - Offer frequent toileting  - Follow urinary retention protocol if ordered  Outcome: Progressing  Goal: Urinary catheter remains patent  Description: INTERVENTIONS:  - Assess patency of urinary catheter  - If patient has a chronic carcamo, consider changing catheter if non-functioning  - Follow guidelines for intermittent irrigation of non-functioning urinary catheter  Outcome: Progressing     Problem: INFECTION - ADULT  Goal: Absence or prevention of progression during hospitalization  Description: INTERVENTIONS:  - Assess and monitor for signs and symptoms of infection  - Monitor lab/diagnostic results  - Monitor all insertion sites, i.e. indwelling lines, tubes, and drains  - Monitor endotracheal if appropriate and nasal secretions for changes in amount and color  - [place] appropriate cooling/warming therapies per order  - Administer medications as ordered  - Instruct and encourage  patient and family to use good hand hygiene technique  - Identify and instruct in appropriate isolation precautions for identified infection/condition  Outcome: Progressing  Goal: Absence of fever/infection during neutropenic period  Description: INTERVENTIONS:  - Monitor WBC    Outcome: Progressing

## 2024-10-28 NOTE — CASE MANAGEMENT
Case Management Progress Note    Patient name Grace García S /S -01 MRN [id number]  : 1925 Date 10/28/2024       LOS (days): 2  Geometric Mean LOS (GMLOS) (days): 5  Days to GMLOS:3.2        OBJECTIVE:        Current admission status: Inpatient  Preferred [facility]acy:   [id number] [address] [address]  Phone: [phone] Fax: [phone]    Primary Care Provider: No primary care provider on file.    Primary Insurance: MEDICARE  Secondary Insurance: UNITED [employer] INSURANCE    PROGRESS NOTE:      Cm attempted Pc to patients granddaughter Molly, there was no response. Cm to try again later when able.

## 2024-10-28 NOTE — ASSESSMENT & PLAN NOTE
General surgery consulted, completed bedside debridement on 10/27  Surgery will continue to follow

## 2024-10-28 NOTE — ASSESSMENT & PLAN NOTE
Met sepsis criteria with tachycardia, leukocytosis of 21K, lactic acidosis and suspected source of infection being urinary tract infection  Status post 1.5 L bolus of Isolyte in the ED  Received 1 dose of IV cefepime in the ED  She has multiple pressure wounds including a sacral ulcers-does not look infected at this time  Sacral decubitus ulcer. Cortical bony erosion consistent with osteomyelitis of the lower sacrum and upper coccyx.  Moderate bilateral pleural effusions with overlying compressive atelectasis.  Constipation with very large bolus of stool in the rectum and mild stercoral proctitis.  Blood cultures no growth at 24 hours.       Plan  Gentle IV hydration  Switch from IV ceftriaxone to IV cefepime 10/28.  Follow-up on wound cultures  Follow-up on blood cultures

## 2024-10-28 NOTE — ASSESSMENT & PLAN NOTE
Palliative Diagnosis: Dementia, failure to thrive    Social Support:  Supportive listening provided  Normalized experience of patient and family  Provided anxiety containment  Advocated for patient/family with interdisciplinary team  Mediated conflict    Care Coordination  Case discussed with primary team    Follow-up  We appreciate the opportunity to participate in this patient's care.   We will continue to follow while admitted.    Please do not hesitate to contact our on-call provider through [facility] Secure Chat or contact [phone] should there be an acute change or other symptom control concerns.

## 2024-10-28 NOTE — PROGRESS NOTES
Progress Note - Hospitalist   Name: Grace Clayton 89 y.o. female I MRN: [id number]  Unit/Bed#: S -01 I Date of Admission: 10/26/2024   Date of Service: 10/28/2024 I Hospital Day: 2    Assessment & Plan  Sepsis (HCC)  Met sepsis criteria with tachycardia, leukocytosis of 21K, lactic acidosis and suspected source of infection being urinary tract infection  Status post 1.5 L bolus of Isolyte in the ED  Received 1 dose of IV cefepime in the ED  She has multiple pressure wounds including a sacral ulcers-does not look infected at this time  Sacral decubitus ulcer. Cortical bony erosion consistent with osteomyelitis of the lower sacrum and upper coccyx.  Moderate bilateral pleural effusions with overlying compressive atelectasis.  Constipation with very large bolus of stool in the rectum and mild stercoral proctitis.  Blood cultures no growth at 24 hours.       Plan  Gentle IV hydration  Switch from IV ceftriaxone to IV cefepime 10/28.  Follow-up on wound cultures  Follow-up on blood cultures    UTI (urinary tract infection)  Urinalysis positive for leukocytes, bacteria, blood, positive nitrites  Presented with altered mental status  No suprapubic tenderness  Met sepsis criteria  Urine culture grows 80,000-89,000 CFU per mL Citrobacter freundii and 40,000-49,000 CFU per mL Pseudomonas aeruginosa  Oliguria urine output of 300 mL in the past 24 hours(expected urine output 720 mL/day)    Plan  IV cefepime 1 g daily  On the plan under sepsis  Acute encephalopathy  Patient has history of dementia with behavioral disturbance  Per granddaughter patient was at baseline mental status until today morning.  Morning patient's mental status patient was not, started repeating words, worsening swallow difficulty  She has dysphagia and baseline oral intake has been very poor for the past 2 weeks, was just taking Ensure  In the ED, met sepsis criteria, with urinalysis suggestive of UTI, status post IV fluid resuscitation and IV  cefepime  Acute encephalopathy possibly secondary to UTI or other infection versus worsening dementia versus stroke versus delirium  Plan  CT head to rule out stroke or other intracranial abnormality due to acute onset of dysphagia and repeat words  Treatment for UTI  Ammonia levels  Monitor electrolytes  Delirium precautions  Fall precautions  Consider geriatrics consult  Hyperlipidemia  History of hyperlipidemia, patient was supposed to be on pravastatin 20 mg daily-has not taken these medications for the past 2 weeks  Essential hypertension  History of hypertension and A-fib, supposed to be on Lopressor 12.5 mg twice daily-has not been taking this medication for the past 2 weeks  We will continue Lopressor 12.5 mg daily  Paroxysmal atrial fibrillation (HCC)  On admission, patient was in A-fib with RVR  Has been off Eliquis and Lopressor for the past 2 weeks   Received 1 dose of IV Lopressor 5 mg in the ED after which the heart rates back to normal limits  Plan  Restart Eliquis 2.5 mg twice daily and Lopressor 12.5 mg twice daily  Continue to monitor heart rates  Elevated troponin  Patient presented with altered mental status and poor oral intake  No chest pain  Troponin levels in the ED elevated at-0 hour at 109, 2 hr at 101 ,4 hr 113  and delta-29  No ischemic changes on the EKG  BNP at 591 but patient looks very dry and no signs of volume overload-last echo in February 2024 showed ejection fraction of 70% days with normal systolic function, mild concentric hypertrophy  Has had a history of non MI troponin elevation in the past  Likely related to A-fib with RVR causing non-MI troponin elevation  Plan  Continue to monitor heart rates and keep the patient on telemetry   monitor for symptoms    Ambulatory dysfunction  Bedbound  PT/OT evaluation  Pressure ulcers of skin of multiple topographic sites  Patient has multiple pressure ulcers including a sacral wound  Does not appear infected  However presented with  sepsis criteria likely secondary to UTI  Plan  On IV ceftriaxone currently for the UTI  Wound cultures  Wound care consult  Follow-up CT abdomen pelvis  Dysphagia  Patient has history on dysphagia, per chart review patient was on puréed diet with thin liquid  Nursing dysphagia screen was done with dysphagia diet-nurse report patient is unable to swallow  Plan  Will keep the patient n.p.o. for now  Follow-up on CT head for any acute intracranial changes  Speech and swallow evaluation  Failure to thrive in adult  Daughter reports that the patient has been having poor oral intake for the past 2 weeks and has been weak and fatigued  She has been just drinking her Ensure but with poor oral intake  History of dysphagia  BMI within normal limits but very dry on physical exam  Plan   nutrition supplements   speech and swallow evaluation  Inpatient consult to nutrition for further recommendations    VTE Pharmacologic Prophylaxis: VTE Score: 5 High Risk (Score >/= 5) - Pharmacological DVT Prophylaxis Ordered: enoxaparin (Lovenox). Sequential Compression Devices Ordered.    Mobility:   Basic Mobility Inpatient Raw Score: 6  JH-HLM Goal: 2: Bed activities/Dependent transfer  JH-HLM Achieved: 1: Laying in bed  JH-HLM Goal achieved. Continue to encourage appropriate mobility.    Patient Centered Rounds: I performed bedside rounds with nursing staff today.   Discussions with Specialists or Other Care Team Provider: Attending physician    Education and Discussions with Family / Patient: Updated  (granddaughter) via phone.    Current Length of Stay: 2 day(s)  Current Patient Status: Inpatient   Certification Statement: The patient will continue to require additional inpatient hospital stay due to unresolved sepsis  Discharge Plan: Anticipate discharge in 48-72 hrs to rehab facility.    Code Status: Level 3 - DNAR and DNI    Subjective   Patient seen and examined today, not in distress.    Objective :  Temp:  [97.1 °F  (36.2 °C)-98.5 °F (36.9 °C)] 97.1 °F (36.2 °C)  HR:  [] 100  BP: (121-150)/() 121/73  Resp:  [16-18] 17  SpO2:  [93 %-96 %] 96 %  O2 Device: None (Room air)    Body mass index is 24.72 kg/m².     Input and Output Summary (last 24 hours):     Intake/Output Summary (Last 24 hours) at 10/28/2024 1458  Last data filed at 10/28/2024 1200  Gross per 24 hour   Intake 240 ml   Output 300 ml   Net -60 ml       Physical Exam  Vitals and nursing note reviewed.   Constitutional:       General: She is not in acute distress.     Appearance: She is well-developed.   HENT:      Head: Normocephalic and atraumatic.   Eyes:      Conjunctiva/sclera: Conjunctivae normal.   Cardiovascular:      Rate and Rhythm: Normal rate and regular rhythm.      Heart sounds: No murmur heard.  Pulmonary:      Effort: Pulmonary effort is normal. No respiratory distress.      Breath sounds: Normal breath sounds.   Abdominal:      Palpations: Abdomen is soft.      Tenderness: There is no abdominal tenderness.   Musculoskeletal:         General: No swelling.      Cervical back: Neck supple.   Skin:     General: Skin is warm and dry.      Capillary Refill: Capillary refill takes less than 2 seconds.      Comments: Sacral ulcer   Neurological:      Mental Status: She is alert.   Psychiatric:         Mood and Affect: Mood normal.           Lines/Drains:  Lines/Drains/Airways       Active Status       Name Placement date Placement time Site Days    Urethral Catheter Non-latex;Temperature probe 16 Fr. 10/26/24  2104  Non-latex;Temperature probe  1                  Urinary Catheter:  Goal for removal: N/A - Chronic Chung                 Lab Results: I have reviewed the following results:   Results from last 7 days   Lab Units 10/28/24  0610   WBC Thousand/uL 13.57*   HEMOGLOBIN g/dL 9.2*   HEMATOCRIT % 30.2*   PLATELETS Thousands/uL 363   SEGS PCT % 76*   LYMPHO PCT % 14   MONO PCT % 6   EOS PCT % 2     Results from last 7 days   Lab Units  10/28/24  0610 10/27/24  0441   SODIUM mmol/L 145 145   POTASSIUM mmol/L 2.9* 3.0*   CHLORIDE mmol/L 102 101   CO2 mmol/L 39* 35*   BUN mg/dL 12 17   CREATININE mg/dL 0.52* 0.57*   ANION GAP mmol/L 4 9   CALCIUM mg/dL 8.2* 8.1*   ALBUMIN g/dL  --  2.5*   TOTAL BILIRUBIN mg/dL  --  0.68   ALK PHOS U/L  --  63   ALT U/L  --  8   AST U/L  --  14   GLUCOSE RANDOM mg/dL 96 160*                 Results from last 7 days   Lab Units 10/27/24  0441 10/27/24  0132 10/26/24  2342 10/26/24  1907   LACTIC ACID mmol/L  --   --  2.0 2.8*   PROCALCITONIN ng/ml 0.22 0.20  --   --        Recent Cultures (last 7 days):   Results from last 7 days   Lab Units 10/26/24  2233 10/26/24  1907   BLOOD CULTURE   --  No Growth at 24 hrs.   URINE CULTURE  80,000-89,000 cfu/ml Citrobacter freundii*  40,000-49,000 cfu/ml Pseudomonas aeruginosa*  --              Last 24 Hours Medication List:     Current Facility-Administered Medications:     acetaminophen (Ofirmev) injection 1,000 mg, Q6H PRN    [Held by provider] apixaban (ELIQUIS) tablet 2.5 mg, BID    ceftriaxone (ROCEPHIN) 1 g/50 mL in dextrose IVPB, Q24H, Last Rate: 1,000 mg (10/28/24 0840)    cyanocobalamin (VITAMIN B-12) tablet 1,000 mcg, Daily    dextrose 5 % and sodium chloride 0.9 % with KCl 40 mEq/L infusion (premix), Continuous, Last Rate: 75 mL/hr (10/28/24 1148)    enoxaparin (LOVENOX) subcutaneous injection 60 mg, Q12H ANY    metoprolol (LOPRESSOR) injection 5 mg, Q12H    [Held by provider] metoprolol tartrate (LOPRESSOR) partial tablet 12.5 mg, Q12H ANY    pantoprazole (PROTONIX) EC tablet 40 mg, QAM    senna-docusate sodium (SENOKOT S) 8.6-50 mg per tablet 1 tablet, HS    Administrative Statements   Today, Patient Was Seen By: Maxine Cerna MD      **Please Note: This note may have been constructed using a voice recognition system.**

## 2024-10-28 NOTE — ASSESSMENT & PLAN NOTE
Patient has history of dementia with behavioral disturbance  Per granddaughter patient was at baseline mental status until today morning.  Morning patient's mental status patient was not, started repeating words, worsening swallow difficulty  She has dysphagia and baseline oral intake has been very poor for the past 2 weeks, was just taking Ensure  In the ED, met sepsis criteria, with urinalysis suggestive of UTI, status post IV fluid resuscitation and IV cefepime  Acute encephalopathy possibly secondary to UTI or other infection versus worsening dementia versus stroke versus delirium  Plan  CT head to rule out stroke or other intracranial abnormality due to acute onset of dysphagia and repeat words  Treatment for UTI  Ammonia levels  Monitor electrolytes  Delirium precautions  Fall precautions  Consider geriatrics consult

## 2024-10-28 NOTE — WOUND OSTOMY CARE
Consult Note - Wound   Grace Clayton 89 y.o. female MRN: [id number]  Unit/Bed#: S -01 Encounter: [id number]        History and Present Illness:  Patient is a 89 yo female that was admitted to   for treatment of sepsis. Patient has a PMH of afib , dementia with agitation, sacral wound, GERD, hypertension, hyperlipidemia, anxiety. Patient is not alert and oriented, and does not follow commands. Patient is assist of two for turning and repositioning, dependent for care, feed for meals; nutrition supplements in place. Patient is incontinent of bowel and has indwelling catheter in place for urine management. On assessment, patient is in bed with pillows and wedges in place for offloading, true greg boots to bilateral heels.    Wound Care was consulted for multiple wounds.     Assessment Findings:   B/L heels are dry intact and artemio with no skin loss or wounds present. Recommend preventative Hydraguard Cream and proper offloading/ repositioning.  Continue to offload with true greg boots.     POA Unstageable Sacral Pressure Injury - debrided by surgery 10/27. Wound care orders per general surgery.     Right lower arm skin tear - irregular shaped area of partial thickness skin loss, skin flap re-approximated to wound bed. Wound bed is pink fragile tissue with scant amount of serous drainage. Leonor wound is hyperpigmented and fragile. Dermagran and silicone foam dressing applied. Etiology unclear.     Right lower leg, anterior skin tear - small irregular shaped aspect of skin loss. Wound bed is 100% pink. Scant amount of serous drainage noted. Leonor wound is hyperpigmented and fragile. Silicone foam dressing applied. Etiology unclear.     No induration, fluctuance, odor, warmth/temperature differences, redness, or purulence noted to the above noted wounds and skin areas assessed. New dressings applied per orders listed below. Patient tolerated well- no s/s of non-verbal pain or discomfort observed during the encounter.  Bedside nurse aware of plan of care. See flow sheets for more detailed assessment findings.      Orders listed below and wound care will continue to follow, call or Secure Chat with questions.     Skin care plans:  1-Hydraguard to bilateral heels BID and PRN  2-Elevate heels to offload pressure.  3-Ehob cushion in chair when out of bed.  4-Moisturize skin daily with skin nourishing cream.  5-Turn/reposition q2h for pressure re-distribution on skin.  6- R arm: irrigate with NSS. Pat dry. Apply Dermagran to wound bed. Cover with silicone foam dressing. Change every other day.   7-R lower leg: irrigate with NSS. Pat dry. Cover with silicone foam dressing. Change every other day  8- Low air loss mattress    Wounds:    Wound 10/27/24 Abrasion(s) Pretibial Distal;Right (Active)   Wound Image   10/28/24 1049   Wound Description Pink;Fragile 10/28/24 1049   Leonor-wound Assessment Hyperpigmented;Fragile 10/28/24 1049   Wound Length (cm) 0.5 cm 10/28/24 1049   Wound Width (cm) 0.5 cm 10/28/24 1049   Wound Depth (cm) 0.1 cm 10/28/24 1049   Wound Surface Area (cm^2) 0.25 cm^2 10/28/24 1049   Wound Volume (cm^3) 0.025 cm^3 10/28/24 1049   Calculated Wound Volume (cm^3) 0.03 cm^3 10/28/24 1049   Drainage Amount Scant 10/28/24 1049   Drainage Description Serous 10/28/24 1049   Non-staged Wound Description Partial thickness 10/28/24 1049   Treatments Cleansed;Site care 10/28/24 1049   Dressing Foam, Silicon (eg. Allevyn, etc) 10/28/24 1049   Wound packed? No 10/28/24 1049   Packing- # removed 0 10/28/24 1049   Packing- # inserted 0 10/28/24 1049   Dressing Changed New 10/28/24 1049   Patient Tolerance Tolerated well 10/28/24 1049   Dressing Status Clean;Dry;Intact 10/28/24 1049       Wound 10/27/24 Skin tear Arm Anterior;Right;Lower (Active)   Wound Image   10/28/24 1045   Wound Description Drainage;Fragile 10/28/24 1045   Leonor-wound Assessment Hyperpigmented;Pink 10/28/24 1045   Wound Length (cm) 4 cm 10/28/24 1045   Wound Width  (cm) 2 cm 10/28/24 1045   Wound Depth (cm) 0.2 cm 10/28/24 1045   Wound Surface Area (cm^2) 8 cm^2 10/28/24 1045   Wound Volume (cm^3) 1.6 cm^3 10/28/24 1045   Calculated Wound Volume (cm^3) 1.6 cm^3 10/28/24 1045   Wound Site Closure Approximated 10/28/24 1045   Drainage Amount Small 10/28/24 1045   Drainage Description Serous 10/28/24 1045   Non-staged Wound Description Partial thickness 10/28/24 1045   Treatments Cleansed;Site care 10/28/24 1045   Dressing Dermagran gauze;Foam, Silicon (eg. Allevyn, etc) 10/28/24 1045   Wound packed? No 10/28/24 1045   Packing- # removed 0 10/28/24 1045   Packing- # inserted 0 10/28/24 1045   Dressing Changed New 10/28/24 1045   Patient Tolerance Tolerated well 10/28/24 1045   Dressing Status Clean;Dry;Intact 10/28/24 1045               Dionna Valera RN, BSN, CCRN

## 2024-10-28 NOTE — ASSESSMENT & PLAN NOTE
Goals:  Code Status: DNR/DNI - Level 3  Decisional apparatus:  Patient is not competent on my exam today.  If competence is lost, patient's substitute decision maker is her granddaughter Molly Braden based on the ACP documentation  Advance Directive / Living Will / POLST:  on file    Had a lengthy discussion with the patient's POA, Molly, over the phone. She has been updated on the current plan by the primary and surgical teams. The granddaughter shared that one of her grandmother's wishes in the past was to live until 100, and so she wants to respect that wish as much as possible. She understands realistically that her grandmother's condition may worsen or something acute may change, and at that time, she is willing to have a conversation about hospice/comfort care; however, until then, she still accepts interventions such as IV antibiotics, fluid hydration, labs, imaging, and BiPAP.  They do not want any surgeries that include intubation or general anesthesia. Patient is to remain DNR/DNI. Her main goal is to bring her grandma home.   The granddaughter also expressed that she wanted to be updated prior to any future interventions such as additional debridements, etc.   Palliative will follow for ongoing goals of care discussions as situation evolves.

## 2024-10-28 NOTE — CONSULTS
Consultation - Palliative Care   Name: Grace Clayton 89 y.o. female I MRN: [id number]  Unit/Bed#: S -01 I Date of Admission: 10/26/2024   Date of Service: 10/28/2024 I Hospital Day: 2   Inpatient consult to Palliative Care  Consult performed by: Toñito Chang MD  Consult ordered by: Zaria Andrews MD        Physician Requesting Evaluation: Zaria Andrews MD   Reason for Evaluation / Principal Problem: Progressive dementia and no longer taking PO medications. [facility]    Assessment & Plan  Goals of care, counseling/discussion  Goals:  Code Status: DNR/DNI - Level 3  Decisional apparatus:  Patient is not competent on my exam today.  If competence is lost, patient's substitute decision maker is her granddaughter Molly Braden based on the ACP documentation  Advance Directive / Living Will / POLST:  on file    Had a lengthy discussion with the patient's POA, Molly, over the phone. She has been updated on the current plan by the primary and surgical teams. The granddaughter shared that one of her grandmother's wishes in the past was to live until 100, and so she wants to respect that wish as much as possible. She understands realistically that her grandmother's condition may worsen or something acute may change, and at that time, she is willing to have a conversation about hospice/comfort care; however, until then, she still accepts interventions such as IV antibiotics, fluid hydration, labs, imaging, and BiPAP.  They do not want any surgeries that include intubation or general anesthesia. Patient is to remain DNR/DNI. Her main goal is to bring her grandma home.   The granddaughter also expressed that she wanted to be updated prior to any future interventions such as additional debridements, etc.   Palliative will follow for ongoing goals of care discussions as situation evolves.    Palliative care by specialist  Palliative Diagnosis: Dementia, failure to thrive    Social Support:  Supportive listening provided  Normalized  experience of patient and family  Provided anxiety containment  Advocated for patient/family with interdisciplinary team  Mediated conflict    Care Coordination  Case discussed with primary team    Follow-up  We appreciate the opportunity to participate in this patient's care.   We will continue to follow while admitted.    Please do not hesitate to contact our on-call provider through EPIC Secure Chat or contact [phone] should there be an acute change or other symptom control concerns.    Sepsis (HCC)  Admitted for worsening AMS, decreased PO intake and sepsis 2/2 UTI  Received fluids in the ED, currently on IV antibiotics  Management per primary  UTI (urinary tract infection)  Management per primary  Pressure ulcers of skin of multiple topographic sites  General surgery consulted, completed bedside debridement on 10/27  Surgery will continue to follow  Acute encephalopathy  Multifactorial, could be related to dehydration, infection, progressive dementia   Dysphagia  Cleared by speech therapy for puree diet with thick liquids   Failure to thrive in adult  Decreased PO intake prior to admission  Low protein and albumin levels  Cleared by speech therapy for diet, requires max assist for feeds  Nutrition consult placed  Moderate protein-calorie malnutrition (HCC)  Malnutrition Findings:   Adult Malnutrition type: Acute illness  Adult Degree of Malnutrition: Malnutrition of moderate degree  Malnutrition Characteristics: Fat loss, Muscle loss                  360 Statement: Moderate malnutrition related to inadequate oral intake as evidenced by subcutaneous fat and muscle loss extremities, temples, orbitals; < 50 -75% estimated nutritional needs. Currently treated with oral supplementation.    BMI Findings:           Body mass index is 24.72 kg/m².     I have discussed the above management plan in detail with the primary service.       PDMP Review: I have reviewed the patient's controlled substance dispensing  history in the Prescription Drug Monitoring Program in compliance with the [facility] regulations before prescribing any controlled substances.    History of Present Illness   HPI: Grace Clayton is a 89 y.o. year old female who presents with sepsis 2/2 UTI, worsening AMS, decreased PO intake, and FTT. Other PMH is significant for dementia with behavioral disturbances, sacral pressure ulcers, AF, HTN, and HLD. The patient arrived via EMS after her granddaughter noticed decreased PO intake over 2 weeks and increased confusion the day of admission. In the ED, the patient was in A-fib with RVR and was given IV metoprolol. Workup was also significant for leukocytosis, lactic acidosis, elevated troponins, elevated BNP, and low protein/albumin. UA was positive for leukocytes, nitrites, ketones with her urine culture later returning positive for 80-90k colonies GNRs. Other sepsis workup was completed and the patient received IV fluids and antibiotics. CXR showed pleural effusions while CT showed osteomyelitis of the lower sacrum/upper coccyx, constipation. General surgery was consulted to evaluate the sacral wound and they completed a bedside debridement. Palliative care was consulted for GOC.     Per chart review, the patient has had multiple admissions this past year with the most recent admission back in September 2024. During this admission, the patient also presented with AMS for which she was treated with fluids for her JUSTINA and antibiotics for a UTI. Palliative care was consulted for GOC and the family/granddaughter had decided DNR/DNI, as they were not ready for comfort measures. Surgery was consulted for wound debridement of the patient's stage IV sacral pressure ulcer, but the surgery was deemed high-risk as the patient would need intubation and general anesthesia. Pt had waxing and waning mental status so they attempted a trial of melatonin and seroquel; however, this was discontinued due to oversedation. Area of aging  came to evaluate the patient and she was cleared to return home.     I evaluated the patient at bedside and she was with one of the staff members eating lunch.  She was alert but oriented x 0.  She appeared to be tolerating her diet well and was cooperative.  I later called the granddaughter and POA, Molly Braden, and we had a long conversation about goals of care.  Molly shared that they are not ready for hospice and one of the main reasons for this is, she is trying to respect one of her grandmother's past wishes to live until 100.  Back when her grandma celebrated her 90th birthday, she shared with Molly that she wanted to live until 100 so that she could throw a huge party and celebrate with all her family.  Although Grace's mentation has changed, Molly still wants to respect that.  Realistically, she understands that her grandma may not make it to the next birthday or that something could change.  If her grandmother acutely worsens, she states she is fine having the conversation about transition to hospice.  Until then, she wants to continue medical interventions including IV antibiotics, fluids, labs, imaging, and BiPAP.  She does not want any surgical procedures that will include intubation and general anesthesia.      Social: Grace had 4 children, 3 daughters and 1 son.  Molly is taking care of her uncle who has Alzheimer's dementia.  She also shared that her mother recently passed last year in October to cancer.  Her mother was in the ICU and had been extubated, but developed respiratory failure again.  One of her aunts is estranged and they have not had contact in years.  The final aunt is no longer allowed to be near Grace after she was charged for fracturing Grace's hand/wrist.             Review of Systems   Unable to perform ROS: Dementia     I have reviewed the patient's PMH, PSH, Social History, Family History, Meds, and Allergies    Objective :  Temp:  [97.1 °F (36.2 °C)-99.3 °F (37.4  °C)] 99.3 °F (37.4 °C)  HR:  [] 74  BP: (121-150)/() 123/73  Resp:  [16-18] 18  SpO2:  [93 %-96 %] 94 %  O2 Device: None (Room air)    Physical Exam  Vitals reviewed.   Constitutional:       Appearance: She is ill-appearing.      Comments: Patient was eating lunch with assistance from one of the aides. She followed instructions well and appeared to tolerate the meal. When the aide stepped out, she did call out for help but calmed down once the aide returned. Communicated with one-two word sentences.   Alert but disoriented.    HENT:      Head: Normocephalic and atraumatic.   Eyes:      Extraocular Movements: Extraocular movements intact.      Conjunctiva/sclera: Conjunctivae normal.   Pulmonary:      Effort: Pulmonary effort is normal. No respiratory distress.   Skin:     General: Skin is warm and dry.   Neurological:      Mental Status: Mental status is at baseline. She is disoriented.            Lab Results: I have reviewed the following results:  Lab Results   Component Value Date/Time    SODIUM 145 10/28/2024 06:10 AM    SODIUM 137 10/08/2024 04:18 AM    K 2.9 (L) 10/28/2024 06:10 AM    K 4.4 10/08/2024 04:18 AM    BUN 12 10/28/2024 06:10 AM    BUN 14 10/08/2024 04:18 AM    CREATININE 0.52 (L) 10/28/2024 06:10 AM    CREATININE 0.73 10/08/2024 04:18 AM    GLUC 96 10/28/2024 06:10 AM    GLUC 91 10/08/2024 04:18 AM    CALCIUM 8.2 (L) 10/28/2024 06:10 AM    CALCIUM 8.0 (L) 10/08/2024 04:18 AM    AST 14 10/27/2024 04:41 AM    AST 31 10/07/2024 04:36 AM    ALT 8 10/27/2024 04:41 AM    ALT 18 10/07/2024 04:36 AM    ALB 2.5 (L) 10/27/2024 04:41 AM    ALB 1.8 (L) 10/07/2024 04:36 AM    TP 5.7 (L) 10/27/2024 04:41 AM    TP 4.2 (L) 10/07/2024 04:36 AM    EGFR 79 10/28/2024 06:10 AM    EGFR  10/08/2024 04:18 AM     Not performed on patients less than 18 years of age or greater than 97 years of age    EGFR 44 (L) 10/31/2020 12:16 PM     Lab Results   Component Value Date/Time    HGB 9.2 (L) 10/28/2024 06:10 AM     HGB 7.2 (L) 10/05/2024 06:59 AM    WBC 13.57 (H) 10/28/2024 06:10 AM     10/28/2024 06:10 AM    INR 1.6 03/21/2024 03:47 PM    PTT 35 01/12/2024 11:57 PM     Lab Results   Component Value Date/Time    PZW6CMHWQXKY 3.141 10/26/2024 07:07 PM       Imaging Results Review: I reviewed radiology reports from this admission including: CT abdomen/pelvis, CT head, and xray(s).  Other Study Results Review: EKG was reviewed.     Code Status: Level 3 - DNAR and DNI  Advance Directive and Living Will:      Power of : Yes  POLST:      Administrative Statements   I have spent a total time of 60 minutes in caring for this patient on the day of the visit/encounter including Diagnostic results, Prognosis, Patient and family education, Risk factor reductions, Counseling / Coordination of care, Documenting in the medical record, Reviewing / ordering tests, medicine, procedures  , Obtaining or reviewing history  , and Communicating with other healthcare professionals . Topics discussed with the patient / family include medication review, psychosocial support, advanced directives, goals of care, supportive listening, and anticipatory guidance.

## 2024-10-29 LAB
ANION GAP SERPL CALCULATED.3IONS-SCNC: 5 MMOL/L (ref 4–13)
BACTERIA UR CULT: NORMAL
BASOPHILS # BLD AUTO: 0.05 THOUSANDS/ΜL (ref 0–0.1)
BASOPHILS NFR BLD AUTO: 1 % (ref 0–1)
BUN SERPL-MCNC: 13 MG/DL (ref 5–25)
CALCIUM SERPL-MCNC: 7.6 MG/DL (ref 8.4–10.2)
CHLORIDE SERPL-SCNC: 107 MMOL/L (ref 96–108)
CO2 SERPL-SCNC: 28 MMOL/L (ref 21–32)
CREAT SERPL-MCNC: 0.51 MG/DL (ref 0.6–1.3)
EOSINOPHIL # BLD AUTO: 0.29 THOUSAND/ΜL (ref 0–0.61)
EOSINOPHIL NFR BLD AUTO: 4 % (ref 0–6)
ERYTHROCYTE [DISTWIDTH] IN BLOOD BY AUTOMATED COUNT: 18.6 % (ref 11.6–15.1)
GFR SERPL CREATININE-BSD FRML MDRD: 79 ML/MIN/1.73SQ M
GLUCOSE SERPL-MCNC: 165 MG/DL (ref 65–140)
HCT VFR BLD AUTO: 28.2 % (ref 34.8–46.1)
HGB BLD-MCNC: 8.3 G/DL (ref 11.5–15.4)
IMM GRANULOCYTES # BLD AUTO: 0.05 THOUSAND/UL (ref 0–0.2)
IMM GRANULOCYTES NFR BLD AUTO: 1 % (ref 0–2)
LYMPHOCYTES # BLD AUTO: 1.46 THOUSANDS/ΜL (ref 0.6–4.47)
LYMPHOCYTES NFR BLD AUTO: 18 % (ref 14–44)
MCH RBC QN AUTO: 29.6 PG (ref 26.8–34.3)
MCHC RBC AUTO-ENTMCNC: 29.4 G/DL (ref 31.4–37.4)
MCV RBC AUTO: 101 FL (ref 82–98)
MONOCYTES # BLD AUTO: 0.57 THOUSAND/ΜL (ref 0.17–1.22)
MONOCYTES NFR BLD AUTO: 7 % (ref 4–12)
MRSA NOSE QL CULT: NORMAL
NEUTROPHILS # BLD AUTO: 5.88 THOUSANDS/ΜL (ref 1.85–7.62)
NEUTS SEG NFR BLD AUTO: 69 % (ref 43–75)
NRBC BLD AUTO-RTO: 0 /100 WBCS
PLATELET # BLD AUTO: 335 THOUSANDS/UL (ref 149–390)
PMV BLD AUTO: 10 FL (ref 8.9–12.7)
POTASSIUM SERPL-SCNC: 3.8 MMOL/L (ref 3.5–5.3)
POTASSIUM SERPL-SCNC: 5.5 MMOL/L (ref 3.5–5.3)
RBC # BLD AUTO: 2.8 MILLION/UL (ref 3.81–5.12)
SODIUM SERPL-SCNC: 140 MMOL/L (ref 135–147)
WBC # BLD AUTO: 8.3 THOUSAND/UL (ref 4.31–10.16)

## 2024-10-29 PROCEDURE — 99232 SBSQ HOSP IP/OBS MODERATE 35: CPT | Performed by: INTERNAL MEDICINE

## 2024-10-29 PROCEDURE — 92526 ORAL FUNCTION THERAPY: CPT

## 2024-10-29 PROCEDURE — 80048 BASIC METABOLIC PNL TOTAL CA: CPT

## 2024-10-29 PROCEDURE — 85025 COMPLETE CBC W/AUTO DIFF WBC: CPT

## 2024-10-29 PROCEDURE — 84132 ASSAY OF SERUM POTASSIUM: CPT

## 2024-10-29 PROCEDURE — 99233 SBSQ HOSP IP/OBS HIGH 50: CPT | Performed by: STUDENT IN AN ORGANIZED HEALTH CARE EDUCATION/TRAINING PROGRAM

## 2024-10-29 RX ADMIN — CEFEPIME 1000 MG: 1 INJECTION, POWDER, FOR SOLUTION INTRAMUSCULAR; INTRAVENOUS at 04:11

## 2024-10-29 RX ADMIN — METOROPROLOL TARTRATE 5 MG: 5 INJECTION, SOLUTION INTRAVENOUS at 00:54

## 2024-10-29 RX ADMIN — POTASSIUM CHLORIDE, DEXTROSE MONOHYDRATE AND SODIUM CHLORIDE 75 ML/HR: 300; 5; 900 INJECTION, SOLUTION INTRAVENOUS at 04:11

## 2024-10-29 RX ADMIN — ENOXAPARIN SODIUM 60 MG: 60 INJECTION SUBCUTANEOUS at 09:30

## 2024-10-29 RX ADMIN — ENOXAPARIN SODIUM 60 MG: 60 INJECTION SUBCUTANEOUS at 21:23

## 2024-10-29 RX ADMIN — PANTOPRAZOLE SODIUM 40 MG: 40 TABLET, DELAYED RELEASE ORAL at 09:30

## 2024-10-29 RX ADMIN — CEFEPIME 1000 MG: 1 INJECTION, POWDER, FOR SOLUTION INTRAMUSCULAR; INTRAVENOUS at 15:40

## 2024-10-29 RX ADMIN — SENNOSIDES AND DOCUSATE SODIUM 1 TABLET: 8.6; 5 TABLET ORAL at 21:23

## 2024-10-29 RX ADMIN — COLLAGENASE SANTYL 1 APPLICATION: 250 OINTMENT TOPICAL at 16:44

## 2024-10-29 RX ADMIN — DICLOFENAC SODIUM TOPICAL GEL, 1% 2 G: 10 GEL TOPICAL at 21:29

## 2024-10-29 RX ADMIN — CYANOCOBALAMIN TAB 500 MCG 1000 MCG: 500 TAB at 09:30

## 2024-10-29 RX ADMIN — METOROPROLOL TARTRATE 5 MG: 5 INJECTION, SOLUTION INTRAVENOUS at 14:14

## 2024-10-29 NOTE — DISCHARGE INSTR - AVS FIRST PAGE
Dear Grace Clayton,     It was our pleasure to care for you here at [facility].  It is our hope that we were always able to exceed the expected standards for your care during your stay.  You were hospitalized due to Urinary tract infection and sacral wound osteomyelitis.  You were cared for on the 4th floor by Maxine Cerna MD under the service of Oral Johnson MD with the [facility] who covers for your primary care physician (PCP), No primary care provider on file., while you were hospitalized.  If you have any questions or concerns related to this hospitalization, you may contact us at .  For follow up as well as any medication refills, we recommend that you follow up with your primary care physician.  A registered nurse will reach out to you by phone within a few days after your discharge to answer any additional questions that you may have after going home.  However, at this time we provide for you here, the most important instructions / recommendations at discharge:     Notable Medication Adjustments -   Your Metoprolol dose has been increased. Please start taking 25 mg (1 tablet) by mouth every 12 hrs ( twice a day).   Collagenase (Santyl) ointment to be applied to wound bed over white/yellow tissue daily.  No other medication adjustments have been made  Testing Required after Discharge -   None  ** Please contact your PCP to request testing orders for any of the testing recommended here **  Important follow up information -   Please make an appointment to follow up with your primary care provider within 1 week of discharge   Other Instructions -   For your sacral wound: Sacral wound: Apply thick layer of santyl to wound bed over white/yellow tissue, lightly moisten kerlex gauze with saline or sterile water, wring out excess water, pack into wound and cover with foam boarded Allevyn pad. Change daily or as needed for soilage. Please let  the visiting nurse follow these instructions.   Other instructions have been stated below, please let the visiting nurse follow these instructions for proper wound care.   Speech therapist recommendations: Pureed foods with nectar thick liquids. Maintain aspiration precautions, upright ~90 degree positioning while eating or drinking and at least 30 minutes after meals, small bites, slow rate of intake, and alternate liquids/solids.   Advised to do urine voiding trial with home health in a week to help discontinue the urinary catheter.  We hope you feel better soon. We thank you for choosing us to be part of your health care.   Please review this entire after visit summary as additional general instructions including medication list, appointments, activity, diet, any pertinent wound care, and other additional recommendations from your care team that may be provided for you.      Sincerely,     Maxine Cerna MD

## 2024-10-29 NOTE — ASSESSMENT & PLAN NOTE
Malnutrition Findings:   Adult Malnutrition type: Acute illness  Adult Degree of Malnutrition: Malnutrition of moderate degree  Malnutrition Characteristics: Fat loss, Muscle loss                  360 Statement: Moderate malnutrition related to inadequate oral intake as evidenced by subcutaneous fat and muscle loss extremities, temples, orbitals; < 50 -75% estimated nutritional needs. Currently treated with oral supplementation.    BMI Findings:           Body mass index is 24.6 kg/m².

## 2024-10-29 NOTE — SPEECH THERAPY NOTE
"Speech Language/Pathology    Speech/Language Pathology Progress Note    Patient Name: Grace Clayton  Today's Date: 10/29/2024     Problem List  Principal Problem:    Sepsis (HCC)  Active Problems:    Hyperlipidemia    Ambulatory dysfunction    Paroxysmal atrial fibrillation (HCC)    Essential hypertension    Elevated troponin    UTI (urinary tract infection)    Pressure ulcers of skin of multiple topographic sites    Acute encephalopathy    Dysphagia    Failure to thrive in adult    Palliative care by specialist    Goals of care, counseling/discussion    Moderate protein-calorie malnutrition (HCC)       Past Medical History  Past Medical History:   Diagnosis Date    Atrial fibrillation (HCC)     Dementia (HCC)     Hypertension         Past Surgical History  History reviewed. No pertinent surgical history.      Updated History:  Last seen by SLP 10/27 recommending puree textures and nectar thick liquids, meds crushed with puree.     Per Palliative Care MD Charlene 10/28 \"She understands realistically that her grandmother's condition may worsen or something acute may change, and at that time, she is willing to have a conversation about hospice/comfort care; however, until then, she still accepts interventions such as IV antibiotics, fluid hydration, labs, imaging, and BiPAP. They do not want any surgeries that include intubation or general anesthesia. Patient is to remain DNR/DNI. Her main goal is to bring her grandma home.\"    Updated Imaging:  No new imaging.     Subjective:  Pt asleep upon arrival, aroused via verbal and tactile stimuli. HOB raised, breakfast tray present of puree textures and nectar thick liquids. Per previous VFSS, recommended diet thin liquids. Pt noted to yell throughout re-evaluation w/ guarded bolus acceptance.     Objective:  Materials administered: puree textures, thin liquids x1, nectar thick liquids -limited PO trials this date 2/2 pt w/ orally defensive behaviors     Adequate labial seal for " retrieval and containment of all materials, no anterior bolus loss present. Of note, pt w/ orally defensive behaviors, oral cavity clamped shut at times, turning head away from clinician, lingual thrust noted during presentation of materials via tsp. Min prolonged oral manipulation of puree textures w/ functional bolus formation and adequate bolus transfer. No oral residue noted. Suspected delayed swallow initiation and reduced hyolaryngeal elevation to palpation. Pt w/ immediate cough response x1 w/ initial cup sip thin liquids, however pt w/ orally defensive behaviors upon further attempt for thin liquid trials. No overt s/s of aspiration w/ other materials administered.     Assessment:  Pt presents w/ s/s suggestive of mod oral dysphagia, suspected mild pharyngeal dysphagia characterized by descriptions above.    Plan/Recommendations:  Puree textures and nectar thick liquids   Meds crushed in puree   Swallow strategies: upright posture, small bites/sips, slow rate   Frequent/thorough oral care   Provide feeding assistance   ST to f/u for diet tolerance and potential diet upgrade as able, appropriate, and pt willing.

## 2024-10-29 NOTE — ASSESSMENT & PLAN NOTE
Goals:  Code Status: DNR/DNI - Level 3  Decisional apparatus:  Patient is not competent on my exam today.  If competence is lost, patient's substitute decision maker is her granddaughter Molly Braden based on the ACP documentation  Advance Directive / Living Will / POLST:  on file    10/28/24 - Had a lengthy discussion with the patient's POA, Molly, over the phone. She has been updated on the current plan by the primary and surgical teams. The granddaughter shared that one of her grandmother's wishes in the past was to live until 100, and so she wants to respect that wish as much as possible. She understands realistically that her grandmother's condition may worsen or something acute may change, and at that time, she is willing to have a conversation about hospice/comfort care; however, until then, she still accepts interventions such as IV antibiotics, fluid hydration, labs, imaging, and BiPAP.  They do not want any surgeries that include intubation or general anesthesia. Patient is to remain DNR/DNI. Her main goal is to bring her grandma home.   The granddaughter also expressed that she wanted to be updated prior to any future interventions such as additional debridements, etc.

## 2024-10-29 NOTE — PHYSICAL THERAPY NOTE
PHYSICAL THERAPY NOTE    Patient Name: Grace Clayton  Today's Date: 10/29/2024     10/29/24 0830   PT Last Visit   PT Visit Date 10/29/24   Note Type   Note type Screen   Additional Comments Chart reviewed including recent admission PT consult while patient [facility], as well as case management note.  Per EMR, patient has 24-hour care at home and is totally dependent with ADLs and mobility.  Patient is bedbound.  Poke to nursing who reports patient does not provide any physical assist with her mobility here, and is unable to follow commands per nursing.  Will screen patient from IP PT caseload, with reports of patient's discharge disposition not changing from admission.      Judi Michelle, PT

## 2024-10-29 NOTE — ASSESSMENT & PLAN NOTE
Palliative Diagnosis: Dementia, failure to thrive    Social Support:  Supportive listening provided  Normalized experience of patient and family  Provided anxiety containment  Advocated for patient/family with interdisciplinary team  Mediated conflict    Care Coordination  Case discussed with primary team    Follow-up  We appreciate the opportunity to participate in this patient's care. We will sign out at this time. Please do not hesitate to contact our on-call provider through EPIC Secure Chat or contact [phone] should there be an acute change or other symptom control concerns.

## 2024-10-29 NOTE — ASSESSMENT & PLAN NOTE
Met sepsis criteria with tachycardia, leukocytosis of 21K, lactic acidosis and suspected source of infection being urinary tract infection  Status post 1.5 L bolus of Isolyte in the ED  Received 1 dose of IV cefepime in the ED  She has multiple pressure wounds including a sacral ulcers-does not look infected at this time  Sacral decubitus ulcer. Cortical bony erosion consistent with osteomyelitis of the lower sacrum and upper coccyx.  Moderate bilateral pleural effusions with overlying compressive atelectasis.  Constipation with very large bolus of stool in the rectum and mild stercoral proctitis.  Blood cultures no growth at 24 hours.  CBC improved from 13 to 8       Plan  Gentle IV hydration  Switch from IV ceftriaxone to IV cefepime 10/28.  Follow-up on wound cultures  Follow-up on blood cultures

## 2024-10-29 NOTE — PROCEDURES
"Venous Access Line Insertion    Date/Time: 10/29/2024 10:41 AM    Performed by: Eleanor Lucio RN  Authorized by: Oral Johnson MD    Patient location:  Bedside  Pre-procedure details:     Hand hygiene: Hand hygiene performed prior to insertion      Skin preparation:  ChloraPrep    Skin preparation agent: Skin preparation agent completely dried prior to procedure    Procedure details:     Complex Venous Access Line Type: US Guided Peripheral IV      Orientation:  Right and lower    Location:  Arm    Catheter size:  20 gauge (1.88\")    Patient evaluated for contraindications to access (i.e. fistula, thrombosis, etc): Yes      Approach: percutaneous technique used      Sterile ultrasound techniques: Sterile gel and sterile probe covers were used      Number of attempts:  1    Successful placement: yes      Landmarks identified: yes    Anesthesia (see MAR for exact dosages):     Anesthesia method:  None  Post-procedure details:     Post-procedure:  Dressing applied    Assessment:  Blood return through all ports    Post-procedure complications: none      Patient tolerance of procedure:  Tolerated well, no immediate complications          "

## 2024-10-29 NOTE — WOUND OSTOMY CARE
Progress Note - Wound   Grace Clayton 89 y.o. female MRN: [id number]  Unit/Bed#: S -01 Encounter: [id number]        Assessment:   Wound care re-consulted due to new skin tear on left forearm. Wound care following right arm skin tear and orders placed yesterday. Focused assessment to left arm and orders adjusted.     Findings:    Left lower arm, skin tear - irregular shaped area of partial thickness skin loss. Wound bed is fragile and pink with small amount of bloody drainage. Leonor wound skin is fragile, pink and purple. No flap present. Etiology unclear. Recommend dermagran with ABD and Atilio.       No induration, fluctuance, odor, warmth/temperature differences, redness, or purulence noted to the above noted wounds and skin areas assessed. New dressings applied per orders listed below. Patient tolerated well- no s/s of non-verbal pain or discomfort observed during the encounter. Bedside nurse aware of plan of care. See flow sheets for more detailed assessment findings.      Orders listed below and wound care will continue to follow, call or Secure Chat with questions.     Skin care plans:   1-Hydraguard to bilateral heels BID and PRN   2-Elevate heels to offload pressure.   3-Ehob cushion in chair when out of bed.   4-Moisturize skin daily with skin nourishing cream.   5-Turn/reposition q2h for pressure re-distribution on skin.   6- B/L arms: Irrigate with NSS. Pat dry. Apply Dermagran directly to wound bed. Cover with ABD. Wrap with Atilio. Secure with tape; do no apply tape directly to skin. Change daily or PRN for soilage/dislodgement.   7-R lower leg: irrigate with NSS. Pat dry. Cover with silicone foam dressing. Change every other day   8- Low air loss mattres         Wound 10/27/24 Abrasion(s) Pretibial Distal;Right (Active)   Wound Image   10/28/24 1049   Wound Description Pink;Fragile 10/28/24 1049   Leonor-wound Assessment Hyperpigmented;Fragile 10/28/24 1049   Wound Length (cm) 0.5 cm 10/28/24 1049   Wound  Width (cm) 0.5 cm 10/28/24 1049   Wound Depth (cm) 0.1 cm 10/28/24 1049   Wound Surface Area (cm^2) 0.25 cm^2 10/28/24 1049   Wound Volume (cm^3) 0.025 cm^3 10/28/24 1049   Calculated Wound Volume (cm^3) 0.03 cm^3 10/28/24 1049   Drainage Amount Scant 10/28/24 1049   Drainage Description Serous 10/28/24 1049   Non-staged Wound Description Partial thickness 10/28/24 1049   Treatments Cleansed;Site care 10/28/24 1049   Dressing Foam, Silicon (eg. Allevyn, etc) 10/28/24 1049   Wound packed? No 10/28/24 1049   Packing- # removed 0 10/28/24 1049   Packing- # inserted 0 10/28/24 1049   Dressing Changed New 10/28/24 1049   Patient Tolerance Tolerated well 10/28/24 1049   Dressing Status Clean;Dry;Intact 10/28/24 1049       Wound 10/27/24 Skin tear Arm Anterior;Right;Lower (Active)   Wound Image   10/28/24 1045   Wound Description Drainage;Fragile 10/28/24 1045   Leonor-wound Assessment Hyperpigmented;Pink 10/28/24 1045   Wound Length (cm) 4 cm 10/28/24 1045   Wound Width (cm) 2 cm 10/28/24 1045   Wound Depth (cm) 0.2 cm 10/28/24 1045   Wound Surface Area (cm^2) 8 cm^2 10/28/24 1045   Wound Volume (cm^3) 1.6 cm^3 10/28/24 1045   Calculated Wound Volume (cm^3) 1.6 cm^3 10/28/24 1045   Wound Site Closure Approximated 10/28/24 1045   Drainage Amount Small 10/28/24 1045   Drainage Description Serous 10/28/24 1045   Non-staged Wound Description Partial thickness 10/28/24 1045   Treatments Cleansed;Site care 10/28/24 1045   Dressing Dermagran gauze;Foam, Silicon (eg. Allevyn, etc) 10/28/24 1045   Wound packed? No 10/28/24 1045   Packing- # removed 0 10/28/24 1045   Packing- # inserted 0 10/28/24 1045   Dressing Changed New 10/28/24 1045   Patient Tolerance Tolerated well 10/28/24 1045   Dressing Status Clean;Dry;Intact 10/28/24 1045       Wound 10/29/24 Arm Left;Lower (Active)   Wound Image   10/29/24 0951   Wound Length (cm) 5 cm 10/29/24 0951   Wound Width (cm) 4 cm 10/29/24 0951   Wound Depth (cm) 0.2 cm 10/29/24 0951   Wound  Surface Area (cm^2) 20 cm^2 10/29/24 0951   Wound Volume (cm^3) 4 cm^3 10/29/24 0951   Calculated Wound Volume (cm^3) 4 cm^3 10/29/24 0951                Dionna Valera RN, BSN, CCRN

## 2024-10-29 NOTE — QUICK NOTE
WOUND CHECK    Sacral wound was evaluated. Minimal soft slough present in wound bed. Palpable bone. Rest of wound bed beefy red. Santyl applied to wound bed and packed with moist keflex and covered with foam boarded pad.         Continue daily wound care per orders  Frequent turnings and off-loading pressure  Protein supplements    Daphne Clarke  10/29/24 4:19 PM

## 2024-10-29 NOTE — PROGRESS NOTES
Progress Note - Hospitalist   Name: Grace Clayton 89 y.o. female I MRN: [id number]  Unit/Bed#: S -01 I Date of Admission: 10/26/2024   Date of Service: 10/29/2024 I Hospital Day: 3    Assessment & Plan  Sepsis (HCC)  Met sepsis criteria with tachycardia, leukocytosis of 21K, lactic acidosis and suspected source of infection being urinary tract infection  Status post 1.5 L bolus of Isolyte in the ED  Received 1 dose of IV cefepime in the ED  She has multiple pressure wounds including a sacral ulcers-does not look infected at this time  Sacral decubitus ulcer. Cortical bony erosion consistent with osteomyelitis of the lower sacrum and upper coccyx.  Moderate bilateral pleural effusions with overlying compressive atelectasis.  Constipation with very large bolus of stool in the rectum and mild stercoral proctitis.  Blood cultures no growth at 24 hours.  CBC improved from 13 to 8       Plan  Gentle IV hydration  Switch from IV ceftriaxone to IV cefepime 10/28.  Follow-up on wound cultures  Follow-up on blood cultures    UTI (urinary tract infection)  Urinalysis positive for leukocytes, bacteria, blood, positive nitrites  Presented with altered mental status  No suprapubic tenderness  Met sepsis criteria  Urine culture grows 80,000-89,000 CFU per mL Citrobacter freundii and 40,000-49,000 CFU per mL Pseudomonas aeruginosa  Oliguria urine output of 300 mL in the past 24 hours(expected urine output 720 mL/day)    Plan  IV cefepime 1 g daily  On the plan under sepsis  Acute encephalopathy  Patient has history of dementia with behavioral disturbance  Per granddaughter patient was at baseline mental status until today morning.  Morning patient's mental status patient was not, started repeating words, worsening swallow difficulty  She has dysphagia and baseline oral intake has been very poor for the past 2 weeks, was just taking Ensure  In the ED, met sepsis criteria, with urinalysis suggestive of UTI, status post IV fluid  resuscitation and IV cefepime  Acute encephalopathy possibly secondary to UTI or other infection versus worsening dementia versus stroke versus delirium  Plan  CT head to rule out stroke or other intracranial abnormality due to acute onset of dysphagia and repeat words  Treatment for UTI  Ammonia levels  Monitor electrolytes  Delirium precautions  Fall precautions  Consider geriatrics consult  Hyperlipidemia  History of hyperlipidemia, patient was supposed to be on pravastatin 20 mg daily-has not taken these medications for the past 2 weeks  Essential hypertension  History of hypertension and A-fib, supposed to be on Lopressor 12.5 mg twice daily-has not been taking this medication for the past 2 weeks  We will continue Lopressor 12.5 mg daily  Paroxysmal atrial fibrillation (HCC)  On admission, patient was in A-fib with RVR  Has been off Eliquis and Lopressor for the past 2 weeks   Received 1 dose of IV Lopressor 5 mg in the ED after which the heart rates back to normal limits  Plan  Restart Eliquis 2.5 mg twice daily and Lopressor 12.5 mg twice daily  Continue to monitor heart rates  Elevated troponin  Patient presented with altered mental status and poor oral intake  No chest pain  Troponin levels in the ED elevated at-0 hour at 109, 2 hr at 101 ,4 hr 113  and delta-29  No ischemic changes on the EKG  BNP at 591 but patient looks very dry and no signs of volume overload-last echo in February 2024 showed ejection fraction of 70% days with normal systolic function, mild concentric hypertrophy  Has had a history of non MI troponin elevation in the past  Likely related to A-fib with RVR causing non-MI troponin elevation  Plan  Continue to monitor heart rates and keep the patient on telemetry   monitor for symptoms    Ambulatory dysfunction  Bedbound  PT/OT evaluation  Pressure ulcers of skin of multiple topographic sites  Patient has multiple pressure ulcers including a sacral wound  Does not appear infected  However  presented with sepsis criteria likely secondary to UTI  Plan  On IV ceftriaxone currently for the UTI  Wound cultures  Wound care consult  Follow-up CT abdomen pelvis  Dysphagia  Patient has history on dysphagia, per chart review patient was on puréed diet with thin liquid  Nursing dysphagia screen was done with dysphagia diet-nurse report patient is unable to swallow  Plan  Will keep the patient n.p.o. for now  Follow-up on CT head for any acute intracranial changes  Speech and swallow evaluation  Failure to thrive in adult  Daughter reports that the patient has been having poor oral intake for the past 2 weeks and has been weak and fatigued  She has been just drinking her Ensure but with poor oral intake  History of dysphagia  BMI within normal limits but very dry on physical exam  Plan   nutrition supplements   speech and swallow evaluation  Inpatient consult to nutrition for further recommendations  Palliative care by specialist    Goals of care, counseling/discussion    Moderate protein-calorie malnutrition (HCC)  Malnutrition Findings:   Adult Malnutrition type: Acute illness  Adult Degree of Malnutrition: Malnutrition of moderate degree  Malnutrition Characteristics: Fat loss, Muscle loss                  360 Statement: Moderate malnutrition related to inadequate oral intake as evidenced by subcutaneous fat and muscle loss extremities, temples, orbitals; < 50 -75% estimated nutritional needs. Currently treated with oral supplementation.    BMI Findings:           Body mass index is 24.6 kg/m².       VTE Pharmacologic Prophylaxis: VTE Score: 5 High Risk (Score >/= 5) - Pharmacological DVT Prophylaxis Ordered: enoxaparin (Lovenox). Sequential Compression Devices Ordered.    Mobility:   Basic Mobility Inpatient Raw Score: 6  JH-HLM Goal: 2: Bed activities/Dependent transfer  JH-HLM Achieved: 2: Bed activities/Dependent transfer  JH-HLM Goal achieved. Continue to encourage appropriate mobility.    Patient  Centered Rounds: I performed bedside rounds with nursing staff today.   Discussions with Specialists or Other Care Team Provider: Attending physician    Education and Discussions with Family / Patient: Updated  (emelyn) via phone.    Current Length of Stay: 3 day(s)  Current Patient Status: Inpatient   Certification Statement: The patient will continue to require additional inpatient hospital stay due to unresolved infection  Discharge Plan: Anticipate discharge in 24-48 hrs to rehab facility.    Code Status: Level 3 - DNAR and DNI    Subjective   Patient seen and examined today, stable    Objective :  Temp:  [97.5 °F (36.4 °C)-99.3 °F (37.4 °C)] 97.5 °F (36.4 °C)  HR:  [] 100  BP: (123-147)/(73-92) 126/91  Resp:  [18] 18  SpO2:  [94 %-98 %] 98 %  O2 Device: None (Room air)    Body mass index is 24.6 kg/m².     Input and Output Summary (last 24 hours):     Intake/Output Summary (Last 24 hours) at 10/29/2024 1449  Last data filed at 10/29/2024 0729  Gross per 24 hour   Intake 1466.75 ml   Output 450 ml   Net 1016.75 ml       Physical Exam  Vitals and nursing note reviewed.   Constitutional:       General: She is not in acute distress.     Appearance: She is well-developed.   HENT:      Head: Normocephalic and atraumatic.   Eyes:      Conjunctiva/sclera: Conjunctivae normal.   Cardiovascular:      Rate and Rhythm: Normal rate and regular rhythm.      Heart sounds: No murmur heard.  Pulmonary:      Effort: Pulmonary effort is normal. No respiratory distress.      Breath sounds: Normal breath sounds.   Abdominal:      Palpations: Abdomen is soft.      Tenderness: There is no abdominal tenderness.   Musculoskeletal:         General: No swelling.      Cervical back: Neck supple.   Skin:     General: Skin is warm and dry.      Capillary Refill: Capillary refill takes less than 2 seconds.   Neurological:      Mental Status: She is alert.   Psychiatric:         Mood and Affect: Mood normal.            Lines/Drains:  Lines/Drains/Airways       Active Status       Name Placement date Placement time Site Days    Urethral Catheter Non-latex;Temperature probe 16 Fr. 10/26/24  2104  Non-latex;Temperature probe  2                  Urinary Catheter:  Goal for removal: N/A - Chronic Chung           Telemetry:  Telemetry Orders (From admission, onward)               24 Hour Telemetry Monitoring  Continuous x 24 Hours (Telem)        Question:  Reason for 24 Hour Telemetry  Answer:  Metabolic/electrolyte disturbance with high probability of dysrhythmia. K level <3 or >6 OR KCL infusion >10mEq/hr                                  Lab Results: I have reviewed the following results:   Results from last 7 days   Lab Units 10/29/24  1039   WBC Thousand/uL 8.30   HEMOGLOBIN g/dL 8.3*   HEMATOCRIT % 28.2*   PLATELETS Thousands/uL 335   SEGS PCT % 69   LYMPHO PCT % 18   MONO PCT % 7   EOS PCT % 4     Results from last 7 days   Lab Units 10/29/24  0411 10/28/24  0610 10/27/24  0441   SODIUM mmol/L 140   < > 145   POTASSIUM mmol/L 3.8   < > 3.0*   CHLORIDE mmol/L 107   < > 101   CO2 mmol/L 28   < > 35*   BUN mg/dL 13   < > 17   CREATININE mg/dL 0.51*   < > 0.57*   ANION GAP mmol/L 5   < > 9   CALCIUM mg/dL 7.6*   < > 8.1*   ALBUMIN g/dL  --   --  2.5*   TOTAL BILIRUBIN mg/dL  --   --  0.68   ALK PHOS U/L  --   --  63   ALT U/L  --   --  8   AST U/L  --   --  14   GLUCOSE RANDOM mg/dL 165*   < > 160*    < > = values in this interval not displayed.                 Results from last 7 days   Lab Units 10/27/24  0441 10/27/24  0132 10/26/24  2342 10/26/24  1907   LACTIC ACID mmol/L  --   --  2.0 2.8*   PROCALCITONIN ng/ml 0.22 0.20  --   --        Recent Cultures (last 7 days):   Results from last 7 days   Lab Units 10/26/24  2233 10/26/24  1907   BLOOD CULTURE   --  No Growth at 48 hrs.   URINE CULTURE  >100,000 cfu/ml  --              Last 24 Hours Medication List:     Current Facility-Administered Medications:      acetaminophen (Ofirmev) injection 1,000 mg, Q6H PRN, Last Rate: 1,000 mg (10/28/24 1535)    [Held by provider] apixaban (ELIQUIS) tablet 2.5 mg, BID    cefepime (MAXIPIME) 1,000 mg in dextrose 5 % 50 mL IVPB, Q12H, Last Rate: 1,000 mg (10/29/24 8971)    collagenase (SANTYL) ointment, Daily    cyanocobalamin (VITAMIN B-12) tablet 1,000 mcg, Daily    enoxaparin (LOVENOX) subcutaneous injection 60 mg, Q12H ANY    metoprolol (LOPRESSOR) injection 5 mg, Q12H    [Held by provider] metoprolol tartrate (LOPRESSOR) partial tablet 12.5 mg, Q12H ANY    pantoprazole (PROTONIX) EC tablet 40 mg, QAM    senna-docusate sodium (SENOKOT S) 8.6-50 mg per tablet 1 tablet, HS    Administrative Statements   Today, Patient Was Seen By: Maxine Cerna MD      **Please Note: This note may have been constructed using a voice recognition system.**

## 2024-10-29 NOTE — OCCUPATIONAL THERAPY NOTE
Occupational Therapy Screen    Patient Name: Grace Clayton  Today's Date: 10/29/2024     10/29/24 0957   OT Last Visit   OT Visit Date 10/29/24   Note Type   Note type Screen   Additional Comments OT orders received and chart review performed. Pt admitted w/ sepsis. Per CM note, pt has 24 hr care at home, is total dependent for ADLs and is bedbound. Pt w/ hx of dementia w/ behavioral disturbance and is unable to follow commands per RN. Pt does not demonstrate need for skilled IPOT at this time and will be screened from caseload.     Edwige Kc, OTD, OTR/L  [place] License [id number]  [place] License [id number]

## 2024-10-29 NOTE — PROGRESS NOTES
Progress Note - Palliative Care   Name: Grace Clayton 89 y.o. female I MRN: [id number]  Unit/Bed#: S -01 I Date of Admission: 10/26/2024   Date of Service: 10/29/2024 I Hospital Day: 3    Assessment & Plan  Goals of care, counseling/discussion  Goals:  Code Status: DNR/DNI - Level 3  Decisional apparatus:  Patient is not competent on my exam today.  If competence is lost, patient's substitute decision maker is her granddaughter Molly Braden based on the ACP documentation  Advance Directive / Living Will / POLST:  on file    10/28/24 - Had a lengthy discussion with the patient's POA, Molly, over the phone. She has been updated on the current plan by the primary and surgical teams. The granddaughter shared that one of her grandmother's wishes in the past was to live until 100, and so she wants to respect that wish as much as possible. She understands realistically that her grandmother's condition may worsen or something acute may change, and at that time, she is willing to have a conversation about hospice/comfort care; however, until then, she still accepts interventions such as IV antibiotics, fluid hydration, labs, imaging, and BiPAP.  They do not want any surgeries that include intubation or general anesthesia. Patient is to remain DNR/DNI. Her main goal is to bring her grandma home.   The granddaughter also expressed that she wanted to be updated prior to any future interventions such as additional debridements, etc.     Palliative care by specialist  Palliative Diagnosis: Dementia, failure to thrive    Social Support:  Supportive listening provided  Normalized experience of patient and family  Provided anxiety containment  Advocated for patient/family with interdisciplinary team  Mediated conflict    Care Coordination  Case discussed with primary team    Follow-up  We appreciate the opportunity to participate in this patient's care. We will sign out at this time. Please do not hesitate to contact our  on-call provider through [facility] Secure Chat or contact [phone] should there be an acute change or other symptom control concerns.    Sepsis (HCC)  Admitted for worsening AMS, decreased PO intake and sepsis 2/2 UTI  Received fluids in the ED, currently on IV antibiotics  Management per primary  UTI (urinary tract infection)  Management per primary  Pressure ulcers of skin of multiple topographic sites  General surgery consulted, completed bedside debridement on 10/27  Surgery will continue to follow  Acute encephalopathy  Multifactorial, could be related to dehydration, infection, progressive dementia   Dysphagia  Cleared by speech therapy for puree diet with thick liquids   Failure to thrive in adult  Decreased PO intake prior to admission  Low protein and albumin levels  Cleared by speech therapy for diet, requires max assist for feeds  Nutrition consult placed  Moderate protein-calorie malnutrition (HCC)  Malnutrition Findings:   Adult Malnutrition type: Acute illness  Adult Degree of Malnutrition: Malnutrition of moderate degree  Malnutrition Characteristics: Fat loss, Muscle loss                  360 Statement: Moderate malnutrition related to inadequate oral intake as evidenced by subcutaneous fat and muscle loss extremities, temples, orbitals; < 50 -75% estimated nutritional needs. Currently treated with oral supplementation.    BMI Findings:           Body mass index is 24.6 kg/m².       Decisional apparatus: Patient is not competent on my exam today. If competence is lost, patient's substitute decision maker would be her granddaughter Molly Braden as written in her ACP documentation.   Advance Directive / Living Will / POLST: Power of  forms on file      PDMP Review: I have reviewed the patient's controlled substance dispensing history in the Prescription Drug Monitoring Program in compliance with the [facility] regulations before prescribing any controlled substances.    Subjective   Patient was  sleeping comfortable in bed this morning. No overnight events reported.     Objective :  Temp:  [97.5 °F (36.4 °C)-99.3 °F (37.4 °C)] 97.5 °F (36.4 °C)  HR:  [] 100  BP: (123-147)/(73-92) 126/91  Resp:  [18] 18  SpO2:  [94 %-98 %] 98 %  O2 Device: None (Room air)    Physical Exam  Vitals reviewed.   Constitutional:       Appearance: She is ill-appearing.      Comments: Thin, pale elderly female. Sleeping comfortably in bed   HENT:      Head: Normocephalic and atraumatic.      Nose: Nose normal.   Cardiovascular:      Rate and Rhythm: Normal rate.   Pulmonary:      Effort: Pulmonary effort is normal. No respiratory distress.   Neurological:      Comments: Baseline is alert but oriented x 0            Lab Results: I have reviewed the following results:  Lab Results   Component Value Date/Time    SODIUM 140 10/29/2024 04:11 AM    SODIUM 137 10/08/2024 04:18 AM    K 3.8 10/29/2024 04:11 AM    K 4.4 10/08/2024 04:18 AM    BUN 13 10/29/2024 04:11 AM    BUN 14 10/08/2024 04:18 AM    CREATININE 0.51 (L) 10/29/2024 04:11 AM    CREATININE 0.73 10/08/2024 04:18 AM    GLUC 165 (H) 10/29/2024 04:11 AM    GLUC 91 10/08/2024 04:18 AM    CALCIUM 7.6 (L) 10/29/2024 04:11 AM    CALCIUM 8.0 (L) 10/08/2024 04:18 AM    AST 14 10/27/2024 04:41 AM    AST 31 10/07/2024 04:36 AM    ALT 8 10/27/2024 04:41 AM    ALT 18 10/07/2024 04:36 AM    ALB 2.5 (L) 10/27/2024 04:41 AM    ALB 1.8 (L) 10/07/2024 04:36 AM    TP 5.7 (L) 10/27/2024 04:41 AM    TP 4.2 (L) 10/07/2024 04:36 AM    EGFR 79 10/29/2024 04:11 AM    EGFR  10/08/2024 04:18 AM     Not performed on patients less than 18 years of age or greater than 97 years of age    EGFR 44 (L) 10/31/2020 12:16 PM     Lab Results   Component Value Date/Time    HGB 9.2 (L) 10/28/2024 06:10 AM    HGB 7.2 (L) 10/05/2024 06:59 AM    WBC 13.57 (H) 10/28/2024 06:10 AM     10/28/2024 06:10 AM    INR 1.6 03/21/2024 03:47 PM    PTT 35 01/12/2024 11:57 PM     Lab Results   Component Value Date/Time     YVM1PTPMPOSD 3.141 10/26/2024 07:07 PM       Code Status: Level 3 - DNAR and DNI  Advance Directive and Living Will:      Power of : Yes  POLST:      Administrative Statements   I have spent a total time of 15 minutes in caring for this patient on the day of the visit/encounter including Counseling / Coordination of care, Documenting in the medical record, Reviewing / ordering tests, medicine, procedures  , and Obtaining or reviewing history  .

## 2024-10-30 PROCEDURE — 99232 SBSQ HOSP IP/OBS MODERATE 35: CPT | Performed by: INTERNAL MEDICINE

## 2024-10-30 PROCEDURE — 92526 ORAL FUNCTION THERAPY: CPT

## 2024-10-30 RX ORDER — ACETAMINOPHEN 325 MG/1
650 TABLET ORAL EVERY 6 HOURS PRN
Status: DISCONTINUED | OUTPATIENT
Start: 2024-10-30 | End: 2024-10-31 | Stop reason: HOSPADM

## 2024-10-30 RX ORDER — METOPROLOL TARTRATE 25 MG/1
25 TABLET, FILM COATED ORAL EVERY 12 HOURS SCHEDULED
Status: DISCONTINUED | OUTPATIENT
Start: 2024-10-30 | End: 2024-10-30

## 2024-10-30 RX ORDER — METOPROLOL TARTRATE 25 MG/1
25 TABLET, FILM COATED ORAL EVERY 12 HOURS SCHEDULED
Status: DISCONTINUED | OUTPATIENT
Start: 2024-10-30 | End: 2024-10-31 | Stop reason: HOSPADM

## 2024-10-30 RX ADMIN — APIXABAN 2.5 MG: 2.5 TABLET, FILM COATED ORAL at 11:37

## 2024-10-30 RX ADMIN — DICLOFENAC SODIUM TOPICAL GEL, 1% 2 G: 10 GEL TOPICAL at 10:10

## 2024-10-30 RX ADMIN — CEFEPIME 1000 MG: 1 INJECTION, POWDER, FOR SOLUTION INTRAMUSCULAR; INTRAVENOUS at 02:10

## 2024-10-30 RX ADMIN — METOROPROLOL TARTRATE 5 MG: 5 INJECTION, SOLUTION INTRAVENOUS at 02:11

## 2024-10-30 RX ADMIN — SENNOSIDES AND DOCUSATE SODIUM 1 TABLET: 8.6; 5 TABLET ORAL at 21:15

## 2024-10-30 RX ADMIN — COLLAGENASE SANTYL: 250 OINTMENT TOPICAL at 10:10

## 2024-10-30 RX ADMIN — DICLOFENAC SODIUM TOPICAL GEL, 1% 2 G: 10 GEL TOPICAL at 21:15

## 2024-10-30 RX ADMIN — METOPROLOL TARTRATE 25 MG: 25 TABLET, FILM COATED ORAL at 21:15

## 2024-10-30 RX ADMIN — DICLOFENAC SODIUM TOPICAL GEL, 1% 2 G: 10 GEL TOPICAL at 18:01

## 2024-10-30 RX ADMIN — APIXABAN 2.5 MG: 2.5 TABLET, FILM COATED ORAL at 17:57

## 2024-10-30 RX ADMIN — METOPROLOL TARTRATE 25 MG: 25 TABLET, FILM COATED ORAL at 10:10

## 2024-10-30 RX ADMIN — DICLOFENAC SODIUM TOPICAL GEL, 1% 2 G: 10 GEL TOPICAL at 11:38

## 2024-10-30 NOTE — ASSESSMENT & PLAN NOTE
Malnutrition Findings:   Adult Malnutrition type: Acute illness  Adult Degree of Malnutrition: Malnutrition of moderate degree  Malnutrition Characteristics: Fat loss, Muscle loss                  360 Statement: Moderate malnutrition related to inadequate oral intake as evidenced by subcutaneous fat and muscle loss extremities, temples, orbitals; < 50 -75% estimated nutritional needs. Currently treated with oral supplementation.    BMI Findings:           Body mass index is 26.9 kg/m².

## 2024-10-30 NOTE — ASSESSMENT & PLAN NOTE
Met sepsis criteria with tachycardia, leukocytosis of 21K, lactic acidosis and suspected source of infection being urinary tract infection  Status post 1.5 L bolus of Isolyte in the ED  Received 1 dose of IV cefepime in the ED  She has multiple pressure wounds including a sacral ulcers-does not look infected at this time  Sacral decubitus ulcer. Cortical bony erosion consistent with osteomyelitis of the lower sacrum and upper coccyx.  Moderate bilateral pleural effusions with overlying compressive atelectasis.  Constipation with very large bolus of stool in the rectum and mild stercoral proctitis.  Blood cultures no growth at 24 hours.  CBC improved from 13 to 8     Plan  Gentle IV hydration  Stop IV antibiotics.  Convert metoprolol to oral.  Stop Lovenox therapeutic and resume Eliquis as patient cannot take orals.  Follow-up on wound cultures  Follow-up on blood cultures

## 2024-10-30 NOTE — SPEECH THERAPY NOTE
Speech Language/Pathology    Speech/Language Pathology Progress Note    Patient Name: Grace Clayton  Today's Date: 10/30/2024       Subjective:  Pt seen for dysphagia tx and was alert and lethargic upon arrival, sitting upright in bed.       Objective:  Pt observed with dysphagia 1 diet with nectar thick liquids. Pt with fair bite strength/utensil stripping, fair bolus manipulation/formation. Mild buccal pocketing observed. Pt with decreased A-P propulsion and swallow trigger on both solids/liquids with Mild residue s/p swallow. No coughing/throat clearing noted on solids/liquids.decreased hyolaryngeal elevation/excursion as noted on palpation with all trialed consistencies. fair suck-swallow technique via straw sips of nectar thick liquids. No coughing/throat clearing noted on nectar thick liquids via straw sip.      Assessment:  Pt demonstrated no clinical overt s/s pen/asp on dysphagia 1 diet with nectar thick liquids.    Plan/Recommendations:  Pt currently tolerating dysphagia 1 diet with nectar thick liquids. Continue current diet consistency. Maintain aspiration precautions, upright ~90 degree positioning during ALL PO intake and at least 30 minutes post PO meals, small bites, slow rate of intake, and alternate liquids/solids. Medications as tolerated. Will continue to follow.       Patsy Silverman MS, CCC-SLP  Speech Pathologist  Available via Epic Chat

## 2024-10-30 NOTE — CASE MANAGEMENT
Case Management Progress Note    Patient name Grace García S /S -01 MRN [id number]  : 1925 Date 10/30/2024       LOS (days): 4  Geometric Mean LOS (GMLOS) (days): 5  Days to GMLOS:1.4        OBJECTIVE:        Current admission status: Inpatient  Preferred [facility]acy:   [id number] [address] [address]  Phone: [phone] Fax: [phone]    Primary Care Provider: No primary care provider on file.    Primary Insurance: MEDICARE  Secondary Insurance: [employer]    PROGRESS NOTE:  CM called and left a message for the Pt's granddaughter Molly [phone] requesting a call back to discuss the Pt's d/c plan and transportation. CM will continue to follow.

## 2024-10-30 NOTE — PLAN OF CARE
Problem: Prexisting or High Potential for Compromised Skin Integrity  Goal: Skin integrity is maintained or improved  Description: INTERVENTIONS:  - Identify patients at risk for skin breakdown  - Assess and monitor skin integrity  - Assess and monitor nutrition and hydration status  - Monitor labs   - Assess for incontinence   - Turn and reposition patient  - Assist with mobility/ambulation  - Relieve pressure over bony prominences  - Avoid friction and shearing  - Provide appropriate hygiene as needed including keeping skin clean and dry  - Evaluate need for skin moisturizer/barrier cream  - Collaborate with interdisciplinary team   - Patient/family teaching  - Consider wound care consult   Outcome: Progressing     Problem: GENITOURINARY - ADULT  Goal: Maintains or returns to baseline urinary function  Description: INTERVENTIONS:  - Assess urinary function  - Encourage oral fluids to ensure adequate hydration if ordered  - Administer IV fluids as ordered to ensure adequate hydration  - Administer ordered medications as needed  - Offer frequent toileting  - Follow urinary retention protocol if ordered  Outcome: Progressing  Goal: Urinary catheter remains patent  Description: INTERVENTIONS:  - Assess patency of urinary catheter  - If patient has a chronic carcamo, consider changing catheter if non-functioning  - Follow guidelines for intermittent irrigation of non-functioning urinary catheter  Outcome: Progressing     Problem: INFECTION - ADULT  Goal: Absence or prevention of progression during hospitalization  Description: INTERVENTIONS:  - Assess and monitor for signs and symptoms of infection  - Monitor lab/diagnostic results  - Monitor all insertion sites, i.e. indwelling lines, tubes, and drains  - Monitor endotracheal if appropriate and nasal secretions for changes in amount and color  - Junction appropriate cooling/warming therapies per order  - Administer medications as ordered  - Instruct and encourage  patient and family to use good hand hygiene technique  - Identify and instruct in appropriate isolation precautions for identified infection/condition  Outcome: Progressing  Goal: Absence of fever/infection during neutropenic period  Description: INTERVENTIONS:  - Monitor WBC    Outcome: Progressing     Problem: Nutrition/Hydration-ADULT  Goal: Nutrient/Hydration intake appropriate for improving, restoring or maintaining nutritional needs  Description: Monitor and assess patient's nutrition/hydration status for malnutrition. Collaborate with interdisciplinary team and initiate plan and interventions as ordered.  Monitor patient'seight and dietary intake as ordered or per policy. Utilize nutrition screening tool and intervene as necessary. Determine patient's food preferences and provide high-protein, high-caloric foods as appropriate.     INTERVENTIONS:  - Monitor oral intake, urinary output, labs, and treatment plans  - Assess nutrition and hydration status and recommend course of action  - Evaluate amount of meals eaten  - Assist patient with eating if necessary   - Allow adequate time for meals  - Recommend/ encourage appropriate diets, oral nutritional supplements, and vitamin/mineral supplements  - Order, calculate, and assess calorie counts as needed  - Recommend, monitor, and adjust tube feedings and TPN/PPN based on assessed needs  - Assess need for intravenous fluids  - Provide specific nutrition/hydration education as appropriate  - Include patient/family/caregiver in decisions related to nutrition  Outcome: Progressing

## 2024-10-30 NOTE — PROGRESS NOTES
Progress Note - Hospitalist   Name: Grace Calyton 89 y.o. female I MRN: [id number]  Unit/Bed#: S -01 I Date of Admission: 10/26/2024   Date of Service: 10/30/2024 I Hospital Day: 4    Assessment & Plan  Sepsis (HCC)  Met sepsis criteria with tachycardia, leukocytosis of 21K, lactic acidosis and suspected source of infection being urinary tract infection  Status post 1.5 L bolus of Isolyte in the ED  Received 1 dose of IV cefepime in the ED  She has multiple pressure wounds including a sacral ulcers-does not look infected at this time  Sacral decubitus ulcer. Cortical bony erosion consistent with osteomyelitis of the lower sacrum and upper coccyx.  Moderate bilateral pleural effusions with overlying compressive atelectasis.  Constipation with very large bolus of stool in the rectum and mild stercoral proctitis.  Blood cultures no growth at 24 hours.  CBC improved from 13 to 8     Plan  Gentle IV hydration  Stop IV antibiotics.  Convert metoprolol to oral.  Stop Lovenox therapeutic and resume Eliquis as patient cannot take orals.  Follow-up on wound cultures  Follow-up on blood cultures    UTI (urinary tract infection)  Urinalysis positive for leukocytes, bacteria, blood, positive nitrites  Presented with altered mental status  No suprapubic tenderness  Met sepsis criteria  Urine culture grows 80,000-89,000 CFU per mL Citrobacter freundii and 40,000-49,000 CFU per mL Pseudomonas aeruginosa  Oliguria urine output of 300 mL in the past 24 hours(expected urine output 720 mL/day)    Plan  IV cefepime 1 g daily  On the plan under sepsis  Acute encephalopathy  Patient has history of dementia with behavioral disturbance  Per granddaughter patient was at baseline mental status until today morning.  Morning patient's mental status patient was not, started repeating words, worsening swallow difficulty  She has dysphagia and baseline oral intake has been very poor for the past 2 weeks, was just taking Ensure  In the ED, met  sepsis criteria, with urinalysis suggestive of UTI, status post IV fluid resuscitation and IV cefepime  Acute encephalopathy possibly secondary to UTI or other infection versus worsening dementia versus stroke versus delirium  Plan  CT head to rule out stroke or other intracranial abnormality due to acute onset of dysphagia and repeat words  Treatment for UTI  Ammonia levels  Monitor electrolytes  Delirium precautions  Fall precautions  Consider geriatrics consult  Hyperlipidemia  History of hyperlipidemia, patient was supposed to be on pravastatin 20 mg daily-has not taken these medications for the past 2 weeks  Essential hypertension  History of hypertension and A-fib, supposed to be on Lopressor 12.5 mg twice daily-has not been taking this medication for the past 2 weeks  We will continue Lopressor 12.5 mg daily  Paroxysmal atrial fibrillation (HCC)  On admission, patient was in A-fib with RVR  Has been off Eliquis and Lopressor for the past 2 weeks   Received 1 dose of IV Lopressor 5 mg in the ED after which the heart rates back to normal limits  Plan  Restart Eliquis 2.5 mg twice daily and Lopressor 12.5 mg twice daily  Continue to monitor heart rates  Elevated troponin  Patient presented with altered mental status and poor oral intake  No chest pain  Troponin levels in the ED elevated at-0 hour at 109, 2 hr at 101 ,4 hr 113  and delta-29  No ischemic changes on the EKG  BNP at 591 but patient looks very dry and no signs of volume overload-last echo in February 2024 showed ejection fraction of 70% days with normal systolic function, mild concentric hypertrophy  Has had a history of non MI troponin elevation in the past  Likely related to A-fib with RVR causing non-MI troponin elevation  Plan  Continue to monitor heart rates and keep the patient on telemetry   monitor for symptoms    Ambulatory dysfunction  Bedbound  PT/OT evaluation  Pressure ulcers of skin of multiple topographic sites  Patient has multiple  pressure ulcers including a sacral wound  Does not appear infected  However presented with sepsis criteria likely secondary to UTI  Plan  On IV ceftriaxone currently for the UTI  Wound cultures  Wound care consult  Follow-up CT abdomen pelvis  Dysphagia  Patient has history on dysphagia, per chart review patient was on puréed diet with thin liquid  Nursing dysphagia screen was done with dysphagia diet-nurse report patient is unable to swallow  Plan  Will keep the patient n.p.o. for now  Follow-up on CT head for any acute intracranial changes  Speech and swallow evaluation  Failure to thrive in adult  Daughter reports that the patient has been having poor oral intake for the past 2 weeks and has been weak and fatigued  She has been just drinking her Ensure but with poor oral intake  History of dysphagia  BMI within normal limits but very dry on physical exam  Plan   nutrition supplements   speech and swallow evaluation  Inpatient consult to nutrition for further recommendations  Palliative care by specialist    Goals of care, counseling/discussion    Moderate protein-calorie malnutrition (HCC)  Malnutrition Findings:   Adult Malnutrition type: Acute illness  Adult Degree of Malnutrition: Malnutrition of moderate degree  Malnutrition Characteristics: Fat loss, Muscle loss                  360 Statement: Moderate malnutrition related to inadequate oral intake as evidenced by subcutaneous fat and muscle loss extremities, temples, orbitals; < 50 -75% estimated nutritional needs. Currently treated with oral supplementation.    BMI Findings:           Body mass index is 26.9 kg/m².       VTE Pharmacologic Prophylaxis: VTE Score: 5 High Risk (Score >/= 5) - Pharmacological DVT Prophylaxis Ordered: apixaban (Eliquis). Sequential Compression Devices Ordered.    Mobility:   Basic Mobility Inpatient Raw Score: 6  JH-HL Goal: 2: Bed activities/Dependent transfer  JH-HL Achieved: 2: Bed activities/Dependent transfer  JH-HLM  Goal achieved. Continue to encourage appropriate mobility.    Patient Centered Rounds: I performed bedside rounds with nursing staff today.   Discussions with Specialists or Other Care Team Provider: Attending physician     Education and Discussions with Family / Patient: Updated  (granddaughter) via phone.    Current Length of Stay: 4 day(s)  Current Patient Status: Inpatient   Certification Statement: The patient will continue to require additional inpatient hospital stay due to awaiting placement  Discharge Plan: Anticipate discharge tomorrow to home.    Code Status: Level 3 - DNAR and DNI    Subjective   Patient seen and examined today,    Objective :  Temp:  [98 °F (36.7 °C)-98.5 °F (36.9 °C)] 98.5 °F (36.9 °C)  HR:  [114-127] 114  BP: (118-150)/(68-91) 132/76  Resp:  [15-19] 18  SpO2:  [97 %-99 %] 97 %  O2 Device: None (Room air)    Body mass index is 26.9 kg/m².     Input and Output Summary (last 24 hours):     Intake/Output Summary (Last 24 hours) at 10/30/2024 1815  Last data filed at 10/30/2024 1143  Gross per 24 hour   Intake 420 ml   Output 375 ml   Net 45 ml       Physical Exam  Musculoskeletal:      Comments: Sacral ulcer         Lines/Drains:  Lines/Drains/Airways       Active Status       Name Placement date Placement time Site Days    Urethral Catheter Non-latex;Temperature probe 16 Fr. 10/26/24  2104  Non-latex;Temperature probe  3                  Urinary Catheter:  Goal for removal: N/A- Discharging with Chung                 Lab Results: I have reviewed the following results:   Results from last 7 days   Lab Units 10/29/24  1039   WBC Thousand/uL 8.30   HEMOGLOBIN g/dL 8.3*   HEMATOCRIT % 28.2*   PLATELETS Thousands/uL 335   SEGS PCT % 69   LYMPHO PCT % 18   MONO PCT % 7   EOS PCT % 4     Results from last 7 days   Lab Units 10/29/24  0411 10/28/24  0610 10/27/24  0441   SODIUM mmol/L 140   < > 145   POTASSIUM mmol/L 3.8   < > 3.0*   CHLORIDE mmol/L 107   < > 101   CO2 mmol/L 28    < > 35*   BUN mg/dL 13   < > 17   CREATININE mg/dL 0.51*   < > 0.57*   ANION GAP mmol/L 5   < > 9   CALCIUM mg/dL 7.6*   < > 8.1*   ALBUMIN g/dL  --   --  2.5*   TOTAL BILIRUBIN mg/dL  --   --  0.68   ALK PHOS U/L  --   --  63   ALT U/L  --   --  8   AST U/L  --   --  14   GLUCOSE RANDOM mg/dL 165*   < > 160*    < > = values in this interval not displayed.                 Results from last 7 days   Lab Units 10/27/24  0441 10/27/24  0132 10/26/24  2342 10/26/24  1907   LACTIC ACID mmol/L  --   --  2.0 2.8*   PROCALCITONIN ng/ml 0.22 0.20  --   --        Recent Cultures (last 7 days):   Results from last 7 days   Lab Units 10/26/24  2233 10/26/24  1907   BLOOD CULTURE   --  No Growth at 72 hrs.   URINE CULTURE  >100,000 cfu/ml  --              Last 24 Hours Medication List:     Current Facility-Administered Medications:     acetaminophen (TYLENOL) tablet 650 mg, Q6H PRN    apixaban (ELIQUIS) tablet 2.5 mg, BID    collagenase (SANTYL) ointment, Daily    Diclofenac Sodium (VOLTAREN) 1 % topical gel 2 g, 4x Daily    metoprolol tartrate (LOPRESSOR) tablet 25 mg, Q12H ANY    pantoprazole (PROTONIX) EC tablet 40 mg, QAM    senna-docusate sodium (SENOKOT S) 8.6-50 mg per tablet 1 tablet, HS    Administrative Statements   Today, Patient Was Seen By: Maxine Cerna MD      **Please Note: This note may have been constructed using a voice recognition system.**

## 2024-10-30 NOTE — PLAN OF CARE
Pt currently tolerating dysphagia 1 diet with nectar thick liquids. Continue current diet consistency. Maintain aspiration precautions, upright ~90 degree positioning during ALL PO intake and at least 30 minutes post PO meals, small bites, slow rate of intake, and alternate liquids/solids. Medications as tolerated. Will continue to follow.

## 2024-10-30 NOTE — CASE MANAGEMENT
Case Management Progress Note    Patient name Grace García S /S -01 MRN [id number]  : 1925 Date 10/30/2024       LOS (days): 4  Geometric Mean LOS (GMLOS) (days): 5  Days to GMLOS:1.3        OBJECTIVE:        Current admission status: Inpatient  Preferred Pharmacy:   [id number] [address] [address]  Phone: [phone] Fax: [phone]    Primary Care Provider: No primary care provider on file.    Primary Insurance: MEDICARE  Secondary Insurance: [employer] INSURANCE    PROGRESS NOTE:  CM called the Pt's granddaughter Molly for a second time, requesting a call back to discuss the Pt's d/c planning and transportation.

## 2024-10-31 VITALS
WEIGHT: 143.3 LBS | DIASTOLIC BLOOD PRESSURE: 95 MMHG | RESPIRATION RATE: 14 BRPM | HEART RATE: 86 BPM | TEMPERATURE: 98.2 F | BODY MASS INDEX: 26.21 KG/M2 | OXYGEN SATURATION: 95 % | SYSTOLIC BLOOD PRESSURE: 144 MMHG

## 2024-10-31 PROBLEM — A41.9 SEPSIS (HCC): Status: RESOLVED | Noted: 2024-10-27 | Resolved: 2024-10-31

## 2024-10-31 PROBLEM — R41.82 ALTERED MENTAL STATUS: Status: RESOLVED | Noted: 2024-10-27 | Resolved: 2024-10-31

## 2024-10-31 PROBLEM — N39.0 UTI (URINARY TRACT INFECTION): Status: RESOLVED | Noted: 2024-10-27 | Resolved: 2024-10-31

## 2024-10-31 PROCEDURE — 99239 HOSP IP/OBS DSCHRG MGMT >30: CPT | Performed by: INTERNAL MEDICINE

## 2024-10-31 RX ORDER — METOPROLOL TARTRATE 25 MG/1
25 TABLET, FILM COATED ORAL EVERY 12 HOURS SCHEDULED
Qty: 30 TABLET | Refills: 0 | Status: SHIPPED | OUTPATIENT
Start: 2024-10-31

## 2024-10-31 RX ADMIN — APIXABAN 2.5 MG: 2.5 TABLET, FILM COATED ORAL at 08:07

## 2024-10-31 RX ADMIN — DICLOFENAC SODIUM TOPICAL GEL, 1% 2 G: 10 GEL TOPICAL at 11:55

## 2024-10-31 RX ADMIN — METOPROLOL TARTRATE 25 MG: 25 TABLET, FILM COATED ORAL at 08:07

## 2024-10-31 RX ADMIN — ACETAMINOPHEN 650 MG: 325 TABLET, FILM COATED ORAL at 08:07

## 2024-10-31 RX ADMIN — PANTOPRAZOLE SODIUM 40 MG: 40 TABLET, DELAYED RELEASE ORAL at 08:07

## 2024-10-31 RX ADMIN — COLLAGENASE SANTYL: 250 OINTMENT TOPICAL at 08:08

## 2024-10-31 RX ADMIN — DICLOFENAC SODIUM TOPICAL GEL, 1% 2 G: 10 GEL TOPICAL at 08:08

## 2024-10-31 NOTE — CASE MANAGEMENT
Case Management Discharge Planning Note    Patient name Grace Clayton  Location S /S -01 MRN [id number]  : 1925 Date 10/31/2024       Current Admission Date: 10/26/2024  Current Admission Diagnosis:Sepsis (HCC)   Patient Active Problem List    Diagnosis Date Noted Date Diagnosed    Palliative care by specialist 10/28/2024     Goals of care, counseling/discussion 10/28/2024     Moderate protein-calorie malnutrition (HCC) 10/28/2024     Sepsis (HCC) 10/27/2024     Elevated troponin 10/27/2024     UTI (urinary tract infection) 10/27/2024     Pressure ulcers of skin of multiple topographic sites 10/27/2024     Acute encephalopathy 10/27/2024     Dysphagia 10/27/2024     Failure to thrive in adult 10/27/2024     Anemia 01/15/2024     Suspected dementia with agitation (HCC) 2024     RSV infection with wrosening cough 2024     GERD (gastroesophageal reflux disease) 2024     Non-MI troponin elevation 2024     Generalized weakness 2023     Paroxysmal atrial fibrillation (HCC) 2023     Ambulatory dysfunction 2023     Anxiety 2023     Hyperlipidemia 2012     Essential hypertension 2012       LOS (days): 5  Geometric Mean LOS (GMLOS) (days): 5  Days to GMLOS:0.5     OBJECTIVE:  Risk of Unplanned Readmission Score: 16         Current admission status: Inpatient   Preferred Pharmacy:   [id number] [address] [address]  Phone: [phone] Fax: [phone]    Primary Care Provider: No primary care provider on file.    Primary Insurance: MEDICARE  Secondary Insurance: [employer]    DISCHARGE DETAILS:    Discharge planning discussed with:: Molly-granddaughter     Comments - Freedom of Choice: MAHENDRA was in contact with Molly regarding the Pt's readiness for d/c to home today which she reported being agreeable to and request transport be arranged to have the Pt home  between 2-2:30pm by [facility] Gina who is familiar with the Pt and her needs.                Contacts  Patient Contacts: Molly  Relationship to Patient:: Family  Contact Method: Phone  Phone Number: [phone]  Reason/Outcome: Discharge Planning         Treatment Team Recommendation: Home with Home Health Care  Discharge Destination Plan:: Home with Home Health Care  Transport at Discharge : [facility]     Number/Name of Dispatcher: [facility] [id number]  Transported by (Company and Unit #): [facility]  ETA of Transport (Date): 10/31/24  ETA of Transport (Time): 1300       IMM Given (Date):: 10/31/24  IMM Given to:: Family  Family notified:: CM reviewed the Pt's Medicare Rights with her granddaughter via phone

## 2024-10-31 NOTE — PLAN OF CARE
Problem: Prexisting or High Potential for Compromised Skin Integrity  Goal: Skin integrity is maintained or improved  Description: INTERVENTIONS:  - Identify patients at risk for skin breakdown  - Assess and monitor skin integrity  - Assess and monitor nutrition and hydration status  - Monitor labs   - Assess for incontinence   - Turn and reposition patient  - Assist with mobility/ambulation  - Relieve pressure over bony prominences  - Avoid friction and shearing  - Provide appropriate hygiene as needed including keeping skin clean and dry  - Evaluate need for skin moisturizer/barrier cream  - Collaborate with interdisciplinary team   - Patient/family teaching  - Consider wound care consult   Outcome: Progressing     Problem: GENITOURINARY - ADULT  Goal: Maintains or returns to baseline urinary function  Description: INTERVENTIONS:  - Assess urinary function  - Encourage oral fluids to ensure adequate hydration if ordered  - Administer IV fluids as ordered to ensure adequate hydration  - Administer ordered medications as needed  - Offer frequent toileting  - Follow urinary retention protocol if ordered  Outcome: Progressing     Problem: INFECTION - ADULT  Goal: Absence or prevention of progression during hospitalization  Description: INTERVENTIONS:  - Assess and monitor for signs and symptoms of infection  - Monitor lab/diagnostic results  - Monitor all insertion sites, i.e. indwelling lines, tubes, and drains  - Monitor endotracheal if appropriate and nasal secretions for changes in amount and color  - Olds appropriate cooling/warming therapies per order  - Administer medications as ordered  - Instruct and encourage patient and family to use good hand hygiene technique  - Identify and instruct in appropriate isolation precautions for identified infection/condition  Outcome: Progressing

## 2024-10-31 NOTE — ASSESSMENT & PLAN NOTE
Urinalysis positive for leukocytes, bacteria, blood, positive nitrites  Presented with altered mental status  No suprapubic tenderness  Met sepsis criteria  Urine culture grows 80,000-89,000 CFU per mL Citrobacter freundii and 40,000-49,000 CFU per mL Pseudomonas aeruginosa    Plan  IV cefepime 1 g daily  On the plan under sepsis

## 2024-10-31 NOTE — DISCHARGE SUMMARY
Discharge Summary - Hospitalist   Name: Grace Clayton 89 y.o. female I MRN: [id number]  Unit/Bed#: S -01 I Date of Admission: 10/26/2024   Date of Service: 10/31/2024 I Hospital Day: 5     Assessment & Plan  Sepsis (HCC) (Resolved: 10/31/2024)  Met sepsis criteria with tachycardia, leukocytosis of 21K, lactic acidosis and suspected source of infection being urinary tract infection  Status post 1.5 L bolus of Isolyte in the ED  Received 1 dose of IV cefepime in the ED  She has multiple pressure wounds including a sacral ulcers-does not look infected at this time  Sacral decubitus ulcer. Cortical bony erosion consistent with osteomyelitis of the lower sacrum and upper coccyx.  Moderate bilateral pleural effusions with overlying compressive atelectasis.  Constipation with very large bolus of stool in the rectum and mild stercoral proctitis.  Blood cultures no growth at 24 hours.  CBC improved from 13 to 8     Plan  Gentle IV hydration  Stop IV antibiotics.  Convert metoprolol to oral increase to 25 mg BID .  Stop Lovenox therapeutic and resume Eliquis as patient cannot take orals.    UTI (urinary tract infection) (Resolved: 10/31/2024)  Urinalysis positive for leukocytes, bacteria, blood, positive nitrites  Presented with altered mental status  No suprapubic tenderness  Met sepsis criteria  Urine culture grows 80,000-89,000 CFU per mL Citrobacter freundii and 40,000-49,000 CFU per mL Pseudomonas aeruginosa    Plan  IV cefepime 1 g daily  On the plan under sepsis  Acute encephalopathy (Resolved: 10/31/2024)  Metabolic encephalopathy secondary to UTI  Patient has history of dementia with behavioral disturbance  Per granddaughter patient was at baseline mental status until today morning.  Morning patient's mental status patient was not, started repeating words, worsening swallow difficulty  She has dysphagia and baseline oral intake has been very poor for the past 2 weeks, was just taking Ensure  In the ED, met sepsis  criteria, with urinalysis suggestive of UTI, status post IV fluid resuscitation and IV cefepime  Acute encephalopathy possibly secondary to UTI or other infection versus worsening dementia versus stroke versus delirium  Plan  CT head to rule out stroke or other intracranial abnormality due to acute onset of dysphagia and repeat words  Treatment for UTI  Ammonia levels  Monitor electrolytes  Delirium precautions  Fall precautions  Consider geriatrics consult  Hyperlipidemia  History of hyperlipidemia, patient was supposed to be on pravastatin 20 mg daily-has not taken these medications for the past 2 weeks  Essential hypertension  History of hypertension and A-fib, supposed to be on Lopressor 12.5 mg twice daily-has not been taking this medication for the past 2 weeks  We will continue Lopressor 12.5 mg daily  Paroxysmal atrial fibrillation (HCC)  On admission, patient was in A-fib with RVR  Has been off Eliquis and Lopressor for the past 2 weeks   Received 1 dose of IV Lopressor 5 mg in the ED after which the heart rates back to normal limits  Plan  Restart Eliquis 2.5 mg twice daily and Lopressor 12.5 mg twice daily  Continue to monitor heart rates  Elevated troponin  Patient presented with altered mental status and poor oral intake  No chest pain  Troponin levels in the ED elevated at-0 hour at 109, 2 hr at 101 ,4 hr 113  and delta-29  No ischemic changes on the EKG  BNP at 591 but patient looks very dry and no signs of volume overload-last echo in February 2024 showed ejection fraction of 70% days with normal systolic function, mild concentric hypertrophy  Has had a history of non MI troponin elevation in the past  Likely related to A-fib with RVR causing non-MI troponin elevation  Plan  Continue to monitor heart rates and keep the patient on telemetry   monitor for symptoms    Ambulatory dysfunction  Bedbound  PT/OT evaluation  Pressure ulcers of skin of multiple topographic sites  Stage IV sacral wound with  bone exposure underwent wound debridement.  Patient has multiple pressure ulcers including a sacral wound  Does not appear infected  However presented with sepsis criteria likely secondary to UTI  Plan  On IV ceftriaxone currently for the UTI  Wound cultures  Wound care consult  Follow-up CT abdomen pelvis    Dysphagia  Patient has history on dysphagia, per chart review patient was on puréed diet with thin liquid  Nursing dysphagia screen was done with dysphagia diet-nurse report patient is unable to swallow  Plan  Will keep the patient n.p.o. for now  Follow-up on CT head for any acute intracranial changes  Speech and swallow evaluation  Failure to thrive in adult  Daughter reports that the patient has been having poor oral intake for the past 2 weeks and has been weak and fatigued  She has been just drinking her Ensure but with poor oral intake  History of dysphagia  BMI within normal limits but very dry on physical exam  Plan   nutrition supplements   speech and swallow evaluation  Inpatient consult to nutrition for further recommendations  Palliative care by specialist    Goals of care, counseling/discussion    Moderate protein-calorie malnutrition (HCC)  Malnutrition Findings:   Adult Malnutrition type: Acute illness  Adult Degree of Malnutrition: Malnutrition of moderate degree  Malnutrition Characteristics: Fat loss, Muscle loss                  360 Statement: Moderate malnutrition related to inadequate oral intake as evidenced by subcutaneous fat and muscle loss extremities, temples, orbitals; < 50 -75% estimated nutritional needs. Currently treated with oral supplementation.    BMI Findings:           Body mass index is 26.21 kg/m².        Medical Problems       Resolved Problems  Date Reviewed: 2/21/2024   None       Discharging Physician / Practitioner: Maxine Cerna MD  PCP: No primary care provider on file.  Admission Date:   Admission Orders (From admission, onward)       Ordered        10/26/24 2244   INPATIENT ADMISSION  Once                          Discharge Date: 10/31/24    Consultations During Hospital Stay:  None    Procedures Performed:   Sacral wound osteomyelitis    Significant Findings / Test Results:   None    Incidental Findings:   None      Test Results Pending at Discharge (will require follow up):   None      Outpatient Tests Requested:  None    Complications:  None     Reason for Admission: UTI/sacral ulcer with osteomyelitis    Hospital Course:   Grace Clayton is a 89 y.o. female with a PMH of afib , dementia with agitation, sacral wound, GERD, hypertension, hyperlipidemia, anxiety who presents from home with 1 day history of altered mental status and 2 weeks history of poor oral intake and increased fatigue.  2 weeks prior to admission patient had been admitted to [facility], condition deteriorated after discharge, granddaughter noticed patient has a decline in the mental status as well which prompted her admission to the hospital.  Urinalysis large ketones, blood, positive nitrites, protein +1, innumerable white blood cells, urine culture positive for mixed axel Citrobacter/Pseudomonas, sacral wound ulcer seen on CAT scan shows concerning features for osteomyelitis, was debrided by general surgery.  Patient was placed on IV ceftriaxone which was switched to cefepime 2 days after admission after Pseudomonas was cultured in the urine she received a total of 5-day course antibiotic most likely source of sepsis/metabolic encephalopathy was due to UTI.  Patient admitted initially was not able to take oral medications nor take any food, however at point of discharge she is becoming very stable taking medications eating and became more alert but still remain AO x 1.  Metoprolol dose was increased from 12.5 mg twice daily to 25 mg twice daily as she was found to be in A-fib tachycardia.  She had hypokalemia/leukocytosis which all resolved at time of discharge., she has an Hb of 8.3.  Patient will be  discharged home with [facility]NA for proper wound care wound care instructions has been documented by GEN surge/wound care specialist.    CT brain normal, CT Chest/Abd/pelvis  Sacral decubitus ulcer. Cortical bony erosion consistent with osteomyelitis of the lower sacrum and upper coccyx. Moderate bilateral pleural effusions with overlying compressive atelectasis.  Constipation with very large bolus of stool in the rectum and mild stercoral proctitis.   Please see above list of diagnoses and related plan for additional information.     Condition at Discharge: fair    Discharge Day Visit / Exam:   Subjective:  patient seen and examined  Vitals: Blood Pressure: 144/95 (10/31/24 0744)  Pulse: 86 (10/31/24 0744)  Temperature: 98.2 °F (36.8 °C) (10/31/24 0744)  Temp Source: Axillary (10/30/24 1429)  Respirations: 14 (10/31/24 0744)  Weight - Scale: 65 kg (143 lb 4.8 oz) (10/31/24 0541)  SpO2: 95 % (10/31/24 0744)  Physical Exam  Vitals and nursing note reviewed.   Constitutional:       General: She is not in acute distress.     Appearance: She is well-developed.      Comments: Sacral ulcer   HENT:      Head: Normocephalic and atraumatic.   Eyes:      Conjunctiva/sclera: Conjunctivae normal.   Cardiovascular:      Rate and Rhythm: Normal rate and regular rhythm.      Heart sounds: No murmur heard.  Pulmonary:      Effort: Pulmonary effort is normal. No respiratory distress.      Breath sounds: Normal breath sounds.   Abdominal:      Palpations: Abdomen is soft.      Tenderness: There is no abdominal tenderness.   Musculoskeletal:         General: No swelling.      Cervical back: Neck supple.   Skin:     General: Skin is warm and dry.      Capillary Refill: Capillary refill takes less than 2 seconds.   Neurological:      Mental Status: She is alert.   Psychiatric:         Mood and Affect: Mood normal.        Discussion with Family: Updated  (granddaughter: Molly) via phone.    Discharge instructions/Information to  patient and family:   See after visit summary for information provided to patient and family.      Provisions for Follow-Up Care:  See after visit summary for information related to follow-up care and any pertinent home health orders.      Mobility at time of Discharge:   Basic Mobility Inpatient Raw Score: 6  JH-HLM Goal: 2: Bed activities/Dependent transfer  JH-HLM Achieved: 2: Bed activities/Dependent transfer  HLM Goal achieved. Continue to encourage appropriate mobility.     Disposition:   Home with VNA Services (Reminder: Complete face to face encounter)    Planned Readmission: NO    Discharge Medications:  See after visit summary for reconciled discharge medications provided to patient and/or family.      Administrative Statements   Discharge Statement:  I have spent a total time of 30 minutes in caring for this patient on the day of the visit/encounter. .    **Please Note: This note may have been constructed using a voice recognition system**

## 2024-10-31 NOTE — ASSESSMENT & PLAN NOTE
Stage IV sacral wound with bone exposure underwent wound debridement.  Patient has multiple pressure ulcers including a sacral wound  Does not appear infected  However presented with sepsis criteria likely secondary to UTI  Plan  On IV ceftriaxone currently for the UTI  Wound cultures  Wound care consult  Follow-up CT abdomen pelvis

## 2024-10-31 NOTE — PLAN OF CARE
Problem: Prexisting or High Potential for Compromised Skin Integrity  Goal: Skin integrity is maintained or improved  Description: INTERVENTIONS:  - Identify patients at risk for skin breakdown  - Assess and monitor skin integrity  - Assess and monitor nutrition and hydration status  - Monitor labs   - Assess for incontinence   - Turn and reposition patient  - Assist with mobility/ambulation  - Relieve pressure over bony prominences  - Avoid friction and shearing  - Provide appropriate hygiene as needed including keeping skin clean and dry  - Evaluate need for skin moisturizer/barrier cream  - Collaborate with interdisciplinary team   - Patient/family teaching  - Consider wound care consult   10/31/2024 0757 by Edwina Sanchez RN  Outcome: Progressing  10/31/2024 0756 by Edwina Sanchez RN  Outcome: Progressing     Problem: GENITOURINARY - ADULT  Goal: Maintains or returns to baseline urinary function  Description: INTERVENTIONS:  - Assess urinary function  - Encourage oral fluids to ensure adequate hydration if ordered  - Administer IV fluids as ordered to ensure adequate hydration  - Administer ordered medications as needed  - Offer frequent toileting  - Follow urinary retention protocol if ordered  10/31/2024 0757 by Edwina Sanchez RN  Outcome: Progressing  10/31/2024 0756 by Edwina Sanchez RN  Outcome: Progressing  Goal: Urinary catheter remains patent  Description: INTERVENTIONS:  - Assess patency of urinary catheter  - If patient has a chronic carcamo, consider changing catheter if non-functioning  - Follow guidelines for intermittent irrigation of non-functioning urinary catheter  10/31/2024 0757 by Edwina Sanchez RN  Outcome: Progressing  10/31/2024 0756 by Edwina Sanchez RN  Outcome: Progressing     Problem: INFECTION - ADULT  Goal: Absence or prevention of progression during hospitalization  Description: INTERVENTIONS:  - Assess and monitor for signs and symptoms of infection  - Monitor  lab/diagnostic results  - Monitor all insertion sites, i.e. indwelling lines, tubes, and drains  - Monitor endotracheal if appropriate and nasal secretions for changes in amount and color  - Lima appropriate cooling/warming therapies per order  - Administer medications as ordered  - Instruct and encourage patient and family to use good hand hygiene technique  - Identify and instruct in appropriate isolation precautions for identified infection/condition  10/31/2024 0757 by Edwina Sanchez RN  Outcome: Progressing  10/31/2024 0756 by Edwina Sanchez RN  Outcome: Progressing  Goal: Absence of fever/infection during neutropenic period  Description: INTERVENTIONS:  - Monitor WBC    10/31/2024 0757 by Edwina Sanchez RN  Outcome: Progressing  10/31/2024 0756 by Edwina Sanchez RN  Outcome: Progressing     Problem: Nutrition/Hydration-ADULT  Goal: Nutrient/Hydration intake appropriate for improving, restoring or maintaining nutritional needs  Description: Monitor and assess patient's nutrition/hydration status for malnutrition. Collaborate with interdisciplinary team and initiate plan and interventions as ordered.  Monitor patient's weight and dietary intake as ordered or per policy. Utilize nutrition screening tool and intervene as necessary. Determine patient's food preferences and provide high-protein, high-caloric foods as appropriate.     INTERVENTIONS:  - Monitor oral intake, urinary output, labs, and treatment plans  - Assess nutrition and hydration status and recommend course of action  - Evaluate amount of meals eaten  - Assist patient with eating if necessary   - Allow adequate time for meals  - Recommend/ encourage appropriate diets, oral nutritional supplements, and vitamin/mineral supplements  - Order, calculate, and assess calorie counts as needed  - Recommend, monitor, and adjust tube feedings and TPN/PPN based on assessed needs  - Assess need for intravenous fluids  - Provide specific  nutrition/hydration education as appropriate  - Include patient/family/caregiver in decisions related to nutrition  10/31/2024 0757 by Edwina Sanchez RN  Outcome: Progressing  10/31/2024 0756 by Edwina Sanchez RN  Outcome: Progressing     Problem: Potential for Falls  Goal: Patient will remain free of falls  Description: INTERVENTIONS:  - Educate patient/family on patient safety including physical limitations  - Instruct patient to call for assistance with activity   - Consult OT/PT to assist with strengthening/mobility   - Keep Call bell within reach  - Keep bed low and locked with side rails adjusted as appropriate  - Keep care items and personal belongings within reach  - Initiate and maintain comfort rounds  - Make Fall Risk Sign visible to staff  - Offer Toileting every  Hours, in advance of need  - Initiate/Maintain alarm  - Obtain necessary fall risk management equipment  - Apply yellow socks and bracelet for high fall risk patients  - Consider moving patient to room near nurses station  10/31/2024 0757 by Edwina Sanchez RN  Outcome: Progressing  10/31/2024 0756 by Edwina Sanchez RN  Outcome: Progressing

## 2024-10-31 NOTE — ASSESSMENT & PLAN NOTE
Met sepsis criteria with tachycardia, leukocytosis of 21K, lactic acidosis and suspected source of infection being urinary tract infection  Status post 1.5 L bolus of Isolyte in the ED  Received 1 dose of IV cefepime in the ED  She has multiple pressure wounds including a sacral ulcers-does not look infected at this time  Sacral decubitus ulcer. Cortical bony erosion consistent with osteomyelitis of the lower sacrum and upper coccyx.  Moderate bilateral pleural effusions with overlying compressive atelectasis.  Constipation with very large bolus of stool in the rectum and mild stercoral proctitis.  Blood cultures no growth at 24 hours.  CBC improved from 13 to 8     Plan  Gentle IV hydration  Stop IV antibiotics.  Convert metoprolol to oral increase to 25 mg BID .  Stop Lovenox therapeutic and resume Eliquis as patient cannot take orals.

## 2024-10-31 NOTE — ASSESSMENT & PLAN NOTE
Metabolic encephalopathy secondary to UTI  Patient has history of dementia with behavioral disturbance  Per granddaughter patient was at baseline mental status until today morning.  Morning patient's mental status patient was not, started repeating words, worsening swallow difficulty  She has dysphagia and baseline oral intake has been very poor for the past 2 weeks, was just taking Ensure  In the ED, met sepsis criteria, with urinalysis suggestive of UTI, status post IV fluid resuscitation and IV cefepime  Acute encephalopathy possibly secondary to UTI or other infection versus worsening dementia versus stroke versus delirium  Plan  CT head to rule out stroke or other intracranial abnormality due to acute onset of dysphagia and repeat words  Treatment for UTI  Ammonia levels  Monitor electrolytes  Delirium precautions  Fall precautions  Consider geriatrics consult

## 2024-10-31 NOTE — ASSESSMENT & PLAN NOTE
Malnutrition Findings:   Adult Malnutrition type: Acute illness  Adult Degree of Malnutrition: Malnutrition of moderate degree  Malnutrition Characteristics: Fat loss, Muscle loss                  360 Statement: Moderate malnutrition related to inadequate oral intake as evidenced by subcutaneous fat and muscle loss extremities, temples, orbitals; < 50 -75% estimated nutritional needs. Currently treated with oral supplementation.    BMI Findings:           Body mass index is 26.21 kg/m².

## 2024-11-01 LAB — BACTERIA BLD CULT: NORMAL

## 2024-11-12 ENCOUNTER — TELEPHONE (OUTPATIENT)
Age: 89
End: 2024-11-12

## 2024-11-12 NOTE — TELEPHONE ENCOUNTER
Molly was calling in to get some more information about Palliative care at home did give her website of www.findhelp.org to find services in her area and also let her know to reach out to pt's PCP as well but she is very unhappy with pt current care at [facility]. She would just like to know if there are any advice or services possibly known in her area.  There is a referral in her chart but it is denied at this time. Molly can be reached at [phone]. Thank you.

## 2024-11-13 NOTE — TELEPHONE ENCOUNTER
Left message with the names and numbers of the Palliative Care Locations in her area. Provided [facility] number to call with any questions.

## 2024-12-06 ENCOUNTER — APPOINTMENT (EMERGENCY)
Dept: RADIOLOGY | Facility: HOSPITAL | Age: 89
End: 2024-12-06
Payer: MEDICARE

## 2024-12-06 ENCOUNTER — HOSPITAL ENCOUNTER (INPATIENT)
Facility: HOSPITAL | Age: 89
LOS: 7 days | Discharge: HOME HEALTH | End: 2024-12-13
Attending: EMERGENCY MEDICINE | Admitting: INTERNAL MEDICINE
Payer: MEDICARE

## 2024-12-06 DIAGNOSIS — N39.0 UTI (URINARY TRACT INFECTION): Primary | ICD-10-CM

## 2024-12-06 DIAGNOSIS — I48.91 A-FIB (HCC): ICD-10-CM

## 2024-12-06 DIAGNOSIS — L89.90 PRESSURE ULCERS OF SKIN OF MULTIPLE TOPOGRAPHIC SITES: ICD-10-CM

## 2024-12-06 DIAGNOSIS — T83.511D URINARY TRACT INFECTION ASSOCIATED WITH INDWELLING URETHRAL CATHETER, SUBSEQUENT ENCOUNTER: ICD-10-CM

## 2024-12-06 DIAGNOSIS — I48.0 PAROXYSMAL ATRIAL FIBRILLATION (HCC): ICD-10-CM

## 2024-12-06 DIAGNOSIS — R26.2 AMBULATORY DYSFUNCTION: ICD-10-CM

## 2024-12-06 DIAGNOSIS — N39.0 URINARY TRACT INFECTION ASSOCIATED WITH INDWELLING URETHRAL CATHETER, SUBSEQUENT ENCOUNTER: ICD-10-CM

## 2024-12-06 LAB
ALBUMIN SERPL BCG-MCNC: 2.8 G/DL (ref 3.5–5)
ALP SERPL-CCNC: 73 U/L (ref 34–104)
ALT SERPL W P-5'-P-CCNC: 6 U/L (ref 7–52)
ANION GAP SERPL CALCULATED.3IONS-SCNC: 13 MMOL/L (ref 4–13)
APTT PPP: 23 SECONDS (ref 23–34)
AST SERPL W P-5'-P-CCNC: 20 U/L (ref 13–39)
BACTERIA UR QL AUTO: ABNORMAL /HPF
BILIRUB SERPL-MCNC: 1 MG/DL (ref 0.2–1)
BILIRUB UR QL STRIP: NEGATIVE
BUN SERPL-MCNC: 13 MG/DL (ref 5–25)
CALCIUM ALBUM COR SERPL-MCNC: 10 MG/DL (ref 8.3–10.1)
CALCIUM SERPL-MCNC: 9 MG/DL (ref 8.4–10.2)
CHLORIDE SERPL-SCNC: 97 MMOL/L (ref 96–108)
CLARITY UR: ABNORMAL
CO2 SERPL-SCNC: 32 MMOL/L (ref 21–32)
COLOR UR: ABNORMAL
CREAT SERPL-MCNC: 0.65 MG/DL (ref 0.6–1.3)
GFR SERPL CREATININE-BSD FRML MDRD: 73 ML/MIN/1.73SQ M
GLUCOSE SERPL-MCNC: 165 MG/DL (ref 65–140)
GLUCOSE UR STRIP-MCNC: NEGATIVE MG/DL
HGB UR QL STRIP.AUTO: ABNORMAL
INR PPP: 1.49 (ref 0.85–1.19)
KETONES UR STRIP-MCNC: ABNORMAL MG/DL
LACTATE SERPL-SCNC: 1.3 MMOL/L (ref 0.5–2)
LACTATE SERPL-SCNC: 4.7 MMOL/L (ref 0.5–2)
LEUKOCYTE ESTERASE UR QL STRIP: ABNORMAL
MUCOUS THREADS UR QL AUTO: ABNORMAL
NITRITE UR QL STRIP: POSITIVE
NON-SQ EPI CELLS URNS QL MICRO: ABNORMAL /HPF
PH UR STRIP.AUTO: 7 [PH]
POTASSIUM SERPL-SCNC: 3.5 MMOL/L (ref 3.5–5.3)
PROCALCITONIN SERPL-MCNC: 0.15 NG/ML
PROT SERPL-MCNC: 6.9 G/DL (ref 6.4–8.4)
PROT UR STRIP-MCNC: ABNORMAL MG/DL
PROTHROMBIN TIME: 18.8 SECONDS (ref 12.3–15)
RBC #/AREA URNS AUTO: ABNORMAL /HPF
SODIUM SERPL-SCNC: 142 MMOL/L (ref 135–147)
SP GR UR STRIP.AUTO: 1.02 (ref 1–1.03)
UROBILINOGEN UR STRIP-ACNC: 2 MG/DL
WBC #/AREA URNS AUTO: ABNORMAL /HPF
WBC CLUMPS # UR AUTO: PRESENT /UL

## 2024-12-06 PROCEDURE — 81001 URINALYSIS AUTO W/SCOPE: CPT

## 2024-12-06 PROCEDURE — 36415 COLL VENOUS BLD VENIPUNCTURE: CPT

## 2024-12-06 PROCEDURE — 99285 EMERGENCY DEPT VISIT HI MDM: CPT

## 2024-12-06 PROCEDURE — 87147 CULTURE TYPE IMMUNOLOGIC: CPT

## 2024-12-06 PROCEDURE — 85730 THROMBOPLASTIN TIME PARTIAL: CPT

## 2024-12-06 PROCEDURE — 96365 THER/PROPH/DIAG IV INF INIT: CPT

## 2024-12-06 PROCEDURE — 87040 BLOOD CULTURE FOR BACTERIA: CPT

## 2024-12-06 PROCEDURE — 87086 URINE CULTURE/COLONY COUNT: CPT

## 2024-12-06 PROCEDURE — 84145 PROCALCITONIN (PCT): CPT

## 2024-12-06 PROCEDURE — 99291 CRITICAL CARE FIRST HOUR: CPT | Performed by: EMERGENCY MEDICINE

## 2024-12-06 PROCEDURE — 71045 X-RAY EXAM CHEST 1 VIEW: CPT

## 2024-12-06 PROCEDURE — 87186 SC STD MICRODIL/AGAR DIL: CPT

## 2024-12-06 PROCEDURE — 85610 PROTHROMBIN TIME: CPT

## 2024-12-06 PROCEDURE — 99222 1ST HOSP IP/OBS MODERATE 55: CPT | Performed by: NURSE PRACTITIONER

## 2024-12-06 PROCEDURE — 80053 COMPREHEN METABOLIC PANEL: CPT

## 2024-12-06 PROCEDURE — 96361 HYDRATE IV INFUSION ADD-ON: CPT

## 2024-12-06 PROCEDURE — 87154 CUL TYP ID BLD PTHGN 6+ TRGT: CPT

## 2024-12-06 PROCEDURE — 83605 ASSAY OF LACTIC ACID: CPT

## 2024-12-06 PROCEDURE — 87077 CULTURE AEROBIC IDENTIFY: CPT

## 2024-12-06 PROCEDURE — 87205 SMEAR GRAM STAIN: CPT

## 2024-12-06 PROCEDURE — 87070 CULTURE OTHR SPECIMN AEROBIC: CPT

## 2024-12-06 RX ORDER — GABAPENTIN 100 MG/1
100 CAPSULE ORAL 2 TIMES DAILY PRN
COMMUNITY
End: 2024-12-13

## 2024-12-06 RX ORDER — ACETAMINOPHEN 325 MG/1
650 TABLET ORAL EVERY 6 HOURS PRN
Status: DISCONTINUED | OUTPATIENT
Start: 2024-12-06 | End: 2024-12-12

## 2024-12-06 RX ORDER — DORZOLAMIDE HYDROCHLORIDE AND TIMOLOL MALEATE 20; 5 MG/ML; MG/ML
1 SOLUTION/ DROPS OPHTHALMIC 2 TIMES DAILY
Status: DISCONTINUED | OUTPATIENT
Start: 2024-12-06 | End: 2024-12-13 | Stop reason: HOSPADM

## 2024-12-06 RX ORDER — SENNOSIDES 8.6 MG
650 CAPSULE ORAL EVERY 8 HOURS PRN
COMMUNITY

## 2024-12-06 RX ORDER — ESCITALOPRAM OXALATE 10 MG/1
10 TABLET ORAL DAILY
Status: DISCONTINUED | OUTPATIENT
Start: 2024-12-07 | End: 2024-12-13 | Stop reason: HOSPADM

## 2024-12-06 RX ORDER — METOPROLOL TARTRATE 25 MG/1
25 TABLET, FILM COATED ORAL EVERY 12 HOURS SCHEDULED
Status: DISCONTINUED | OUTPATIENT
Start: 2024-12-06 | End: 2024-12-13 | Stop reason: HOSPADM

## 2024-12-06 RX ORDER — PRAVASTATIN SODIUM 20 MG
20 TABLET ORAL
Status: DISCONTINUED | OUTPATIENT
Start: 2024-12-07 | End: 2024-12-13 | Stop reason: HOSPADM

## 2024-12-06 RX ORDER — BISACODYL 10 MG
10 SUPPOSITORY, RECTAL RECTAL DAILY PRN
Status: DISCONTINUED | OUTPATIENT
Start: 2024-12-06 | End: 2024-12-13 | Stop reason: HOSPADM

## 2024-12-06 RX ORDER — LATANOPROST 50 UG/ML
1 SOLUTION/ DROPS OPHTHALMIC
Status: DISCONTINUED | OUTPATIENT
Start: 2024-12-06 | End: 2024-12-13 | Stop reason: HOSPADM

## 2024-12-06 RX ORDER — ESCITALOPRAM OXALATE 10 MG/1
10 TABLET ORAL DAILY
COMMUNITY
End: 2024-12-13

## 2024-12-06 RX ORDER — BISACODYL 10 MG
10 SUPPOSITORY, RECTAL RECTAL DAILY PRN
COMMUNITY

## 2024-12-06 RX ADMIN — SODIUM CHLORIDE 1000 ML: 0.9 INJECTION, SOLUTION INTRAVENOUS at 16:46

## 2024-12-06 RX ADMIN — METOPROLOL TARTRATE 25 MG: 25 TABLET, FILM COATED ORAL at 22:09

## 2024-12-06 RX ADMIN — SODIUM CHLORIDE 900 ML: 0.9 INJECTION, SOLUTION INTRAVENOUS at 17:56

## 2024-12-06 RX ADMIN — CEFTRIAXONE 1000 MG: 2 INJECTION, POWDER, FOR SOLUTION INTRAMUSCULAR; INTRAVENOUS at 18:46

## 2024-12-06 RX ADMIN — Medication 3 MG: at 22:09

## 2024-12-06 NOTE — ED PROVIDER NOTES
Time reflects when diagnosis was documented in both MDM as applicable and the Disposition within this note       Time User Action Codes Description Comment    12/6/2024  7:31 PM Satya Phelps Add [N39.0] UTI (urinary tract infection)           ED Disposition       ED Disposition   Admit    Condition   Stable    Date/Time   Fri Dec 6, 2024  7:32 PM    Comment   Case was discussed with  and the patient's admission status was agreed to be Admission Status: inpatient status to the service of Dr. Marquez.               Assessment & Plan       Medical Decision Making  89-year-old female with past medical history of hypertension, hyperlipidemia, dementia presenting for altered mental status.    Differential diagnoses include not limited to: Sepsis, urinary tract infection, sacral wound infection, hypoglycemia, cardiac arrhythmia, electrolyte abnormality    Initial workup to include CBC, CMP, UA, lactate, blood cultures, procalcitonin, coags, chest x-ray, wound culture for sacrum    CMP unremarkable.  Lactate significantly elevated at 4.7 with urinalysis concerning for urinary tract infection given elevated leukocytes, nitrites and bacteria noted on microscopy.  CBC pending.  Patient given 30 cc/kg of IV fluids and started on empiric antibiotic treatment with ceftriaxone.  Discussed case with Clay who agreed to admission for altered mental status likely secondary to urinary tract infection.    Amount and/or Complexity of Data Reviewed  Labs: ordered. Decision-making details documented in ED Course.  Radiology: ordered and independent interpretation performed. Decision-making details documented in ED Course.    Risk  Decision regarding hospitalization.        ED Course as of 12/06/24 2029   Fri Dec 06, 2024   1701 Comprehensive metabolic panel(!)  Unremarkable   1702 XR chest 1 view portable  Improved from previous, no new areas of consolidation concerning for pneumonia.  No additional acute cardiopulmonary abnormalities  noted per my interpretation   1732 LACTIC ACID(!!): 4.7   1800 Discussed case with patient's granddaughter Destiny over the phone.  States that patient is normally conversational but often gets confused with mild agitation.  Believes that symptoms have began to worsen over the course of the week and patient becomes more agitated than normal, though without any new complaints.   1859 UA w Reflex to Microscopic w Reflex to Culture(!)  Positive leukocytes and nitrates concerning for urinary tract infection.  Patient currently on empiric therapy with ceftriaxone   1914 Bacteria, UA(!): Innumerable   1914 WBC, UA(!): Innumerable       Medications   sodium chloride 0.9 % bolus 1,000 mL (0 mL Intravenous Stopped 12/6/24 1844)   ceftriaxone (ROCEPHIN) 1 g/50 mL in dextrose IVPB (0 mg Intravenous Stopped 12/6/24 1916)   sodium chloride 0.9 % bolus 900 mL (0 mL Intravenous Stopped 12/6/24 1844)       ED Risk Strat Scores                           SBIRT 20yo+      Flowsheet Row Most Recent Value   Initial Alcohol Screen: US AUDIT-C     1. How often do you have a drink containing alcohol? 0 Filed at: 12/06/2024 1515   2. How many drinks containing alcohol do you have on a typical day you are drinking?  0 Filed at: 12/06/2024 1515   3a. Male UNDER 65: How often do you have five or more drinks on one occasion? 0 Filed at: 12/06/2024 1515   3b. FEMALE Any Age, or MALE 65+: How often do you have 4 or more drinks on one occassion? 0 Filed at: 12/06/2024 1515   Audit-C Score 0 Filed at: 12/06/2024 1515   GRETEL: How many times in the past year have you...    Used an illegal drug or used a prescription medication for non-medical reasons? Never Filed at: 12/06/2024 1515                            History of Present Illness       Chief Complaint   Patient presents with    Possible UTI     Patient arrived from home. Dx of dementia. Patient fmaily states more confused, worried for UTI. Patient has chronic carcamo. Hasn't been changed in 7  "weeks.,        Past Medical History:   Diagnosis Date    Atrial fibrillation (HCC)     Dementia (HCC)     Hypertension       History reviewed. No pertinent surgical history.   History reviewed. No pertinent family history.   Social History     Tobacco Use    Smoking status: Never    Smokeless tobacco: Never   Substance Use Topics    Alcohol use: Never    Drug use: Never      E-Cigarette/Vaping      E-Cigarette/Vaping Substances      I have reviewed and agree with the history as documented.     89-year-old female with past medical history of hypertension, hyperlipidemia, dementia presenting for altered mental status.  EMS reports that patient lives at home with family who state that mentation altered from baseline.  Patient has chronic indwelling Chung catheter placed which has been present for 7 weeks per EMS.  Patient afebrile upon initial evaluation and answers \"no\" to all questioning on exam including when asked her name, where she is, if anything is causing her pain or distress.  Attempted to contact patient's granddaughter who states that she does not currently live with the patient.  Notes that patient has become increasingly altered from baseline mental status with increased agitation over the course of the past week.  Does not note any additional new complaints from the patient. Family is concerned patient may have urinary tract infection.           Review of Systems   Constitutional:         Limited due to baseline dementia in addition to altered mental status           Objective       ED Triage Vitals   Temperature Pulse Blood Pressure Respirations SpO2 Patient Position - Orthostatic VS   12/06/24 1513 12/06/24 1506 12/06/24 1506 12/06/24 1506 12/06/24 1506 12/06/24 1506   97.5 °F (36.4 °C) 81 126/72 16 90 % Lying      Temp Source Heart Rate Source BP Location FiO2 (%) Pain Score    12/06/24 1513 12/06/24 1506 12/06/24 1506 -- --    Axillary Monitor Right arm        Vitals      Date and Time Temp Pulse " SpO2 Resp BP Pain Score FACES Pain Rating User   12/06/24 1930 -- 83 98 % 16 121/69 -- -- LO   12/06/24 1915 -- 82 97 % 16 143/64 -- -- LO   12/06/24 1900 -- 84 98 % 16 127/65 -- -- LO   12/06/24 1845 -- 89 97 % 16 127/64 -- -- LO   12/06/24 1830 -- 85 97 % 16 138/65 -- -- LO   12/06/24 1745 -- 91 92 % 16 91/53 -- -- LO   12/06/24 1615 -- 75 96 % 16 101/57 -- -- CR   12/06/24 1515 -- 91 92 % 16 126/72 -- -- CR   12/06/24 1513 97.5 °F (36.4 °C) -- -- -- -- -- -- CR   12/06/24 1506 -- 81 90 % 16 126/72 -- -- CR            Physical Exam  Constitutional:       Appearance: Normal appearance.   Eyes:      Extraocular Movements: Extraocular movements intact.      Conjunctiva/sclera: Conjunctivae normal.      Pupils: Pupils are equal, round, and reactive to light.   Cardiovascular:      Rate and Rhythm: Normal rate and regular rhythm.      Pulses: Normal pulses.      Heart sounds: Normal heart sounds. No murmur heard.     No friction rub. No gallop.   Pulmonary:      Effort: Pulmonary effort is normal. No respiratory distress.      Breath sounds: Normal breath sounds. No stridor. No wheezing, rhonchi or rales.   Abdominal:      General: Abdomen is flat. Bowel sounds are normal. There is no distension.      Palpations: Abdomen is soft.      Tenderness: There is no abdominal tenderness. There is no right CVA tenderness, left CVA tenderness, guarding or rebound.   Genitourinary:     Comments: Indwelling Chung catheter in place with yellow-brown discoloration and yellow/brown urine with sediment present in collection bag  Skin:     General: Skin is warm and dry.      Capillary Refill: Capillary refill takes less than 2 seconds.      Comments: Large sacral wound present with evidence of surrounding erythema and purulent discharge collection on overlying bandage.   Neurological:      General: No focal deficit present.      Mental Status: She is alert and oriented to person, place, and time. Mental status is at baseline.       Cranial Nerves: No cranial nerve deficit.      Sensory: No sensory deficit.      Motor: No weakness.      Coordination: Coordination normal.         Results Reviewed       Procedure Component Value Units Date/Time    Lactic acid 2 Hours [[id number]]  (Normal) Collected: 12/06/24 1950    Lab Status: Final result Specimen: Blood from Line, Venous Updated: 12/06/24 2025     LACTIC ACID 1.3 mmol/L     Narrative:      Result may be elevated if tourniquet was used during collection.    CBC and differential [[id number]]     Lab Status: No result Specimen: Blood     Blood culture #1 [[id number]] Resulted: 12/06/24 2012    Lab Status: Preliminary result Specimen: Blood Updated: 12/06/24 2012     Blood Culture Received in Microbiology Lab. Culture in Progress.    Urine Microscopic [[id number]]  (Abnormal) Collected: 12/06/24 1827    Lab Status: Final result Specimen: Urine, Indwelling Chung Catheter Updated: 12/06/24 1907     RBC, UA Innumerable /hpf      WBC, UA Innumerable /hpf      Epithelial Cells Occasional /hpf      Bacteria, UA Innumerable /hpf      MUCUS THREADS Moderate     WBC Clumps Present    Urine culture [[id number]] Collected: 12/06/24 1827    Lab Status: In process Specimen: Urine, Indwelling Chung Catheter Updated: 12/06/24 1907    UA w Reflex to Microscopic w Reflex to Culture [[id number]]  (Abnormal) Collected: 12/06/24 1827    Lab Status: Final result Specimen: Urine, Indwelling Chung Catheter Updated: 12/06/24 1839     Color, UA Light Orange     Clarity, UA Extra Turbid     Specific Gravity, UA 1.016     pH, UA 7.0     Leukocytes, UA Large     Nitrite, UA Positive     Protein,  (2+) mg/dl      Glucose, UA Negative mg/dl      Ketones, UA Trace mg/dl      Urobilinogen, UA 2.0 mg/dl      Bilirubin, UA Negative     Occult Blood, UA Large    Lactic acid [[id number]]  (Abnormal) Collected: 12/06/24 1654    Lab Status: Final result Specimen: Blood from Arm, Right Updated: 12/06/24 1724     LACTIC ACID 4.7 mmol/L      Narrative:      Result may be elevated if tourniquet was used during collection.    Wound culture and Gram stain [[id number]] Collected: 12/06/24 1655    Lab Status: In process Specimen: Wound from Sacrum Updated: 12/06/24 1701    Procalcitonin [[id number]]  (Normal) Collected: 12/06/24 1601    Lab Status: Final result Specimen: Blood from Arm, Right Updated: 12/06/24 1650     Procalcitonin 0.15 ng/ml     Comprehensive metabolic panel [[id number]]  (Abnormal) Collected: 12/06/24 1601    Lab Status: Final result Specimen: Blood from Arm, Right Updated: 12/06/24 1646     Sodium 142 mmol/L      Potassium 3.5 mmol/L      Chloride 97 mmol/L      CO2 32 mmol/L      ANION GAP 13 mmol/L      BUN 13 mg/dL      Creatinine 0.65 mg/dL      Glucose 165 mg/dL      Calcium 9.0 mg/dL      Corrected Calcium 10.0 mg/dL      AST 20 U/L      ALT 6 U/L      Alkaline Phosphatase 73 U/L      Total Protein 6.9 g/dL      Albumin 2.8 g/dL      Total Bilirubin 1.00 mg/dL      eGFR 73 ml/min/1.73sq m     Narrative:      National Kid[employer] Foundation guidelines for Chronic Kidney Disease (CKD):     Stage 1 with normal or high GFR (GFR > 90 mL/min/1.73 square meters)    Stage 2 Mild CKD (GFR = 60-89 mL/min/1.73 square meters)    Stage 3A Moderate CKD (GFR = 45-59 mL/min/1.73 square meters)    Stage 3B Moderate CKD (GFR = 30-44 mL/min/1.73 square meters)    Stage 4 Severe CKD (GFR = 15-29 mL/min/1.73 square meters)    Stage 5 End Stage CKD (GFR <15 mL/min/1.73 square meters)  Note: GFR calculation is accurate only with a steady state creatinine    Protime-INR [[id number]]  (Abnormal) Collected: 12/06/24 1601    Lab Status: Final result Specimen: Blood from Arm, Right Updated: 12/06/24 1637     Protime 18.8 seconds      INR 1.49    Narrative:      INR Therapeutic Range    Indication                                             INR Range      Atrial Fibrillation                                               2.0-3.0  Hypercoagulable State                                     2.0.2.3  Left Ventricular Asist Device                            2.0-3.0  Mechanical Heart Valve                                  -    Aortic(with afib, MI, embolism, HF, LA enlargement,    and/or coagulopathy)                                     2.0-3.0 (2.5-3.5)     Mitral                                                             2.5-3.5  Prosthetic/Bioprosthetic Heart Valve               2.0-3.0  Venous thromboembolism (VTE: VT, PE        2.0-3.0    APTT [[id number]]  (Normal) Collected: 12/06/24 1601    Lab Status: Final result Specimen: Blood from Arm, Right Updated: 12/06/24 1637     PTT 23 seconds     CBC and differential [[id number]] Collected: 12/06/24 1601    Lab Status: No result Specimen: Blood     Blood culture #2 [[id number]] Collected: 12/06/24 1601    Lab Status: No result Specimen: Blood from Arm, Right             XR chest 1 view portable   ED Interpretation by Satya Phelps DO (12/06 1702)   Improved from previous, no new areas of consolidation concerning for pneumonia.  No additional acute cardiopulmonary abnormalities noted per my interpretation          Procedures    ED Medication and Procedure Management   Prior to Admission Medications   Prescriptions Last Dose Informant Patient Reported? Taking?   Cyanocobalamin 1000 MCG CAPS   Yes No   Sig: Take 1,000 mcg by mouth daily   Melatonin 5 MG/15ML LIQD   Yes No   Sig: Take 5 mg by mouth daily at bedtime   albuterol (2.5 mg/3 mL) 0.083 % nebulizer solution   Yes No   Sig: Inhale 2.5 mg Three times daily as needed for wheezing   Patient not taking: Reported on 10/27/2024   apixaban (Eliquis) 2.5 mg  Family Member Yes No   Sig: Take 2.5 mg by mouth 2 (two) times a day   budesonide (PULMICORT) 0.5 mg/2 mL nebulizer solution   No No   Sig: Take 2 mL (0.5 mg total) by nebulization every 12 (twelve) hours Rinse mouth after use.   collagenase (SANTYL) ointment   No No   Sig: Apply topically daily   dorzolamide-timolol (COSOPT)  2-0.5 % ophthalmic solution   Yes No   Si drop 2 (two) times a day   escitalopram (LEXAPRO) 5 mg tablet   No No   Sig: Take 1 tablet (5 mg total) by mouth daily   famotidine (PEPCID) 10 mg tablet   No No   Sig: Take 1 tablet (10 mg total) by mouth daily   gabapentin (NEURONTIN) 100 mg capsule   No No   Sig: Take 1 capsule (100 mg total) by mouth 2 (two) times a day   latanoprost (XALATAN) 0.005 % ophthalmic solution   Yes No   Sig: Administer 1 drop to both eyes   metoprolol tartrate (LOPRESSOR) 25 mg tablet   No No   Sig: Take 1 tablet (25 mg total) by mouth every 12 (twelve) hours   pantoprazole (PROTONIX) 40 mg tablet   Yes No   Sig: Take 40 mg by mouth every morning   pravastatin (PRAVACHOL) 20 mg tablet   Yes No   Sig: Take 20 mg by mouth daily   sodium phosphate 3 mmol/mL   Yes No   Sig: Insert 1 enema into the rectum daily as needed      Facility-Administered Medications: None     Patient's Medications   Discharge Prescriptions    No medications on file     No discharge procedures on file.  ED SEPSIS DOCUMENTATION   Time reflects when diagnosis was documented in both MDM as applicable and the Disposition within this note       Time User Action Codes Description Comment    2024  7:31 PM Satya Phelps Add [N39.0] UTI (urinary tract infection)                  Satya Phelps DO  24 2030

## 2024-12-06 NOTE — ED NOTES
This RN made a report to [facility] ([phone]) and spoke with Lavern. This RN was informed that Manuela Clayton is a current  for protective services ([phone]), This RN left her a voicemail with call back number [phone].     Ginna Hurt RN  12/06/24 3766

## 2024-12-06 NOTE — ED NOTES
This RN received patients via EMS for triage. Upon arrival patient was soiled, This RN noticed an indwelling urinary catheter that was not draining properly, the collection bag was brown, urinary drainage was purulent. This RN removed urinary catheter and replaced with a new 16 fr. Chung. This RN noticed several wound to the body, wounds under bilateral breasts and L side of groin, with puss and erythema. L arm had skin tear with breakdown noted, as well as R shin - dressings changed to this area and wound cleaned. Patient has DTI wound on sacrum, with puss and erythema, wound culture collected by MD. Pressure injury noted to R hip.        Ginna Hurt RN  12/06/24 0711

## 2024-12-06 NOTE — ED ATTENDING ATTESTATION
12/6/2024  IJairo DO, saw and evaluated the patient. I have discussed the patient with the resident/non-physician practitioner and agree with the resident's/non-physician practitioner's findings, Plan of Care, and MDM as documented in the resident's/non-physician practitioner's note, except where noted. All available labs and Radiology studies were reviewed.  I was present for key portions of any procedure(s) performed by the resident/non-physician practitioner and I was immediately available to provide assistance.       At this point I agree with the current assessment done in the Emergency Department.  I have conducted an independent evaluation of this patient a history and physical is as follows:    Patient is a 89-year-old female with a history of atrial fibrillation, dementia, hypertension who presents with altered mental status.  Patient is unable to provide additional history.  She lives at home and family states she has been more confused recently.  She has a chronic indwelling Chung catheter that has not been changed in approximately 7 weeks.    On exam, patient is awake and alert.  She is disoriented and makes repetitive statements.  She has wounds to her right anterior tibial region.  She has a very large sacral wound down to soft tissue and muscle.  She is moving all extremities equally.  Heart is regular rate and rhythm.  Breath sounds normal.  Abdomen is soft, nontender, nondistended.    Upon insertion of new Chung catheter, there was purulent/very cloudy urine output in the tubing.  UA does support suspicion of UTI.  Patient tolerated ceftriaxone in the past and previous urine culture is sensitive to cephalosporins.  Will treat with ceftriaxone as well as 30 cc/kg fluid bolus due to elevated lactate.  Patient is hemodynamically stable and appropriate for admission to med/surg.     Portions of the above record have been created with voice recognition software.  Occasional wrong word or  "\"sound alike\" substitutions may have occurred due to the inherent limitations of voice recognition software.  Read the chart carefully and recognize, using context, where substitutions may have occurred.      ED Course  ED Course as of 12/06/24 2034   Fri Dec 06, 2024   1744 Lactate elevated. Possible infectious etiology. Will order total 30 cc/kg fluid bolus and ceftriaxone. She has tolerated ceftriaxone in past and previous urine culture shows susceptibility   1828 Patient has made 20 cc of urine.  Will send UA and culture         Critical Care Time  CriticalCare Time    Date/Time: 12/6/2024 8:44 PM    Performed by: Jairo Vizcaino DO  Authorized by: Jairo Vizcaino DO    Critical care provider statement:     Critical care time (minutes):  30    Critical care time was exclusive of:  Separately billable procedures and treating other patients and teaching time    Critical care was necessary to treat or prevent imminent or life-threatening deterioration of the following conditions:  Shock and sepsis    Critical care was time spent personally by me on the following activities:  Blood draw for specimens, obtaining history from patient or surrogate, development of treatment plan with patient or surrogate, discussions with consultants, evaluation of patient's response to treatment, examination of patient, interpretation of cardiac output measurements, ordering and performing treatments and interventions, ordering and review of laboratory studies, ordering and review of radiographic studies, re-evaluation of patient's condition and review of old charts  Comments:      Patient received 30 cc/kg fluid bolus for septic shock (lactate > 4.0)        "

## 2024-12-07 LAB
ANION GAP SERPL CALCULATED.3IONS-SCNC: 9 MMOL/L (ref 4–13)
BASOPHILS # BLD AUTO: 0.08 THOUSANDS/ÂΜL (ref 0–0.1)
BASOPHILS NFR BLD AUTO: 1 % (ref 0–1)
BUN SERPL-MCNC: 11 MG/DL (ref 5–25)
CALCIUM SERPL-MCNC: 8.1 MG/DL (ref 8.4–10.2)
CHLORIDE SERPL-SCNC: 104 MMOL/L (ref 96–108)
CO2 SERPL-SCNC: 28 MMOL/L (ref 21–32)
CREAT SERPL-MCNC: 0.43 MG/DL (ref 0.6–1.3)
EOSINOPHIL # BLD AUTO: 0.1 THOUSAND/ÂΜL (ref 0–0.61)
EOSINOPHIL NFR BLD AUTO: 1 % (ref 0–6)
ERYTHROCYTE [DISTWIDTH] IN BLOOD BY AUTOMATED COUNT: 18.2 % (ref 11.6–15.1)
GFR SERPL CREATININE-BSD FRML MDRD: 84 ML/MIN/1.73SQ M
GLUCOSE SERPL-MCNC: 85 MG/DL (ref 65–140)
HCT VFR BLD AUTO: 36.1 % (ref 34.8–46.1)
HGB BLD-MCNC: 10.8 G/DL (ref 11.5–15.4)
IMM GRANULOCYTES # BLD AUTO: 0.08 THOUSAND/UL (ref 0–0.2)
IMM GRANULOCYTES NFR BLD AUTO: 1 % (ref 0–2)
LYMPHOCYTES # BLD AUTO: 1.83 THOUSANDS/ÂΜL (ref 0.6–4.47)
LYMPHOCYTES NFR BLD AUTO: 21 % (ref 14–44)
MAGNESIUM SERPL-MCNC: 2 MG/DL (ref 1.9–2.7)
MCH RBC QN AUTO: 30.6 PG (ref 26.8–34.3)
MCHC RBC AUTO-ENTMCNC: 29.9 G/DL (ref 31.4–37.4)
MCV RBC AUTO: 102 FL (ref 82–98)
MONOCYTES # BLD AUTO: 0.43 THOUSAND/ÂΜL (ref 0.17–1.22)
MONOCYTES NFR BLD AUTO: 5 % (ref 4–12)
NEUTROPHILS # BLD AUTO: 6.11 THOUSANDS/ÂΜL (ref 1.85–7.62)
NEUTS SEG NFR BLD AUTO: 71 % (ref 43–75)
NRBC BLD AUTO-RTO: 0 /100 WBCS
PLATELET # BLD AUTO: 308 THOUSANDS/UL (ref 149–390)
PMV BLD AUTO: 10.4 FL (ref 8.9–12.7)
POTASSIUM SERPL-SCNC: 3.2 MMOL/L (ref 3.5–5.3)
RBC # BLD AUTO: 3.53 MILLION/UL (ref 3.81–5.12)
SODIUM SERPL-SCNC: 141 MMOL/L (ref 135–147)
WBC # BLD AUTO: 8.63 THOUSAND/UL (ref 4.31–10.16)

## 2024-12-07 PROCEDURE — 83735 ASSAY OF MAGNESIUM: CPT | Performed by: NURSE PRACTITIONER

## 2024-12-07 PROCEDURE — 99232 SBSQ HOSP IP/OBS MODERATE 35: CPT | Performed by: INTERNAL MEDICINE

## 2024-12-07 PROCEDURE — 80048 BASIC METABOLIC PNL TOTAL CA: CPT | Performed by: NURSE PRACTITIONER

## 2024-12-07 PROCEDURE — 85025 COMPLETE CBC W/AUTO DIFF WBC: CPT | Performed by: NURSE PRACTITIONER

## 2024-12-07 PROCEDURE — 87040 BLOOD CULTURE FOR BACTERIA: CPT

## 2024-12-07 RX ORDER — POTASSIUM CHLORIDE 14.9 MG/ML
20 INJECTION INTRAVENOUS ONCE
Status: COMPLETED | OUTPATIENT
Start: 2024-12-07 | End: 2024-12-07

## 2024-12-07 RX ORDER — POTASSIUM CHLORIDE 1500 MG/1
20 TABLET, EXTENDED RELEASE ORAL ONCE
Status: COMPLETED | OUTPATIENT
Start: 2024-12-07 | End: 2024-12-07

## 2024-12-07 RX ADMIN — APIXABAN 5 MG: 5 TABLET, FILM COATED ORAL at 17:44

## 2024-12-07 RX ADMIN — POTASSIUM CHLORIDE 20 MEQ: 14.9 INJECTION, SOLUTION INTRAVENOUS at 12:04

## 2024-12-07 RX ADMIN — ACETAMINOPHEN 650 MG: 325 TABLET, FILM COATED ORAL at 10:08

## 2024-12-07 RX ADMIN — PRAVASTATIN SODIUM 20 MG: 20 TABLET ORAL at 17:44

## 2024-12-07 RX ADMIN — ESCITALOPRAM OXALATE 10 MG: 10 TABLET ORAL at 10:08

## 2024-12-07 RX ADMIN — APIXABAN 5 MG: 5 TABLET, FILM COATED ORAL at 10:08

## 2024-12-07 RX ADMIN — LATANOPROST 1 DROP: 50 SOLUTION OPHTHALMIC at 21:08

## 2024-12-07 RX ADMIN — METOPROLOL TARTRATE 25 MG: 25 TABLET, FILM COATED ORAL at 10:08

## 2024-12-07 RX ADMIN — Medication 3 MG: at 21:06

## 2024-12-07 RX ADMIN — POTASSIUM CHLORIDE 20 MEQ: 1500 TABLET, EXTENDED RELEASE ORAL at 12:04

## 2024-12-07 RX ADMIN — METOPROLOL TARTRATE 25 MG: 25 TABLET, FILM COATED ORAL at 21:06

## 2024-12-07 RX ADMIN — ACETAMINOPHEN 650 MG: 325 TABLET, FILM COATED ORAL at 21:06

## 2024-12-07 NOTE — PLAN OF CARE
Problem: Potential for Falls  Goal: Patient will remain free of falls  Description: INTERVENTIONS:  - Educate patient/family on patient safety including physical limitations  - Instruct patient to call for assistance with activity   - Consult OT/PT to assist with strengthening/mobility   - Keep Call bell within reach  - Keep bed low and locked with side rails adjusted as appropriate  - Keep care items and personal belongings within reach  - Initiate and maintain comfort rounds  - Make Fall Risk Sign visible to staff  - Offer Toileting every  Hours, in advance of need  - Initiate/Maintain alarm  - Obtain necessary fall risk management equipment:   - Apply yellow socks and bracelet for high fall risk patients  - Consider moving patient to room near nurses station  12/7/2024 0333 by Annelise Martin, RN  Outcome: Progressing  12/7/2024 0332 by Annelise Martin, RN  Outcome: Progressing

## 2024-12-07 NOTE — ASSESSMENT & PLAN NOTE
Maintained on lopressor 25 mg bid, eliquis 2.5 mg bid   Increase eliquis to 5 mg bid and likely continue this dosing on dc

## 2024-12-07 NOTE — ASSESSMENT & PLAN NOTE
Evaluated by speech during last hospitalization.    Dysphagia 1 with nectar thick.  Aspiration precautions  90 degrees for all PO intake and 30 minutes post PO meals  Small bites, slow rate of intake. Alternate sips and solids.

## 2024-12-07 NOTE — PLAN OF CARE
Problem: Potential for Falls  Goal: Patient will remain free of falls  Description: INTERVENTIONS:  - Educate patient/family on patient safety including physical limitations  - Instruct patient to call for assistance with activity   - Consult OT/PT to assist with strengthening/mobility   - Keep Call bell within reach  - Keep bed low and locked with side rails adjusted as appropriate  - Keep care items and personal belongings within reach  - Initiate and maintain comfort rounds  - Make Fall Risk Sign visible to staff  - Offer Toileting every  Hours, in advance of need  - Initiate/Maintain alarm  - Obtain necessary fall risk management equipment  - Apply yellow socks and bracelet for high fall risk patients  - Consider moving patient to room near nurses station  12/7/2024 1304 by Antonette White RN  Outcome: Progressing  12/7/2024 1304 by Antonette White RN  Outcome: Progressing     Problem: Prexisting or High Potential for Compromised Skin Integrity  Goal: Skin integrity is maintained or improved  Description: INTERVENTIONS:  - Identify patients at risk for skin breakdown  - Assess and monitor skin integrity  - Assess and monitor nutrition and hydration status  - Monitor labs   - Assess for incontinence   - Turn and reposition patient  - Assist with mobility/ambulation  - Relieve pressure over bony prominences  - Avoid friction and shearing  - Provide appropriate hygiene as needed including keeping skin clean and dry  - Evaluate need for skin moisturizer/barrier cream  - Collaborate with interdisciplinary team   - Patient/family teaching  - Consider wound care consult   12/7/2024 1304 by Antonette White RN  Outcome: Progressing  12/7/2024 1304 by Antonette White RN  Outcome: Progressing

## 2024-12-07 NOTE — H&P
H&P - Hospitalist   Name: Grace Clayton 89 y.o. female I MRN: [id number]  Unit/Bed#: W -01 I Date of Admission: 12/6/2024   Date of Service: 12/6/2024 I Hospital Day: 0     Assessment & Plan  UTI (urinary tract infection)  Presented due to worsening mental status. No SIRS, though CBC was not sent to lab and still pending  UA obtained from [facility]: innumerable WBC, bacteria, nitrite positive.  Antibiotics: ceftriaxone   Urine cultures pending  Paroxysmal atrial fibrillation (HCC)  Maintained on lopressor 25 mg bid, eliquis 2.5 mg bid   Increase eliquis to 5 mg bid and likely continue this dosing on dc  Essential hypertension  Maintained on metoprolol tartarte 25mg bid   Suspected dementia with agitation (HCC)  Currently oriented to self and place.  Follows commands.   Melatonin hs   Reorient   Bowel regimen as needed  Pressure ulcers of skin of multiple topographic sites  Multiple areas of skin breakdown.  Stage IV sacral wound present on admission.  Currently does not appear overtly infected.   Wound culture send in ED and pending  Wound care consult  Turn reposition q 1-2 hours   Dysphagia  Evaluated by speech during last hospitalization.    Dysphagia 1 with nectar thick.  Aspiration precautions  90 degrees for all PO intake and 30 minutes post PO meals  Small bites, slow rate of intake. Alternate sips and solids.    Goals of care, counseling/discussion  Please note that patient is currently FULL CODE on admission. Unable to review code status with granddaughter at provided phone number.  Moderate protein-calorie malnutrition (HCC)  BMI 26  Ensure pudding        VTE Pharmacologic Prophylaxis: VTE Score: 5 High Risk (Score >/= 5) - Pharmacological DVT Prophylaxis Ordered: apixaban (Eliquis). Sequential Compression Devices Ordered.  Code Status: Level 1 - Full Code BY DEFAULT   Discussion with family:  unable to reach contact.     Anticipated Length of Stay: Patient will be admitted on an inpatient basis with an  anticipated length of stay of greater than 2 midnights secondary to IV abx.    History of Present Illness   Chief Complaint: altered mental status     Grace Clayton is a 89 y.o. female with a PMH of dementia, afib on eliquis, htn, hld,  who presents with altered mental status.  Patient has chronic carcamo catheter and per ED, has not been changed for 7 weeks.  UA consistent with infection.  Patient has been started on ceftriaxone.  Unfortunately and CBC was collected in the ED and never received by the lab.  A repeat CBC is still to be collected.  Currently, patient is oriented to herself and place.   She continues to request water.      Review of Systems   Psychiatric/Behavioral:  Positive for confusion.    All other systems reviewed and are negative.      Historical Information   Past Medical History:   Diagnosis Date    Atrial fibrillation (HCC)     Dementia (HCC)     Hypertension      History reviewed. No pertinent surgical history.  Social History     Tobacco Use    Smoking status: Never    Smokeless tobacco: Never   Substance and Sexual Activity    Alcohol use: Never    Drug use: Never    Sexual activity: Not on file     E-Cigarette/Vaping     E-Cigarette/Vaping Substances     History reviewed. No pertinent family history.  Social History:  Marital Status: Unknown   Occupation:   Patient Pre-hospital Living Situation: Home  Patient Pre-hospital Level of Mobility: non-ambulatory/bed bound  Patient Pre-hospital Diet Restrictions:     Meds/Allergies   I have reviewed home medications with a medical source (PCP, Pharmacy, other).   Care Everywhere list reviewed.  Prior to Admission medications    Medication Sig Start Date End Date Taking? Authorizing Provider   acetaminophen (TYLENOL) 650 mg CR tablet Take 650 mg by mouth every 8 (eight) hours as needed for mild pain   Yes Historical Provider, MD   apixaban (Eliquis) 2.5 mg Take 2.5 mg by mouth 2 (two) times a day   Yes Historical Provider, MD   bisacodyl (Dulcolax)  10 mg suppository Insert 10 mg into the rectum daily as needed for constipation   Yes Historical Provider, MD   dorzolamide-timolol (COSOPT) 2-0.5 % ophthalmic solution 1 drop 2 (two) times a day 2/7/24  Yes Historical Provider, MD   escitalopram (LEXAPRO) 10 mg tablet Take 10 mg by mouth daily   Yes Historical Provider, MD   gabapentin (NEURONTIN) 100 mg capsule Take 100 mg by mouth 2 (two) times a day as needed   Yes Historical Provider, MD   latanoprost (XALATAN) 0.005 % ophthalmic solution Administer 1 drop to both eyes 2/7/24  Yes Historical Provider, MD   mineral oil enema Insert 1 enema into the rectum daily as needed for constipation   Yes Historical Provider, MD   albuterol (2.5 mg/3 mL) 0.083 % nebulizer solution Inhale 2.5 mg Three times daily as needed for wheezing  Patient not taking: Reported on 10/27/2024 2/7/24 2/14/25  Historical Provider, MD   budesonide (PULMICORT) 0.5 mg/2 mL nebulizer solution Take 2 mL (0.5 mg total) by nebulization every 12 (twelve) hours Rinse mouth after use. 2/21/24 5/21/24  Candelaria Lucas DO   collagenase (SANTYL) ointment Apply topically daily 11/1/24   Parth Cantrell MD   Cyanocobalamin 1000 MCG CAPS Take 1,000 mcg by mouth daily 1/2/24 1/1/25  Historical Provider, MD   escitalopram (LEXAPRO) 5 mg tablet Take 1 tablet (5 mg total) by mouth daily 1/17/24 4/5/24  Lilian Vargas MD   famotidine (PEPCID) 10 mg tablet Take 1 tablet (10 mg total) by mouth daily 2/21/24 4/21/24  Candelaria Lucas DO   gabapentin (NEURONTIN) 100 mg capsule Take 1 capsule (100 mg total) by mouth 2 (two) times a day 1/17/24 2/19/24  Lilian Vargas MD   Melatonin 5 MG/15ML LIQD Take 5 mg by mouth daily at bedtime as needed 1/2/24   Historical Provider, MD   metoprolol tartrate (LOPRESSOR) 25 mg tablet Take 1 tablet (25 mg total) by mouth every 12 (twelve) hours 10/31/24   Maxine Cerna MD   pantoprazole (PROTONIX) 40 mg tablet Take 40 mg by mouth if needed 1/2/24    Historical Provider, MD   pravastatin (PRAVACHOL) 20 mg tablet Take 20 mg by mouth daily    Historical Provider, MD   sodium phosphate 3 mmol/mL Insert 1 enema into the rectum daily as needed    Historical Provider, MD     Allergies   Allergen Reactions    Penicillins Hives       Objective :  Temp:  [97.5 °F (36.4 °C)-97.7 °F (36.5 °C)] 97.7 °F (36.5 °C)  HR:  [75-91] 87  BP: ()/(53-78) 126/78  Resp:  [16] 16  SpO2:  [90 %-99 %] 99 %  O2 Device: Nasal cannula  Nasal Cannula O2 Flow Rate (L/min):  [2 L/min] 2 L/min    Physical Exam  Constitutional:       General: She is not in acute distress.     Appearance: She is not toxic-appearing.   HENT:      Head: Normocephalic and atraumatic.      Right Ear: External ear normal.      Left Ear: External ear normal.      Nose: Nose normal.      Mouth/Throat:      Mouth: Mucous membranes are dry.      Pharynx: Oropharynx is clear.   Eyes:      Extraocular Movements: Extraocular movements intact.      Conjunctiva/sclera: Conjunctivae normal.   Cardiovascular:      Rate and Rhythm: Normal rate.      Pulses: Normal pulses.      Heart sounds: Normal heart sounds.   Pulmonary:      Effort: Pulmonary effort is normal.      Breath sounds: Normal breath sounds.   Abdominal:      General: Bowel sounds are normal. There is no distension.      Palpations: Abdomen is soft.      Tenderness: There is no abdominal tenderness. There is no right CVA tenderness, left CVA tenderness, guarding or rebound.   Genitourinary:     Comments: Chung catheter in place  Musculoskeletal:         General: Tenderness (bilateral lower extremities with movement) present. Normal range of motion.      Cervical back: Normal range of motion.      Right lower leg: No edema.      Left lower leg: No edema.      Comments: LUE contracted   Skin:     General: Skin is warm.      Capillary Refill: Capillary refill takes less than 2 seconds.   Neurological:      General: No focal deficit present.      Mental Status: She  is alert. She is disoriented.      GCS: GCS eye subscore is 4. GCS verbal subscore is 4. GCS motor subscore is 6.      Comments: Oriented to self and place   Psychiatric:         Mood and Affect: Mood normal.         Behavior: Behavior normal.          Lines/Drains:  Lines/Drains/Airways       Active Status       Name Placement date Placement time Site Days    Urethral Catheter 16 Fr. 12/06/24  1600  --  less than 1                  Urinary Catheter:  Goal for removal: N/A - Chronic Chung               Lab Results: I have reviewed the following results:      Results from last 7 days   Lab Units 12/06/24  1601   SODIUM mmol/L 142   POTASSIUM mmol/L 3.5   CHLORIDE mmol/L 97   CO2 mmol/L 32   BUN mg/dL 13   CREATININE mg/dL 0.65   ANION GAP mmol/L 13   CALCIUM mg/dL 9.0   ALBUMIN g/dL 2.8*   TOTAL BILIRUBIN mg/dL 1.00   ALK PHOS U/L 73   ALT U/L 6*   AST U/L 20   GLUCOSE RANDOM mg/dL 165*     Results from last 7 days   Lab Units 12/06/24  1601   INR  1.49*         Lab Results   Component Value Date    HGBA1C 6.1 (H) 10/02/2021    HGBA1C 6.1 (H) 09/29/2020    HGBA1C 5.9 (H) 08/08/2019     Results from last 7 days   Lab Units 12/06/24  1950 12/06/24  1654 12/06/24  1601   LACTIC ACID mmol/L 1.3 4.7*  --    PROCALCITONIN ng/ml  --   --  0.15       Imaging Results Review: No pertinent imaging studies reviewed.  Other Study Results Review: No additional pertinent studies reviewed.    Administrative Statements   I have spent a total time of   minutes in caring for this patient on the day of the visit/encounter including Diagnostic results, Prognosis, Risks and benefits of tx options, Instructions for management, Patient and family education, Importance of tx compliance, Risk factor reductions, Impressions, Counseling / Coordination of care, Documenting in the medical record, Reviewing / ordering tests, medicine, procedures  , Obtaining or reviewing history  , and Communicating with other healthcare professionals .    **  Please Note: This note has been constructed using a voice recognition system. **

## 2024-12-07 NOTE — ASSESSMENT & PLAN NOTE
Presented due to worsening mental status. No SIRS, though CBC was not sent to lab and still pending  UA obtained from [facility]: innumerable WBC, bacteria, nitrite positive.  Antibiotics: ceftriaxone   Urine cultures pending

## 2024-12-07 NOTE — ASSESSMENT & PLAN NOTE
Multiple areas of skin breakdown.  Stage IV sacral wound present on admission.  Currently does not appear overtly infected.   Wound culture send in ED and pending  Wound care consult  Turn reposition q 1-2 hours

## 2024-12-07 NOTE — ASSESSMENT & PLAN NOTE
Currently oriented to self and place.  Follows commands.   Melatonin hs   Reorient   Bowel regimen as needed

## 2024-12-07 NOTE — PROCEDURES
Venous Access Line Insertion    Date/Time: 12/7/2024 11:03 AM    Performed by: India Miller RN  Authorized by: Eli Dick MD    Patient location:  Bedside  Other Assisting Provider: Yes (comment)    Consent:     Consent obtained:  Verbal    Consent given by:  Patient    Alternatives discussed:  No treatment  Universal protocol:     Procedure explained and questions answered to patient or proxy's satisfaction: yes      Immediately prior to procedure, a time out was called: yes      Relevant documents present and verified: yes      Test results available and properly labeled: yes      Radiology Images displayed and confirmed.  If images not available, report reviewed: yes      Patient identity confirmed:  Verbally with patient and arm band  Pre-procedure details:     Hand hygiene: Hand hygiene performed prior to insertion      Sterile barrier technique: All elements of maximal sterile technique followed      Skin preparation:  ChloraPrep    Skin preparation agent: Skin preparation agent completely dried prior to procedure    Procedure details:     Complex Venous Access Line Type: Midline      Complex Venous Access Line Indications: new site      Orientation:  Left    Location:  Basilic    Catheter size:  18 gauge ([id number])    Total catheter length (cm):  10 cm    Catheter out on skin (cm):  0    Arm circumference:  25 cm    Site selection rationale:  Arms  contracted    Approach: percutaneous technique used      Patient position:  Flat    Ultrasound image availability:  Not saved    Sterile ultrasound techniques: Sterile gel and sterile probe covers were used      Number of attempts:  1    Successful placement: yes      Landmarks identified: yes    Anesthesia (see MAR for exact dosages):     Anesthesia method:  None    Sedation type: none.  Post-procedure details:     Post-procedure:  Dressing applied and securement device placed    Assessment:  Blood return through all ports    Post-procedure complications:  none      Patient tolerance of procedure:  Tolerated well, no immediate complications    Observer: Yes      Observer name:  Antonette TIMMONS

## 2024-12-07 NOTE — PROGRESS NOTES
Pt refused labs at 2208. SLIM notified. RN/PCA went into pt's room again to attempt labs and pt still refusing to let us try labs.

## 2024-12-07 NOTE — ASSESSMENT & PLAN NOTE
Please note that patient is currently FULL CODE on admission. Unable to review code status with granddaughter at provided phone number.

## 2024-12-08 LAB
ALBUMIN SERPL BCG-MCNC: 2.2 G/DL (ref 3.5–5)
ALP SERPL-CCNC: 54 U/L (ref 34–104)
ALT SERPL W P-5'-P-CCNC: 5 U/L (ref 7–52)
ANION GAP SERPL CALCULATED.3IONS-SCNC: 5 MMOL/L (ref 4–13)
AST SERPL W P-5'-P-CCNC: 12 U/L (ref 13–39)
BACTERIA WND AEROBE CULT: ABNORMAL
BILIRUB SERPL-MCNC: 0.56 MG/DL (ref 0.2–1)
BUN SERPL-MCNC: 17 MG/DL (ref 5–25)
CALCIUM ALBUM COR SERPL-MCNC: 9.3 MG/DL (ref 8.3–10.1)
CALCIUM SERPL-MCNC: 7.9 MG/DL (ref 8.4–10.2)
CHLORIDE SERPL-SCNC: 105 MMOL/L (ref 96–108)
CO2 SERPL-SCNC: 34 MMOL/L (ref 21–32)
CREAT SERPL-MCNC: 0.47 MG/DL (ref 0.6–1.3)
GFR SERPL CREATININE-BSD FRML MDRD: 81 ML/MIN/1.73SQ M
GLUCOSE SERPL-MCNC: 99 MG/DL (ref 65–140)
GRAM STN SPEC: ABNORMAL
POTASSIUM SERPL-SCNC: 2.8 MMOL/L (ref 3.5–5.3)
PROT SERPL-MCNC: 5.2 G/DL (ref 6.4–8.4)
SODIUM SERPL-SCNC: 144 MMOL/L (ref 135–147)

## 2024-12-08 PROCEDURE — 80053 COMPREHEN METABOLIC PANEL: CPT | Performed by: INTERNAL MEDICINE

## 2024-12-08 PROCEDURE — 99232 SBSQ HOSP IP/OBS MODERATE 35: CPT | Performed by: INTERNAL MEDICINE

## 2024-12-08 RX ORDER — POTASSIUM CHLORIDE 1500 MG/1
40 TABLET, EXTENDED RELEASE ORAL EVERY 6 HOURS
Status: COMPLETED | OUTPATIENT
Start: 2024-12-08 | End: 2024-12-09

## 2024-12-08 RX ADMIN — ACETAMINOPHEN 650 MG: 325 TABLET, FILM COATED ORAL at 08:50

## 2024-12-08 RX ADMIN — Medication 3 MG: at 21:19

## 2024-12-08 RX ADMIN — DORZOLAMIDE HYDROCHLORIDE AND TIMOLOL MALEATE 1 DROP: 20; 5 SOLUTION OPHTHALMIC at 08:52

## 2024-12-08 RX ADMIN — DORZOLAMIDE HYDROCHLORIDE AND TIMOLOL MALEATE 1 DROP: 20; 5 SOLUTION OPHTHALMIC at 17:07

## 2024-12-08 RX ADMIN — METOPROLOL TARTRATE 25 MG: 25 TABLET, FILM COATED ORAL at 08:50

## 2024-12-08 RX ADMIN — LATANOPROST 1 DROP: 50 SOLUTION OPHTHALMIC at 21:19

## 2024-12-08 RX ADMIN — APIXABAN 5 MG: 5 TABLET, FILM COATED ORAL at 17:07

## 2024-12-08 RX ADMIN — ESCITALOPRAM OXALATE 10 MG: 10 TABLET ORAL at 08:50

## 2024-12-08 RX ADMIN — PRAVASTATIN SODIUM 20 MG: 20 TABLET ORAL at 15:43

## 2024-12-08 RX ADMIN — POTASSIUM CHLORIDE 40 MEQ: 1500 TABLET, EXTENDED RELEASE ORAL at 19:31

## 2024-12-08 RX ADMIN — APIXABAN 5 MG: 5 TABLET, FILM COATED ORAL at 08:50

## 2024-12-08 NOTE — DISCHARGE INSTR - OTHER ORDERS
Skin care Plan:  1-Sacrum- Cleanse with NSS, pat dry. Apply Mesalt sheet over wound bed, fluff into area of undermining and cover with Silicone bordered foam. Nacho with T for treatment. Change daily and PRN.   2-B/L breast and groin- Cleanse skin and wounds with soap and water, pat dry. Apply dusting of Nystatin powder 2 times a day as ordered.  3-B/L arm and right leg wounds- Cleanse wounds with NSS, pat dry. Apply Dermagran over wound beds, cover with 4x4, wrap with Atilio. Change every other day and as needed for soilage/displacement.   4-Moisturize skin daily with skin nourishing cream  5-Ehob cushion in chair when out of bed.  6-Hydraguard to bilateral buttock and heels BID and PRN.   7-Turn/reposition q2h for pressure re-distribution on skin.  8-Elevate heels to offload pressure  9-P-500 low air loss mattress.

## 2024-12-08 NOTE — PROGRESS NOTES
Progress Note - Hospitalist   Name: Grace Clayton 89 y.o. female I MRN: [id number]  Unit/Bed#: W -01 I Date of Admission: 12/6/2024   Date of Service: 12/7/2024 I Hospital Day: 1    Assessment & Plan  UTI (urinary tract infection)  Presented due to worsening mental status. No SIRS, though CBC was not sent to lab and still pending  UA obtained from [facility]: innumerable WBC, bacteria, nitrite positive.  Antibiotics: ceftriaxone   Urine cultures pending  Paroxysmal atrial fibrillation (HCC)  Maintained on lopressor 25 mg bid, eliquis 2.5 mg bid   Increase eliquis to 5 mg bid and likely continue this dosing on dc  Essential hypertension  Maintained on metoprolol tartarte 25mg bid   BP is 122/80  Suspected dementia with agitation (HCC)  Currently oriented to self and place.  Follows commands.   Melatonin hs   Reorient   Bowel regimen as needed  Pressure ulcers of skin of multiple topographic sites  Multiple areas of skin breakdown.  Stage IV sacral wound present on admission.  Currently does not appear overtly infected.   Wound culture send in ED and pending  Wound care consult  Turn reposition q 1-2 hours   Dysphagia  Evaluated by speech during last hospitalization.    Dysphagia 1 with nectar thick.  Aspiration precautions  90 degrees for all PO intake and 30 minutes post PO meals  Small bites, slow rate of intake. Alternate sips and solids.    Goals of care, counseling/discussion  Please note that patient is currently FULL CODE on admission. Unable to review code status with granddaughter at provided phone number.  Moderate protein-calorie malnutrition (HCC)  BMI 26  Ensure pudding      VTE Pharmacologic Prophylaxis: VTE Score: 5 Moderate Risk (Score 3-4) - Pharmacological DVT Prophylaxis Ordered: heparin.    Mobility:   Not assessed by myself.     Patient Centered Rounds: I performed bedside rounds with nursing staff today.   Discussions with Specialists or Other Care Team Provider: Discussed with patient and family  "update as per quick note.     Education and Discussions with Family / Patient:  as above. .     Current Length of Stay: 1 day(s)  Current Patient Status: Inpatient   Certification Statement: determination of DC destination.   Discharge Plan: Anticipate discharge in 24-48 hrs to be determined regarding DC destination.     Code Status: Level 1 - Full Code    Subjective   Patient seen and examined   Feeling \"okay\"    Objective :  Temp:  [96.8 °F (36 °C)-97.7 °F (36.5 °C)] 97.6 °F (36.4 °C)  HR:  [] 86  BP: (117-128)/(78-80) 122/80  Resp:  [16-18] 16  SpO2:  [85 %-100 %] 95 %  O2 Device: None (Room air)    There is no height or weight on file to calculate BMI.     Input and Output Summary (last 24 hours):     Intake/Output Summary (Last 24 hours) at 12/7/2024 2010  Last data filed at 12/7/2024 1700  Gross per 24 hour   Intake 300 ml   Output 200 ml   Net 100 ml       Physical Exam  Constitutional:       General: She is not in acute distress.     Appearance: She is ill-appearing. She is not toxic-appearing.   HENT:      Head: Normocephalic.   Eyes:      Pupils: Pupils are equal, round, and reactive to light.   Cardiovascular:      Rate and Rhythm: Normal rate.   Pulmonary:      Effort: No respiratory distress.      Breath sounds: No stridor. No wheezing, rhonchi or rales.   Chest:      Chest wall: No tenderness.   Abdominal:      General: Abdomen is flat.   Musculoskeletal:      Right lower leg: No edema.      Left lower leg: No edema.   Skin:     Capillary Refill: Capillary refill takes less than 2 seconds.      Coloration: Skin is pale. Skin is not jaundiced.      Findings: No bruising, erythema, lesion or rash.   Neurological:      General: No focal deficit present.      Cranial Nerves: No cranial nerve deficit.      Sensory: No sensory deficit.      Motor: No weakness.      Coordination: Coordination normal.      Gait: Gait normal.      Deep Tendon Reflexes: Reflexes normal.   Psychiatric:         Mood and " Affect: Mood normal.           Lines/Drains:  Lines/Drains/Airways       Active Status       Name Placement date Placement time Site Days    Urethral Catheter 16 Fr. 12/06/24  1600  --  1                  Urinary Catheter:  Goal for removal: N/A - Chronic Chung                 Lab Results: I have reviewed the following results:   Results from last 7 days   Lab Units 12/07/24  0829   WBC Thousand/uL 8.63   HEMOGLOBIN g/dL 10.8*   HEMATOCRIT % 36.1   PLATELETS Thousands/uL 308   SEGS PCT % 71   LYMPHO PCT % 21   MONO PCT % 5   EOS PCT % 1     Results from last 7 days   Lab Units 12/07/24  0829 12/06/24  1601   SODIUM mmol/L 141 142   POTASSIUM mmol/L 3.2* 3.5   CHLORIDE mmol/L 104 97   CO2 mmol/L 28 32   BUN mg/dL 11 13   CREATININE mg/dL 0.43* 0.65   ANION GAP mmol/L 9 13   CALCIUM mg/dL 8.1* 9.0   ALBUMIN g/dL  --  2.8*   TOTAL BILIRUBIN mg/dL  --  1.00   ALK PHOS U/L  --  73   ALT U/L  --  6*   AST U/L  --  20   GLUCOSE RANDOM mg/dL 85 165*     Results from last 7 days   Lab Units 12/06/24  1601   INR  1.49*             Results from last 7 days   Lab Units 12/06/24  1950 12/06/24  1654 12/06/24  1601   LACTIC ACID mmol/L 1.3 4.7*  --    PROCALCITONIN ng/ml  --   --  0.15       Recent Cultures (last 7 days):   Results from last 7 days   Lab Units 12/07/24  1244 12/07/24  0828 12/06/24  1827 12/06/24  1655   BLOOD CULTURE   --  Received in Microbiology Lab. Culture in Progress.  --   --    GRAM STAIN RESULT  Gram positive cocci in clusters*  --   --  No Polys or Bacteria seen   URINE CULTURE   --   --  >100,000 cfu/ml Gram Negative Segundo Enteric Like*  --    WOUND CULTURE   --   --   --  2+ Growth of Staphylococcus aureus*  2+ Growth of Pseudomonas aeruginosa*       Imaging Results Review: No pertinent imaging studies reviewed.  Other Study Results Review: No additional pertinent studies reviewed.    Last 24 Hours Medication List:     Current Facility-Administered Medications:     acetaminophen (TYLENOL) tablet 650  mg, Q6H PRN    apixaban (ELIQUIS) tablet 5 mg, BID    bisacodyl (DULCOLAX) rectal suppository 10 mg, Daily PRN    dorzolamide-timolol (COSOPT) 2-0.5 % ophthalmic solution 1 drop, BID    escitalopram (LEXAPRO) tablet 10 mg, Daily    latanoprost (XALATAN) 0.005 % ophthalmic solution 1 drop, HS    melatonin tablet 3 mg, HS    metoprolol tartrate (LOPRESSOR) tablet 25 mg, Q12H ANY    pravastatin (PRAVACHOL) tablet 20 mg, Daily With Dinner    Administrative Statements   Today, Patient Was Seen By: Eli Dick MD  I have spent a total time of 30 minutes in caring for this patient on the day of the visit/encounter including Diagnostic results, Prognosis, Risks and benefits of tx options, Instructions for management, Patient and family education, Importance of tx compliance, Risk factor reductions, Impressions, Counseling / Coordination of care, Documenting in the medical record, Reviewing / ordering tests, medicine, procedures  , Obtaining or reviewing history  , and Communicating with other healthcare professionals .    **Please Note: This note may have been constructed using a voice recognition system.**

## 2024-12-08 NOTE — PROGRESS NOTES
Progress Note - Hospitalist   Name: Grace Clayton 89 y.o. female I MRN: [id number]  Unit/Bed#: W -01 I Date of Admission: 12/6/2024   Date of Service: 12/8/2024 I Hospital Day: 2    Assessment & Plan  UTI (urinary tract infection)  Presented due to worsening mental status. No SIRS, though CBC was not sent to lab and still pending  UA obtained from [facility]: innumerable WBC, bacteria, nitrite positive.  Antibiotics: ceftriaxone initially held further doses given afebrile and ?colonization  Urine cultures growing Ecoli  Paroxysmal atrial fibrillation (HCC)  Maintained on lopressor 25 mg bid, eliquis 2.5 mg bid   Increase eliquis to 5 mg bid and likely continue this dosing on dc  Essential hypertension  Maintained on metoprolol tartarte 25mg bid   BP is 126/680  Suspected dementia with agitation (HCC)  Currently oriented to self and place.  Follows commands.   Melatonin hs   Reorient   Bowel regimen as needed  Pressure ulcers of skin of multiple topographic sites  Multiple areas of skin breakdown.  Stage IV sacral wound present on admission.  Currently does not appear overtly infected.   Wound culture send in ED and pending  Wound care consult  Turn reposition q 1-2 hours   Dysphagia  Evaluated by speech during last hospitalization.    Dysphagia 1 with nectar thick.  Aspiration precautions  90 degrees for all PO intake and 30 minutes post PO meals  Small bites, slow rate of intake. Alternate sips and solids.    Goals of care, counseling/discussion  Please note that patient is currently FULL CODE on admission. Unable to review code status with granddaughter at provided phone number.  Moderate protein-calorie malnutrition (HCC)  BMI 26  Ensure pudding      VTE Pharmacologic Prophylaxis: VTE Score: 5 Moderate Risk (Score 3-4) - Pharmacological DVT Prophylaxis Ordered: heparin.    Mobility:   Not assessd.     Patient Centered Rounds: I performed bedside rounds with nursing staff today.   Discussions with Specialists or  "Other Care Team Provider: Discussed with nursing.     Education and Discussions with Family / Patient:  will call grand rhoades. .     Current Length of Stay: 2 day(s)  Current Patient Status: Inpatient   Certification Statement: The patient will continue to require additional inpatient hospital stay due to IVABX.   Discharge Plan: Anticipate discharge in 24-48 hrs to to be determined.     Code Status: Level 1 - Full Code    Subjective   Patient seen and examined   Feeling \"okay\"    Objective :  Temp:  [97.8 °F (36.6 °C)-98 °F (36.7 °C)] 98 °F (36.7 °C)  HR:  [83-99] 83  BP: (119-126)/(72-87) 126/87  Resp:  [17-18] 17  SpO2:  [98 %-99 %] 98 %  O2 Device: None (Room air)    There is no height or weight on file to calculate BMI.     Input and Output Summary (last 24 hours):     Intake/Output Summary (Last 24 hours) at 12/8/2024 1732  Last data filed at 12/8/2024 1401  Gross per 24 hour   Intake 460 ml   Output 950 ml   Net -490 ml       Physical Exam  Constitutional:       General: She is not in acute distress.     Appearance: She is ill-appearing (chronically). She is not toxic-appearing.   HENT:      Mouth/Throat:      Mouth: Mucous membranes are moist.   Eyes:      Pupils: Pupils are equal, round, and reactive to light.   Cardiovascular:      Rate and Rhythm: Normal rate.   Pulmonary:      Effort: No respiratory distress.      Breath sounds: No stridor. No wheezing, rhonchi or rales.   Chest:      Chest wall: No tenderness.   Abdominal:      General: There is no distension.      Palpations: There is no mass.      Tenderness: There is no abdominal tenderness. There is no right CVA tenderness, left CVA tenderness, guarding or rebound.      Hernia: No hernia is present.   Musculoskeletal:         General: No swelling, tenderness, deformity or signs of injury.      Right lower leg: No edema.      Left lower leg: No edema.   Skin:     Capillary Refill: Capillary refill takes less than 2 seconds.      Coloration: Skin " is pale. Skin is not jaundiced.      Findings: No bruising, erythema, lesion or rash.   Neurological:      General: No focal deficit present.      Mental Status: She is alert.      Cranial Nerves: No cranial nerve deficit.      Sensory: No sensory deficit.      Motor: No weakness.      Coordination: Coordination normal.      Gait: Gait normal.      Deep Tendon Reflexes: Reflexes normal.   Psychiatric:         Mood and Affect: Mood normal.           Lines/Drains:  Lines/Drains/Airways       Active Status       Name Placement date Placement time Site Days    Urethral Catheter 16 Fr. 12/06/24  1600  --  2                  Urinary Catheter:  Goal for removal: N/A - Chronic Chung                 Lab Results: I have reviewed the following results:   Results from last 7 days   Lab Units 12/07/24  0829   WBC Thousand/uL 8.63   HEMOGLOBIN g/dL 10.8*   HEMATOCRIT % 36.1   PLATELETS Thousands/uL 308   SEGS PCT % 71   LYMPHO PCT % 21   MONO PCT % 5   EOS PCT % 1     Results from last 7 days   Lab Units 12/08/24  0528   SODIUM mmol/L 144   POTASSIUM mmol/L 2.8*   CHLORIDE mmol/L 105   CO2 mmol/L 34*   BUN mg/dL 17   CREATININE mg/dL 0.47*   ANION GAP mmol/L 5   CALCIUM mg/dL 7.9*   ALBUMIN g/dL 2.2*   TOTAL BILIRUBIN mg/dL 0.56   ALK PHOS U/L 54   ALT U/L 5*   AST U/L 12*   GLUCOSE RANDOM mg/dL 99     Results from last 7 days   Lab Units 12/06/24  1601   INR  1.49*             Results from last 7 days   Lab Units 12/06/24  1950 12/06/24  1654 12/06/24  1601   LACTIC ACID mmol/L 1.3 4.7*  --    PROCALCITONIN ng/ml  --   --  0.15       Recent Cultures (last 7 days):   Results from last 7 days   Lab Units 12/07/24  0828 12/06/24  1827 12/06/24  1655 12/06/24  1524   BLOOD CULTURE  No Growth at 24 hrs.  --   --  Staphylococcus epidermidis*   GRAM STAIN RESULT   --   --  No Polys or Bacteria seen Gram positive cocci in clusters*   URINE CULTURE   --  >100,000 cfu/ml Escherichia coli*  --   --    WOUND CULTURE   --   --  2+ Growth of  Staphylococcus aureus*  2+ Growth of Pseudomonas aeruginosa*  1+ Growth of  --        Imaging Results Review: No pertinent imaging studies reviewed.  Other Study Results Review: No additional pertinent studies reviewed.    Last 24 Hours Medication List:     Current Facility-Administered Medications:     acetaminophen (TYLENOL) tablet 650 mg, Q6H PRN    apixaban (ELIQUIS) tablet 5 mg, BID    bisacodyl (DULCOLAX) rectal suppository 10 mg, Daily PRN    dorzolamide-timolol (COSOPT) 2-0.5 % ophthalmic solution 1 drop, BID    escitalopram (LEXAPRO) tablet 10 mg, Daily    latanoprost (XALATAN) 0.005 % ophthalmic solution 1 drop, HS    melatonin tablet 3 mg, HS    metoprolol tartrate (LOPRESSOR) tablet 25 mg, Q12H ANY    pravastatin (PRAVACHOL) tablet 20 mg, Daily With Dinner    Administrative Statements   Today, Patient Was Seen By: Eli Dick MD  I have spent a total time of 30 minutes in caring for this patient on the day of the visit/encounter including Diagnostic results, Prognosis, Risks and benefits of tx options, Instructions for management, Patient and family education, Importance of tx compliance, Risk factor reductions, Impressions, Counseling / Coordination of care, Documenting in the medical record, Reviewing / ordering tests, medicine, procedures  , Obtaining or reviewing history  , and Communicating with other healthcare professionals .    **Please Note: This note may have been constructed using a voice recognition system.**

## 2024-12-08 NOTE — ASSESSMENT & PLAN NOTE
Presented due to worsening mental status. No SIRS, though CBC was not sent to lab and still pending  UA obtained from [facility]: innumerable WBC, bacteria, nitrite positive.  Antibiotics: ceftriaxone initially held further doses given afebrile and ?colonization  Urine cultures growing Ecoli

## 2024-12-08 NOTE — WOUND OSTOMY CARE
Consult Note - Wound   Grace Clayton 89 y.o. female MRN: [id number]  Unit/Bed#: W -01 Encounter: [id number]        Assessment :   Patient admitted to [facility] due to UTI. History of a-fib., dementia, HTN. Wound care nurse consulted for pre-hospital wounds. Patient is agreeable to assessment, alert and oriented to self, assist of 2 to turn for assessment, wedges in use for repositioning, heels elevated off of mattress, is dependent for care. Primary nurse at bedside during assessment. Recommend patient be placed on a P-500 low air loss mattress.     1. Pressure injury sacrum, unstageable-POA- Wound is oval in shape, full-thickness, true depth unknown, approx. 20% dusky tissue, 20% yellow adhered slough, and 60% pink/beefy red tissue, with moderate amount of tan and serosanguineous drainage noted. Leonor-wound is dry, intact, blanchable.     2. Bilateral breast MASD/ITD- Wounds are oval with irregular border, partial thickness, 100% pink tissue, with small amount of tan and serous drainage noted. Leonor-wounds are fragile, with an irregular red rash, suspect fungal component.     3. Bilateral groin- Skin is dry, intact, with red irregular rash.     4. Left posterior forearm- Wound is irregular in shape, partial thickness, 100% pink tissue, with small amount of serosanguineous drainage noted. Leonor-wound is dry, intact, no redness.     5. Right anterior tibia- Wound is irregular in shape, partial thickness, 100% pink tissue, with scant amount of serosanguineous drainage noted. Leonor-wound is dry, intact, no redness.     6. Bilateral elbows, hips, buttock, and heels- Skin is dry, intact, blanchable.     7. Bilateral medial foot noted with areas of skin that are red and slow to artemio- please continue to monitor for signs of skin breakdown or further discoloration.     No induration, fluctuance, odor, warmth, or purulence noted to the above noted wounds. New dressings applied. Patient tolerated well. Primary nurse aware of plan  of care. See flow sheets for more detailed assessment findings. Will follow along.    Skin care Plan:  1-Sacrum- Cleanse with NSS, pat dry. Apply Melgisorb Ag/Silver alginate over wound bed and cover with Silicone bordered foam. Nacho with T for treatment. Change daily and PRN.   2-B/L breast and groin- Cleanse skin and wounds with soap and water, pat dry. Apply dusting of Nystatin powder 2 times a day as ordered.  3-Left arm and right leg wounds- Cleanse wounds with NSS, pat dry. Apply Dermagran over wound beds, cover with 4x4, wrap with Atilio. Change every other day and as needed for soilage/displacement.   4-Moisturize skin daily with skin nourishing cream  5-Ehob cushion in chair when out of bed.  6-Hydraguard to bilateral buttock and heels BID and PRN.   7-Turn/reposition q2h for pressure re-distribution on skin.  8-Elevate heels to offload pressure  9-P-500 low air loss mattress.      Wound 10/27/24 Pressure Injury Sacrum Medial (Active)   Wound Image   12/08/24 1010   Wound Description Yellow;Pink;Light purple;Dusky 12/08/24 1010   Pressure Injury Stage U 12/08/24 1010   Leonor-wound Assessment Dry;Intact;[place] 12/08/24 1010   Wound Length (cm) 5.5 cm 12/08/24 1010   Wound Width (cm) 7 cm 12/08/24 1010   Wound Depth (cm) 0.5 cm 12/08/24 1010   Wound Surface Area (cm^2) 38.5 cm^2 12/08/24 1010   Wound Volume (cm^3) 19.25 cm^3 12/08/24 1010   Calculated Wound Volume (cm^3) 19.25 cm^3 12/08/24 1010   Change in Wound Size % 77.08 12/08/24 1010   Drainage Amount Moderate 12/08/24 1010   Drainage Description Neal;Serosanguineous 12/08/24 1010   Non-staged Wound Description Full thickness 12/08/24 1010   Treatments Cleansed;Irrigation with NSS;Site care 12/08/24 1010   Dressing Calcium Alginate with Silver;Foam, Silicon (eg. Allevyn, etc) 12/08/24 1010   Wound packed? No 12/08/24 1010   Dressing Changed New 12/08/24 1010   Patient Tolerance Tolerated well 12/08/24 1010   Dressing Status Clean;Dry;Intact 12/08/24 1010        Wound 10/27/24 Abrasion(s) Pretibial Distal;Right (Active)   Wound Image   12/08/24 1004   Wound Description [place] 12/08/24 1004   Leonor-wound Assessment Dry;Intact 12/08/24 1004   Wound Length (cm) 4.5 cm 12/08/24 1004   Wound Width (cm) 2 cm 12/08/24 1004   Wound Depth (cm) 0.1 cm 12/08/24 1004   Wound Surface Area (cm^2) 9 cm^2 12/08/24 1004   Wound Volume (cm^3) 0.9 cm^3 12/08/24 1004   Calculated Wound Volume (cm^3) 0.9 cm^3 12/08/24 1004   Change in Wound Size % -2900 12/08/24 1004   Drainage Amount Scant 12/08/24 1004   Drainage Description Serosanguineous 12/08/24 1004   Non-staged Wound Description Partial thickness 12/08/24 1004   Treatments Cleansed;Irrigation with NSS;Site care 12/08/24 1004   Dressing Xeroform;Dry dressing;Gauze 12/08/24 1004   Wound packed? No 12/08/24 1004   Dressing Changed Changed 12/08/24 1004   Patient Tolerance Tolerated well 12/08/24 1004   Dressing Status Clean;Dry;Intact 12/08/24 1004       Wound 10/29/24 Arm Left;Lower (Active)   Wound Image   12/08/24 1002   Wound Description [place] 12/08/24 1002   Leonor-wound Assessment Dry;Intact 12/08/24 1002   Wound Length (cm) 6 cm 12/08/24 1002   Wound Width (cm) 2 cm 12/08/24 1002   Wound Depth (cm) 0.1 cm 12/08/24 1002   Wound Surface Area (cm^2) 12 cm^2 12/08/24 1002   Wound Volume (cm^3) 1.2 cm^3 12/08/24 1002   Calculated Wound Volume (cm^3) 1.2 cm^3 12/08/24 1002   Change in Wound Size % 70 12/08/24 1002   Drainage Amount Small 12/08/24 1002   Drainage Description Serosanguineous 12/08/24 1002   Non-staged Wound Description Partial thickness 12/08/24 1002   Treatments Cleansed;Irrigation with NSS;Site care 12/08/24 1002   Dressing Xeroform;Gauze;Dry dressing 12/08/24 1002   Wound packed? No 12/08/24 1002   Dressing Changed Changed 12/08/24 1002   Patient Tolerance Tolerated well 12/08/24 1002   Dressing Status Clean;Dry;Intact 12/08/24 1002       Wound 12/06/24 Groin Anterior;Left;Proximal (Active)   Wound Image   12/08/24 1002    Wound Description Dry;Intact 12/08/24 1006   Leonor-wound Assessment Dry;Intact;Rash 12/08/24 1006   Wound Length (cm) 0 cm 12/08/24 1006   Wound Width (cm) 0 cm 12/08/24 1006   Wound Depth (cm) 0 cm 12/08/24 1006   Wound Surface Area (cm^2) 0 cm^2 12/08/24 1006   Wound Volume (cm^3) 0 cm^3 12/08/24 1006   Calculated Wound Volume (cm^3) 0 cm^3 12/08/24 1006       Wound 12/06/24 Breast Left (Active)   Wound Image    12/08/24 1006   Wound Description [place] 12/08/24 1006   Leonor-wound Assessment Fragile;Denuded;Rash 12/08/24 1006   Wound Length (cm) 6 cm 12/08/24 1006   Wound Width (cm) 10 cm 12/08/24 1006   Wound Depth (cm) 0.1 cm 12/08/24 1006   Wound Surface Area (cm^2) 60 cm^2 12/08/24 1006   Wound Volume (cm^3) 6 cm^3 12/08/24 1006   Calculated Wound Volume (cm^3) 6 cm^3 12/08/24 1006   Drainage Amount Small 12/08/24 1006   Drainage Description Serous;Tan 12/08/24 1006   Non-staged Wound Description Partial thickness 12/08/24 1006   Treatments Cleansed;Site care 12/08/24 1006       Wound Breast Right;Lower (Active)   Wound Image   12/08/24 1008   Wound Description [place] 12/08/24 1008   Leonor-wound Assessment Fragile;Denuded;Rash 12/08/24 1008   Wound Length (cm) 3.5 cm 12/08/24 1008   Wound Width (cm) 10 cm 12/08/24 1008   Wound Depth (cm) 0.1 cm 12/08/24 1008   Wound Surface Area (cm^2) 35 cm^2 12/08/24 1008   Wound Volume (cm^3) 3.5 cm^3 12/08/24 1008   Calculated Wound Volume (cm^3) 3.5 cm^3 12/08/24 1008   Drainage Amount Small 12/08/24 1008   Drainage Description Serous;Tan 12/08/24 1008   Non-staged Wound Description Partial thickness 12/08/24 1008   Treatments Cleansed;Irrigation with NSS;Site care 12/08/24 1008     Contact through [employer] Secure Chat with any questions  Wound Care will continue to follow while inpatient    Janet MUHAMMADN RN CWON  Wound and Ostomy care

## 2024-12-09 LAB
ALBUMIN SERPL BCG-MCNC: 2.3 G/DL (ref 3.5–5)
ALP SERPL-CCNC: 57 U/L (ref 34–104)
ALT SERPL W P-5'-P-CCNC: 5 U/L (ref 7–52)
ANION GAP SERPL CALCULATED.3IONS-SCNC: 2 MMOL/L (ref 4–13)
AST SERPL W P-5'-P-CCNC: 11 U/L (ref 13–39)
BACTERIA BLD CULT: ABNORMAL
BACTERIA UR CULT: ABNORMAL
BASOPHILS # BLD AUTO: 0.05 THOUSANDS/ÂΜL (ref 0–0.1)
BASOPHILS NFR BLD AUTO: 1 % (ref 0–1)
BILIRUB SERPL-MCNC: 0.55 MG/DL (ref 0.2–1)
BUN SERPL-MCNC: 22 MG/DL (ref 5–25)
CALCIUM ALBUM COR SERPL-MCNC: 9.4 MG/DL (ref 8.3–10.1)
CALCIUM SERPL-MCNC: 8 MG/DL (ref 8.4–10.2)
CHLORIDE SERPL-SCNC: 106 MMOL/L (ref 96–108)
CO2 SERPL-SCNC: 35 MMOL/L (ref 21–32)
CREAT SERPL-MCNC: 0.5 MG/DL (ref 0.6–1.3)
DME PARACHUTE DELIVERY DATE REQUESTED: NORMAL
DME PARACHUTE ITEM DESCRIPTION: NORMAL
DME PARACHUTE ORDER STATUS: NORMAL
DME PARACHUTE SUPPLIER NAME: NORMAL
DME PARACHUTE SUPPLIER PHONE: NORMAL
EOSINOPHIL # BLD AUTO: 0.19 THOUSAND/ÂΜL (ref 0–0.61)
EOSINOPHIL NFR BLD AUTO: 2 % (ref 0–6)
ERYTHROCYTE [DISTWIDTH] IN BLOOD BY AUTOMATED COUNT: 18.2 % (ref 11.6–15.1)
GFR SERPL CREATININE-BSD FRML MDRD: 80 ML/MIN/1.73SQ M
GLUCOSE SERPL-MCNC: 85 MG/DL (ref 65–140)
GRAM STN SPEC: ABNORMAL
HCT VFR BLD AUTO: 28.1 % (ref 34.8–46.1)
HGB BLD-MCNC: 8.9 G/DL (ref 11.5–15.4)
IMM GRANULOCYTES # BLD AUTO: 0.03 THOUSAND/UL (ref 0–0.2)
IMM GRANULOCYTES NFR BLD AUTO: 0 % (ref 0–2)
LYMPHOCYTES # BLD AUTO: 1.69 THOUSANDS/ÂΜL (ref 0.6–4.47)
LYMPHOCYTES NFR BLD AUTO: 21 % (ref 14–44)
MCH RBC QN AUTO: 31.7 PG (ref 26.8–34.3)
MCHC RBC AUTO-ENTMCNC: 31.7 G/DL (ref 31.4–37.4)
MCV RBC AUTO: 100 FL (ref 82–98)
MECA+MECC ISLT/SPM QL: DETECTED
MONOCYTES # BLD AUTO: 0.61 THOUSAND/ÂΜL (ref 0.17–1.22)
MONOCYTES NFR BLD AUTO: 8 % (ref 4–12)
NEUTROPHILS # BLD AUTO: 5.58 THOUSANDS/ÂΜL (ref 1.85–7.62)
NEUTS SEG NFR BLD AUTO: 68 % (ref 43–75)
NRBC BLD AUTO-RTO: 0 /100 WBCS
PLATELET # BLD AUTO: 286 THOUSANDS/UL (ref 149–390)
PMV BLD AUTO: 9.9 FL (ref 8.9–12.7)
POTASSIUM SERPL-SCNC: 3.8 MMOL/L (ref 3.5–5.3)
PROT SERPL-MCNC: 5.2 G/DL (ref 6.4–8.4)
RBC # BLD AUTO: 2.81 MILLION/UL (ref 3.81–5.12)
S EPIDERMIDIS DNA BLD POS QL NAA+NON-PRB: DETECTED
SODIUM SERPL-SCNC: 143 MMOL/L (ref 135–147)
WBC # BLD AUTO: 8.15 THOUSAND/UL (ref 4.31–10.16)

## 2024-12-09 PROCEDURE — 99232 SBSQ HOSP IP/OBS MODERATE 35: CPT

## 2024-12-09 PROCEDURE — 85025 COMPLETE CBC W/AUTO DIFF WBC: CPT | Performed by: NURSE PRACTITIONER

## 2024-12-09 PROCEDURE — 80053 COMPREHEN METABOLIC PANEL: CPT | Performed by: NURSE PRACTITIONER

## 2024-12-09 RX ORDER — CEFAZOLIN SODIUM 1 G/50ML
1000 SOLUTION INTRAVENOUS EVERY 8 HOURS
Status: DISCONTINUED | OUTPATIENT
Start: 2024-12-09 | End: 2024-12-11

## 2024-12-09 RX ADMIN — POTASSIUM CHLORIDE 40 MEQ: 1500 TABLET, EXTENDED RELEASE ORAL at 01:28

## 2024-12-09 RX ADMIN — CEFTRIAXONE SODIUM 1000 MG: 10 INJECTION, POWDER, FOR SOLUTION INTRAVENOUS at 06:09

## 2024-12-09 RX ADMIN — Medication 3 MG: at 21:19

## 2024-12-09 RX ADMIN — LATANOPROST 1 DROP: 50 SOLUTION OPHTHALMIC at 21:18

## 2024-12-09 RX ADMIN — ESCITALOPRAM OXALATE 10 MG: 10 TABLET ORAL at 08:53

## 2024-12-09 RX ADMIN — CEFAZOLIN SODIUM 1000 MG: 1 SOLUTION INTRAVENOUS at 21:19

## 2024-12-09 RX ADMIN — DORZOLAMIDE HYDROCHLORIDE AND TIMOLOL MALEATE 1 DROP: 20; 5 SOLUTION OPHTHALMIC at 17:01

## 2024-12-09 RX ADMIN — ACETAMINOPHEN 650 MG: 325 TABLET, FILM COATED ORAL at 20:23

## 2024-12-09 RX ADMIN — METOPROLOL TARTRATE 25 MG: 25 TABLET, FILM COATED ORAL at 20:20

## 2024-12-09 RX ADMIN — ACETAMINOPHEN 650 MG: 325 TABLET, FILM COATED ORAL at 17:00

## 2024-12-09 RX ADMIN — PRAVASTATIN SODIUM 20 MG: 20 TABLET ORAL at 16:19

## 2024-12-09 RX ADMIN — DORZOLAMIDE HYDROCHLORIDE AND TIMOLOL MALEATE 1 DROP: 20; 5 SOLUTION OPHTHALMIC at 08:54

## 2024-12-09 RX ADMIN — Medication 2.5 MG: at 20:19

## 2024-12-09 RX ADMIN — APIXABAN 5 MG: 5 TABLET, FILM COATED ORAL at 08:53

## 2024-12-09 RX ADMIN — APIXABAN 5 MG: 5 TABLET, FILM COATED ORAL at 17:00

## 2024-12-09 RX ADMIN — CEFAZOLIN SODIUM 1000 MG: 1 SOLUTION INTRAVENOUS at 14:11

## 2024-12-09 NOTE — PROGRESS NOTES
Progress Note - Hospitalist   Name: Grace Clayton 89 y.o. female I MRN: [id number]  Unit/Bed#: W -01 I Date of Admission: 12/6/2024   Date of Service: 12/9/2024 I Hospital Day: 3    Assessment & Plan  UTI (urinary tract infection)  Presented due to worsening mental status. No SIRS, though CBC was not sent to lab and still pending  Currently afebrile without leukocytosis  UA obtained from [facility]: innumerable WBC, bacteria, nitrite positive.  Antibiotics: ceftriaxone initially held further doses given afebrile and ?colonization  Urine cultures growing Ecoli susceptible to Cefazolin with deescalate antibiotics    Paroxysmal atrial fibrillation (HCC)  Maintained on lopressor 25 mg bid, eliquis 2.5 mg bid   Increase eliquis to 5 mg bid and likely continue this dosing on dc  Essential hypertension  Maintained on metoprolol tartarte 25mg bid   BP is 126/680  Suspected dementia with agitation (HCC)  Currently oriented to self and place.  Follows commands.   Melatonin hs   Reorient   Bowel regimen as needed    Pressure ulcers of skin of multiple topographic sites  Multiple areas of skin breakdown.  Stage IV sacral wound present on admission.  Currently does not appear overtly infected.   Wound culture send in ED and pending  Wound care consult  Turn reposition q 1-2 hours   Dysphagia  Evaluated by speech during last hospitalization.    Dysphagia 1 with nectar thick.  Aspiration precautions  90 degrees for all PO intake and 30 minutes post PO meals  Small bites, slow rate of intake. Alternate sips and solids.    Goals of care, counseling/discussion  Reviewed code status with granddaughter over the phone. She confirmed patient is DNR/DNI  Moderate protein-calorie malnutrition (HCC)  BMI 26  Ensure pudding      VTE Pharmacologic Prophylaxis: VTE Score: 5 High Risk (Score >/= 5) - Pharmacological DVT Prophylaxis Ordered: apixaban (Eliquis). Sequential Compression Devices Ordered.    Mobility:   Basic Mobility Inpatient Raw  Score: 6  JH-HLM Goal: 2: Bed activities/Dependent transfer  JH-HLM Achieved: 2: Bed activities/Dependent transfer  JH-HLM Goal achieved. Continue to encourage appropriate mobility.    Patient Centered Rounds: I performed bedside rounds with nursing staff today.   Discussions with Specialists or Other Care Team Provider:   Case management    Education and Discussions with Family / Patient: Updated  (granddaughter) via phone.    Current Length of Stay: 3 day(s)  Current Patient Status: Inpatient   Certification Statement: The patient will continue to require additional inpatient hospital stay due to need for IV antibiotics  Discharge Plan: Anticipate discharge in 24-48 hrs to home with home services.    Code Status: Level 1 - Full Code    Subjective   Patient seen and examined. Resting comfortable in bed. No acute distress noted.    Objective :  Temp:  [97.6 °F (36.4 °C)-97.8 °F (36.6 °C)] 97.8 °F (36.6 °C)  HR:  [82-95] 95  BP: ()/(58-66) 110/66  Resp:  [16-18] 16  SpO2:  [98 %] 98 %  O2 Device: None (Room air)    There is no height or weight on file to calculate BMI.     Input and Output Summary (last 24 hours):     Intake/Output Summary (Last 24 hours) at 12/9/2024 1334  Last data filed at 12/9/2024 1300  Gross per 24 hour   Intake 1030 ml   Output 1050 ml   Net -20 ml       Physical Exam  Vitals and nursing note reviewed.   Constitutional:       General: She is not in acute distress.     Appearance: She is ill-appearing.   HENT:      Right Ear: Decreased hearing noted.      Left Ear: Decreased hearing noted.      Mouth/Throat:      Mouth: Mucous membranes are dry.   Eyes:      Conjunctiva/sclera: Conjunctivae normal.   Cardiovascular:      Rate and Rhythm: Normal rate and regular rhythm.      Pulses: Normal pulses.      Heart sounds: Normal heart sounds.   Pulmonary:      Effort: Pulmonary effort is normal.      Breath sounds: Normal breath sounds. No wheezing or rhonchi.   Abdominal:       General: There is no distension.      Palpations: Abdomen is soft.      Tenderness: There is no abdominal tenderness.   Musculoskeletal:         General: No swelling.   Skin:     General: Skin is warm.   Neurological:      Mental Status: She is alert.   Psychiatric:         Mood and Affect: Mood normal.         Behavior: Behavior normal.      Comments: AOX1           Lines/Drains:  Lines/Drains/Airways       Active Status       Name Placement date Placement time Site Days    Urethral Catheter 16 Fr. 12/06/24  1600  --  2                  Urinary Catheter:  Goal for removal: N/A - Chronic Chung                 Lab Results: I have reviewed the following results:   Results from last 7 days   Lab Units 12/09/24  0607   WBC Thousand/uL 8.15   HEMOGLOBIN g/dL 8.9*   HEMATOCRIT % 28.1*   PLATELETS Thousands/uL 286   SEGS PCT % 68   LYMPHO PCT % 21   MONO PCT % 8   EOS PCT % 2     Results from last 7 days   Lab Units 12/09/24  0607   SODIUM mmol/L 143   POTASSIUM mmol/L 3.8   CHLORIDE mmol/L 106   CO2 mmol/L 35*   BUN mg/dL 22   CREATININE mg/dL 0.50*   ANION GAP mmol/L 2*   CALCIUM mg/dL 8.0*   ALBUMIN g/dL 2.3*   TOTAL BILIRUBIN mg/dL 0.55   ALK PHOS U/L 57   ALT U/L 5*   AST U/L 11*   GLUCOSE RANDOM mg/dL 85     Results from last 7 days   Lab Units 12/06/24  1601   INR  1.49*             Results from last 7 days   Lab Units 12/06/24  1950 12/06/24  1654 12/06/24  1601   LACTIC ACID mmol/L 1.3 4.7*  --    PROCALCITONIN ng/ml  --   --  0.15       Recent Cultures (last 7 days):   Results from last 7 days   Lab Units 12/07/24  0828 12/06/24  1827 12/06/24  1655 12/06/24  1524   BLOOD CULTURE  No Growth at 24 hrs.  --   --  Staphylococcus epidermidis*   GRAM STAIN RESULT   --   --  No Polys or Bacteria seen Gram positive cocci in clusters*   URINE CULTURE   --  >100,000 cfu/ml Escherichia coli*  80,000-89,000 cfu/ml Enterococcus faecalis*  50,000-59,000 cfu/ml  --   --    WOUND CULTURE   --   --  2+ Growth of Staphylococcus  aureus*  2+ Growth of Pseudomonas aeruginosa*  1+ Growth of  --        Imaging Results Review: No pertinent imaging studies reviewed.  Other Study Results Review: No additional pertinent studies reviewed.    Last 24 Hours Medication List:     Current Facility-Administered Medications:     acetaminophen (TYLENOL) tablet 650 mg, Q6H PRN    apixaban (ELIQUIS) tablet 5 mg, BID    bisacodyl (DULCOLAX) rectal suppository 10 mg, Daily PRN    ceftriaxone (ROCEPHIN) 1 g/50 mL in dextrose IVPB, Q24H, Last Rate: 1,000 mg (12/09/24 0609)    dorzolamide-timolol (COSOPT) 2-0.5 % ophthalmic solution 1 drop, BID    escitalopram (LEXAPRO) tablet 10 mg, Daily    latanoprost (XALATAN) 0.005 % ophthalmic solution 1 drop, HS    melatonin tablet 3 mg, HS    metoprolol tartrate (LOPRESSOR) tablet 25 mg, Q12H ANY    pravastatin (PRAVACHOL) tablet 20 mg, Daily With Dinner    Administrative Statements   Today, Patient Was Seen By: ROSARIO Ferguson  I have spent a total time of 32 minutes in caring for this patient on the day of the visit/encounter including Diagnostic results, Risks and benefits of tx options, Instructions for management, Importance of tx compliance, Risk factor reductions, Impressions, Documenting in the medical record, Reviewing / ordering tests, medicine, procedures  , Obtaining or reviewing history  , and Communicating with other healthcare professionals .    **Please Note: This note may have been constructed using a voice recognition system.**
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
## 2024-12-09 NOTE — QUICK NOTE
Granddaughter, Molly, requesting medical update after hours.  Please provide update on 12/9/2024.    Spencer Clark, DO

## 2024-12-09 NOTE — PLAN OF CARE
Problem: Potential for Falls  Goal: Patient will remain free of falls  Description: INTERVENTIONS:  - Educate patient/family on patient safety including physical limitations  - Instruct patient to call for assistance with activity   - Consult OT/PT to assist with strengthening/mobility   - Keep Call bell within reach  - Keep bed low and locked with side rails adjusted as appropriate  - Keep care items and personal belongings within reach  - Initiate and maintain comfort rounds  - Make Fall Risk Sign visible to staff  - Offer Toileting every  Hours, in advance of need  - Initiate/Maintain alarm  - Obtain necessary fall risk management equipment  - Apply yellow socks and bracelet for high fall risk patients  - Consider moving patient to room near nurses station  Outcome: Progressing     Problem: Prexisting or High Potential for Compromised Skin Integrity  Goal: Skin integrity is maintained or improved  Description: INTERVENTIONS:  - Identify patients at risk for skin breakdown  - Assess and monitor skin integrity  - Assess and monitor nutrition and hydration status  - Monitor labs   - Assess for incontinence   - Turn and reposition patient  - Assist with mobility/ambulation  - Relieve pressure over bony prominences  - Avoid friction and shearing  - Provide appropriate hygiene as needed including keeping skin clean and dry  - Evaluate need for skin moisturizer/barrier cream  - Collaborate with interdisciplinary team   - Patient/family teaching  - Consider wound care consult   Outcome: Progressing     Problem: Nutrition/Hydration-ADULT  Goal: Nutrient/Hydration intake appropriate for improving, restoring or maintaining nutritional needs  Description: Monitor and assess patient's nutrition/hydration status for malnutrition. Collaborate with interdisciplinary team and initiate plan and interventions as ordered.  Monitor patient's weight and dietary intake as ordered or per policy. Utilize nutrition screening tool and  intervene as necessary. Determine patient's food preferences and provide high-protein, high-caloric foods as appropriate.     INTERVENTIONS:  - Monitor oral intake, urinary output, labs, and treatment plans  - Assess nutrition and hydration status and recommend course of action  - Evaluate amount of meals eaten  - Assist patient with eating if necessary   - Allow adequate time for meals  - Recommend/ encourage appropriate diets, oral nutritional supplements, and vitamin/mineral supplements  - Order, calculate, and assess calorie counts as needed  - Recommend, monitor, and adjust tube feedings and TPN/PPN based on assessed needs  - Assess need for intravenous fluids  - Provide specific nutrition/hydration education as appropriate  - Include patient/family/caregiver in decisions related to nutrition  Outcome: Progressing

## 2024-12-09 NOTE — CASE MANAGEMENT
Case Management Assessment & Discharge Planning Note    Patient name Grace Clayton  Location W /W -01 MRN [id number]  : 1925 Date 2024       Current Admission Date: 2024  Current Admission Diagnosis:UTI (urinary tract infection)   Patient Active Problem List    Diagnosis Date Noted Date Diagnosed    UTI (urinary tract infection) 2024     Palliative care by specialist 10/28/2024     Goals of care, counseling/discussion 10/28/2024     Moderate protein-calorie malnutrition (HCC) 10/28/2024     Elevated troponin 10/27/2024     Pressure ulcers of skin of multiple topographic sites 10/27/2024     Dysphagia 10/27/2024     Failure to thrive in adult 10/27/2024     Anemia 01/15/2024     Suspected dementia with agitation (HCC) 2024     RSV infection with wrosening cough 2024     GERD (gastroesophageal reflux disease) 2024     Non-MI troponin elevation 2024     Generalized weakness 2023     Paroxysmal atrial fibrillation (HCC) 2023     Ambulatory dysfunction 2023     Anxiety 2023     Hyperlipidemia 2012     Essential hypertension 2012       LOS (days): 3  Geometric Mean LOS (GMLOS) (days): 2.8  Days to GMLOS:0     OBJECTIVE:    Risk of Unplanned Readmission Score: 21.34         Current admission status: Inpatient       Preferred Pharmacy:   [id number] [address] [address]  Phone: [phone] Fax: [phone]    Primary Care Provider: No primary care provider on file.    Primary Insurance: MEDICARE  Secondary Insurance: UNITED AMERICAN INSURANCE    ASSESSMENT:  Active Health Care Proxies       Molly Braden Health Care Representative [facility]   Primary Phone: [phone] (Mobile)  Home Phone: [phone]                 Advance Directives  Does patient have a Health Care POA?: Yes (Placed in scan bin to be entered into EHR)  Does patient have  Advance Directives?: Yes  Advance Directives: Power of  for health care  Primary Contact: Molly Travis ([phone])    Readmission Root Cause  30 Day Readmission: No    Patient Information  Admitted from:: Home  Mental Status: Confused  During Assessment patient was accompanied by: Other-Comment (Granddaughter)  Assessment information provided by:: Other - please comment (Granddaughter)  Primary Caregiver: Family  Caregiver's Name:: Molly Travis  Caregiver's Relationship to Patient:: Family Member  Caregiver's Telephone Number:: [phone]  Support Systems: Family members  County of Residence: [place]  What city do you live in?: [place]  Home entry access options. Select all that apply.: Stairs  Number of steps to enter home.: 6  Do the steps have railings?: Yes  Type of Current Residence: [facility]  Living Arrangements: Lives w/ Extended Family (Granddaughter, Granddaughter's , granddaughter's children, pt's son)    Activities of Daily Living Prior to Admission  Functional Status: Total dependent  Completes ADLs independently?: No  Level of ADL dependence: Total Dependent  Ambulates independently?: No  Level of ambulatory dependence: Total Dependent  Does patient use assisted devices?: Yes  Assisted Devices (DME) used: Hospital Bed  Does patient currently own DME?: Yes  What DME does the patient currently own?: Hospital Bed, Wheelchair, Rollator  Does the patient have a history of Short-Term Rehab?: Yes ([facility])  Does patient have a history of H[facility]?: [facility]  Does patient currently have HHC?: Yes    Current Home Health Care  Type of Current Home Care Services: Nurse visit  Home Health Agency Name:: [facility]  Current Home Health Follow-Up Provider:: PCP    Patient Information Continued  Income Source: SSI/SSD  Does patient have prescription coverage?: Yes  Does patient have a history of substance abuse?: No  Does  patient have a history of Mental Health Diagnosis?: No    Means of Transportation  Means of Transport to Appts:: None (Virtual appts due to being bed bound)    DISCHARGE DETAILS:    Discharge planning discussed with:: Pt's granddaughterMolly CM contacted family/caregiver?: Yes (Spoke with granddaughter via phone)  Were Treatment Team discharge recommendations reviewed with patient/caregiver?: Yes  Did patient/caregiver verbalize understanding of patient care needs?: Yes  Were patient/caregiver advised of the risks associated with not following Treatment Team discharge recommendations?: Yes    Contacts  Patient Contacts: Molly Braden/Ivana  Relationship to Patient:: Family  Contact Method: Phone  Phone Number: [phone]  Reason/Outcome: Continuity of Care, Emergency Contact, Discharge Planning    Requested Home Health Care         [facility]    DME Referral Provided  Referral made for DME?: Yes  DME referral completed for the following items:: Other (Low air loss mattress for hospital bed)  DME Supplier Name:: [employer]    Other Referral/Resources/Interventions Provided:  Interventions: DME  Referral Comments: HHC referrals sent via [employer] as pt's daughter wishes to use an agency other than [facility]. Low air loss mattress ordered via [place] per granddaughter's request.     Additional Comments: Pt's granddaughter reports that pt resides with her, granddaughter's , granddaughter's children, and pt's son in a home that has a first floor setup. Pt is primarily bed bound and dependent for care with pt's granddaughter and granddaughter's  as primary caregivers. Pt has a hospital bed, wheelchair, and rollator however does not get out of bed typically. Pt's granddaughter reports that she's attempted to get a misa lift however it is not covered by insurance and she cannot cover out of pocket. Pt's granddaughter reports that pt is seen by [facility] for wound care  however would like a different agency at time of discharge as she is not satisfied with their care. In the past pt has been to [facility] and [facility] for STR, and has had [facility] for HHC services. Pt's granddaughter reports that she's not interested in STR for pt at this time as she provides full time care for pt at baseline. Pt's granddaughter is interested in pt returning home with [facility] services through a different agency than [facility], referrals placed via [employer]. Pt's granddaughter is also interested in a low air loss mattress for pt's hospital bed for time of discharge, order placed via [place].  department to follow.

## 2024-12-09 NOTE — ASSESSMENT & PLAN NOTE
Presented due to worsening mental status. No SIRS, though CBC was not sent to lab and still pending  Currently afebrile without leukocytosis  UA obtained from carcamo: innumerable WBC, bacteria, nitrite positive.  Antibiotics: ceftriaxone initially held further doses given afebrile and ?colonization  Urine cultures growing Ecoli susceptible to Cefazolin with deescalate antibiotics

## 2024-12-09 NOTE — PLAN OF CARE
Problem: Potential for Falls  Goal: Patient will remain free of falls  Description: INTERVENTIONS:  - Educate patient/family on patient safety including physical limitations  - Instruct patient to call for assistance with activity   - Consult OT/PT to assist with strengthening/mobility   - Keep Call bell within reach  - Keep bed low and locked with side rails adjusted as appropriate  - Keep care items and personal belongings within reach  - Initiate and maintain comfort rounds  - Make Fall Risk Sign visible to staff  - Offer Toileting every  Hours, in advance of need  - Initiate/Maintain alarm  - Obtain necessary fall risk management equipment:  Apply yellow socks and bracelet for high fall risk patients  - Consider moving patient to room near nurses station  Outcome: Progressing     Problem: Prexisting or High Potential for Compromised Skin Integrity  Goal: Skin integrity is maintained or improved  Description: INTERVENTIONS:  - Identify patients at risk for skin breakdown  - Assess and monitor skin integrity  - Assess and monitor nutrition and hydration status  - Monitor labs   - Assess for incontinence   - Turn and reposition patient  - Assist with mobility/ambulation  - Relieve pressure over bony prominences  - Avoid friction and shearing  - Provide appropriate hygiene as needed including keeping skin clean and dry  - Evaluate need for skin moisturizer/barrier cream  - Collaborate with interdisciplinary team   - Patient/family teaching  - Consider wound care consult   Outcome: Progressing     Problem: Nutrition/Hydration-ADULT  Goal: Nutrient/Hydration intake appropriate for improving, restoring or maintaining nutritional needs  Description: Monitor and assess patient's nutrition/hydration status for malnutrition. Collaborate with interdisciplinary team and initiate plan and interventions as ordered.  Monitor patient's weight and dietary intake as ordered or per policy. Utilize nutrition screening tool and  intervene as necessary. Determine patient's food preferences and provide high-protein, high-caloric foods as appropriate.     INTERVENTIONS:  - Monitor oral intake, urinary output, labs, and treatment plans  - Assess nutrition and hydration status and recommend course of action  - Evaluate amount of meals eaten  - Assist patient with eating if necessary   - Allow adequate time for meals  - Recommend/ encourage appropriate diets, oral nutritional supplements, and vitamin/mineral supplements  - Order, calculate, and assess calorie counts as needed  - Recommend, monitor, and adjust tube feedings and TPN/PPN based on assessed needs  - Assess need for intravenous fluids  - Provide specific nutrition/hydration education as appropriate  - Include patient/family/caregiver in decisions related to nutrition  Outcome: Progressing

## 2024-12-10 ENCOUNTER — HOME HEALTH ADMISSION (OUTPATIENT)
Dept: HOME HEALTH SERVICES | Facility: HOME HEALTHCARE | Age: 89
End: 2024-12-10
Payer: MEDICARE

## 2024-12-10 PROBLEM — T83.511A URINARY TRACT INFECTION ASSOCIATED WITH INDWELLING URETHRAL CATHETER  (HCC): Status: ACTIVE | Noted: 2024-12-06

## 2024-12-10 LAB
ALBUMIN SERPL BCG-MCNC: 2.2 G/DL (ref 3.5–5)
ALP SERPL-CCNC: 59 U/L (ref 34–104)
ALT SERPL W P-5'-P-CCNC: 4 U/L (ref 7–52)
ANION GAP SERPL CALCULATED.3IONS-SCNC: 2 MMOL/L (ref 4–13)
AST SERPL W P-5'-P-CCNC: 11 U/L (ref 13–39)
BASOPHILS # BLD AUTO: 0.06 THOUSANDS/ÂΜL (ref 0–0.1)
BASOPHILS NFR BLD AUTO: 1 % (ref 0–1)
BILIRUB SERPL-MCNC: 0.47 MG/DL (ref 0.2–1)
BUN SERPL-MCNC: 21 MG/DL (ref 5–25)
CALCIUM ALBUM COR SERPL-MCNC: 9.4 MG/DL (ref 8.3–10.1)
CALCIUM SERPL-MCNC: 8 MG/DL (ref 8.4–10.2)
CHLORIDE SERPL-SCNC: 105 MMOL/L (ref 96–108)
CO2 SERPL-SCNC: 35 MMOL/L (ref 21–32)
CREAT SERPL-MCNC: 0.56 MG/DL (ref 0.6–1.3)
EOSINOPHIL # BLD AUTO: 0.22 THOUSAND/ÂΜL (ref 0–0.61)
EOSINOPHIL NFR BLD AUTO: 3 % (ref 0–6)
ERYTHROCYTE [DISTWIDTH] IN BLOOD BY AUTOMATED COUNT: 18.3 % (ref 11.6–15.1)
GFR SERPL CREATININE-BSD FRML MDRD: 77 ML/MIN/1.73SQ M
GLUCOSE SERPL-MCNC: 107 MG/DL (ref 65–140)
HCT VFR BLD AUTO: 28.4 % (ref 34.8–46.1)
HGB BLD-MCNC: 8.8 G/DL (ref 11.5–15.4)
IMM GRANULOCYTES # BLD AUTO: 0.02 THOUSAND/UL (ref 0–0.2)
IMM GRANULOCYTES NFR BLD AUTO: 0 % (ref 0–2)
LYMPHOCYTES # BLD AUTO: 1.74 THOUSANDS/ÂΜL (ref 0.6–4.47)
LYMPHOCYTES NFR BLD AUTO: 23 % (ref 14–44)
MCH RBC QN AUTO: 31.5 PG (ref 26.8–34.3)
MCHC RBC AUTO-ENTMCNC: 31 G/DL (ref 31.4–37.4)
MCV RBC AUTO: 102 FL (ref 82–98)
MONOCYTES # BLD AUTO: 0.57 THOUSAND/ÂΜL (ref 0.17–1.22)
MONOCYTES NFR BLD AUTO: 8 % (ref 4–12)
NEUTROPHILS # BLD AUTO: 4.98 THOUSANDS/ÂΜL (ref 1.85–7.62)
NEUTS SEG NFR BLD AUTO: 65 % (ref 43–75)
NRBC BLD AUTO-RTO: 0 /100 WBCS
PLATELET # BLD AUTO: 277 THOUSANDS/UL (ref 149–390)
PMV BLD AUTO: 10.1 FL (ref 8.9–12.7)
POTASSIUM SERPL-SCNC: 4.2 MMOL/L (ref 3.5–5.3)
PROT SERPL-MCNC: 5.4 G/DL (ref 6.4–8.4)
RBC # BLD AUTO: 2.79 MILLION/UL (ref 3.81–5.12)
SODIUM SERPL-SCNC: 142 MMOL/L (ref 135–147)
WBC # BLD AUTO: 7.59 THOUSAND/UL (ref 4.31–10.16)

## 2024-12-10 PROCEDURE — 85025 COMPLETE CBC W/AUTO DIFF WBC: CPT | Performed by: NURSE PRACTITIONER

## 2024-12-10 PROCEDURE — 99232 SBSQ HOSP IP/OBS MODERATE 35: CPT | Performed by: HOSPITALIST

## 2024-12-10 PROCEDURE — 80053 COMPREHEN METABOLIC PANEL: CPT | Performed by: NURSE PRACTITIONER

## 2024-12-10 RX ADMIN — Medication 3 MG: at 22:08

## 2024-12-10 RX ADMIN — PRAVASTATIN SODIUM 20 MG: 20 TABLET ORAL at 17:42

## 2024-12-10 RX ADMIN — CEFAZOLIN SODIUM 1000 MG: 1 SOLUTION INTRAVENOUS at 05:27

## 2024-12-10 RX ADMIN — CEFAZOLIN SODIUM 1000 MG: 1 SOLUTION INTRAVENOUS at 22:09

## 2024-12-10 RX ADMIN — CEFAZOLIN SODIUM 1000 MG: 1 SOLUTION INTRAVENOUS at 14:54

## 2024-12-10 RX ADMIN — APIXABAN 5 MG: 5 TABLET, FILM COATED ORAL at 17:42

## 2024-12-10 RX ADMIN — ESCITALOPRAM OXALATE 10 MG: 10 TABLET ORAL at 09:44

## 2024-12-10 RX ADMIN — DORZOLAMIDE HYDROCHLORIDE AND TIMOLOL MALEATE 1 DROP: 20; 5 SOLUTION OPHTHALMIC at 17:46

## 2024-12-10 RX ADMIN — METOPROLOL TARTRATE 25 MG: 25 TABLET, FILM COATED ORAL at 22:08

## 2024-12-10 RX ADMIN — DORZOLAMIDE HYDROCHLORIDE AND TIMOLOL MALEATE 1 DROP: 20; 5 SOLUTION OPHTHALMIC at 09:45

## 2024-12-10 RX ADMIN — LATANOPROST 1 DROP: 50 SOLUTION OPHTHALMIC at 22:08

## 2024-12-10 RX ADMIN — METOPROLOL TARTRATE 25 MG: 25 TABLET, FILM COATED ORAL at 09:44

## 2024-12-10 RX ADMIN — APIXABAN 5 MG: 5 TABLET, FILM COATED ORAL at 09:44

## 2024-12-10 NOTE — PROGRESS NOTES
Progress Note - Hospitalist   Name: Grace Clayton 89 y.o. female I MRN: [id number]  Unit/Bed#: W -01 I Date of Admission: 12/6/2024   Date of Service: 12/10/2024 I Hospital Day: 4    Assessment & Plan  Urinary tract infection associated with indwelling urethral catheter  (HCC)  Unclear if truly an infection as has been intermittently treated with abx and urine cultures are growing Ecoli susceptible to Cefazolin with deescalate antibiotics.     Paroxysmal atrial fibrillation (HCC)  Maintained on lopressor 25 mg bid, eliquis to 5 mg bid   Essential hypertension  Maintained on metoprolol tartarte 25mg bid   BP is 126/680  Suspected dementia with agitation (HCC)  Currently oriented to self and place.  Follows commands.   Melatonin hs   Reorient   Bowel regimen as needed    Pressure ulcers of skin of multiple topographic sites  Multiple areas of skin breakdown.  Stage IV sacral wound present on admission.  Currently does not appear overtly infected.   Wound culture send in ED and pending  Wound care consult  Turn reposition q 1-2 hours   Dysphagia  Evaluated by speech during last hospitalization.    Dysphagia 1 with nectar thick.  Aspiration precautions  90 degrees for all PO intake and 30 minutes post PO meals  Small bites, slow rate of intake. Alternate sips and solids.    Goals of care, counseling/discussion  Reviewed code status with granddaughter over the phone. She confirmed patient is DNR/DNI  Moderate protein-calorie malnutrition (HCC)  BMI 26  Ensure pudding      VTE Pharmacologic Prophylaxis: VTE Score: 5 High Risk (Score >/= 5) - Pharmacological DVT Prophylaxis Ordered: apixaban (Eliquis). Sequential Compression Devices Ordered.    Mobility:   Basic Mobility Inpatient Raw Score: 6  JH-HLM Goal: 2: Bed activities/Dependent transfer  JH-HLM Achieved: 2: Bed activities/Dependent transfer  JH-HLM Goal NOT achieved. Continue with multidisciplinary rounding and encourage appropriate mobility to improve upon JH-HLM  goals.    Patient Centered Rounds: I performed bedside rounds with nursing staff today.   Discussions with Specialists or Other Care Team Provider:     Education and Discussions with Family / Patient: Updated  (daughter) via phone.    Current Length of Stay: 4 day(s)  Current Patient Status: Inpatient   Certification Statement:   Discharge Plan: Anticipate discharge tomorrow to home with home services.    Code Status: Level 3 - DNAR and DNI    Subjective   No overnight events per rn. Patient appears scared and nervous. Engages in 1 word answers.     Objective :  Temp:  [96.2 °F (35.7 °C)-97.5 °F (36.4 °C)] 97.5 °F (36.4 °C)  HR:  [] 78  BP: (113-115)/(75-77) 113/77  Resp:  [16-18] 16  SpO2:  [95 %-98 %] 96 %  O2 Device: None (Room air)    There is no height or weight on file to calculate BMI.     Input and Output Summary (last 24 hours):     Intake/Output Summary (Last 24 hours) at 12/10/2024 1253  Last data filed at 12/10/2024 0855  Gross per 24 hour   Intake 280 ml   Output 325 ml   Net -45 ml       Physical Exam  Multiple areas of bruising.       Lines/Drains:  Lines/Drains/Airways       Active Status       Name Placement date Placement time Site Days    Urethral Catheter 16 Fr. 12/06/24  1600  --  3                  Urinary Catheter:  Goal for removal: N/A - Chronic Chung                 Lab Results: I have reviewed the following results:   Results from last 7 days   Lab Units 12/10/24  0534   WBC Thousand/uL 7.59   HEMOGLOBIN g/dL 8.8*   HEMATOCRIT % 28.4*   PLATELETS Thousands/uL 277   SEGS PCT % 65   LYMPHO PCT % 23   MONO PCT % 8   EOS PCT % 3     Results from last 7 days   Lab Units 12/10/24  0534   SODIUM mmol/L 142   POTASSIUM mmol/L 4.2   CHLORIDE mmol/L 105   CO2 mmol/L 35*   BUN mg/dL 21   CREATININE mg/dL 0.56*   ANION GAP mmol/L 2*   CALCIUM mg/dL 8.0*   ALBUMIN g/dL 2.2*   TOTAL BILIRUBIN mg/dL 0.47   ALK PHOS U/L 59   ALT U/L 4*   AST U/L 11*   GLUCOSE RANDOM mg/dL 107      Results from last 7 days   Lab Units 12/06/24  1601   INR  1.49*             Results from last 7 days   Lab Units 12/06/24  1950 12/06/24  1654 12/06/24  1601   LACTIC ACID mmol/L 1.3 4.7*  --    PROCALCITONIN ng/ml  --   --  0.15       Recent Cultures (last 7 days):   Results from last 7 days   Lab Units 12/07/24  0828 12/06/24  1827 12/06/24  1655 12/06/24  1524   BLOOD CULTURE  No Growth at 48 hrs.  --   --  Staphylococcus epidermidis*   GRAM STAIN RESULT   --   --  No Polys or Bacteria seen Gram positive cocci in clusters*   URINE CULTURE   --  >100,000 cfu/ml Escherichia coli*  80,000-89,000 cfu/ml Enterococcus faecalis*  50,000-59,000 cfu/ml  --   --    WOUND CULTURE   --   --  2+ Growth of Staphylococcus aureus*  2+ Growth of Pseudomonas aeruginosa*  1+ Growth of  --        Imaging Results Review: No pertinent imaging studies reviewed.  Other Study Results Review: No additional pertinent studies reviewed.    Last 24 Hours Medication List:     Current Facility-Administered Medications:     acetaminophen (TYLENOL) tablet 650 mg, Q6H PRN    apixaban (ELIQUIS) tablet 5 mg, BID    bisacodyl (DULCOLAX) rectal suppository 10 mg, Daily PRN    ceFAZolin (ANCEF) IVPB (premix in dextrose) 1,000 mg 50 mL, Q8H, Last Rate: 1,000 mg (12/10/24 0527)    dorzolamide-timolol (COSOPT) 2-0.5 % ophthalmic solution 1 drop, BID    escitalopram (LEXAPRO) tablet 10 mg, Daily    latanoprost (XALATAN) 0.005 % ophthalmic solution 1 drop, HS    melatonin tablet 3 mg, HS    metoprolol tartrate (LOPRESSOR) tablet 25 mg, Q12H ANY    oxyCODONE (ROXICODONE) split tablet 2.5 mg, Q6H PRN    pravastatin (PRAVACHOL) tablet 20 mg, Daily With Dinner    Administrative Statements   Today, Patient Was Seen By: Antonia Agudelo MD  I have spent a total time of 35 minutes in caring for this patient on the day of the visit/encounter including Diagnostic results, Instructions for management, Counseling / Coordination of care, Documenting in the  medical record, Reviewing / ordering tests, medicine, procedures  , and Obtaining or reviewing history  .    **Please Note: This note may have been constructed using a voice recognition system.**

## 2024-12-10 NOTE — PROGRESS NOTES
Patient:  APOLINAR ROBLES    MRN:  [id number]    Jackelyn Request ID:  [id number]    Level of care reserved:  Home Health Agency    Partner Reserved:  [facility], [place] [address] [phone]    Clinical needs requested:    Geography searched:  [id number]    Start of Service:    Request sent:  9:46am EST on 12/10/2024 by Marissa Ma    Partner reserved:  11:27am EST on 12/10/2024 by Marissa Ma    Choice list shared:  11:24am EST on 12/10/2024 by Marissa Ma

## 2024-12-10 NOTE — ASSESSMENT & PLAN NOTE
Unclear if truly an infection as has been intermittently treated with abx and urine cultures are growing Ecoli susceptible to Cefazolin with deescalate antibiotics.

## 2024-12-10 NOTE — CASE MANAGEMENT
Case Management Discharge Planning Note    Patient name Grace Clayton  Location W /W -01 MRN [id number]  : 1925 Date 12/10/2024       Current Admission Date: 2024  Current Admission Diagnosis:UTI (urinary tract infection)   Patient Active Problem List    Diagnosis Date Noted Date Diagnosed    UTI (urinary tract infection) 2024     Palliative care by specialist 10/28/2024     Goals of care, counseling/discussion 10/28/2024     Moderate protein-calorie malnutrition (HCC) 10/28/2024     Elevated troponin 10/27/2024     Pressure ulcers of skin of multiple topographic sites 10/27/2024     Dysphagia 10/27/2024     Failure to thrive in adult 10/27/2024     Anemia 01/15/2024     Suspected dementia with agitation (HCC) 2024     RSV infection with wrosening cough 2024     GERD (gastroesophageal reflux disease) 2024     Non-MI troponin elevation 2024     Generalized weakness 2023     Paroxysmal atrial fibrillation (HCC) 2023     Ambulatory dysfunction 2023     Anxiety 2023     Hyperlipidemia 2012     Essential hypertension 2012       LOS (days): 4  Geometric Mean LOS (GMLOS) (days): 2.8  Days to GMLOS:-0.9     OBJECTIVE:  Risk of Unplanned Readmission Score: 19.49         Current admission status: Inpatient   Preferred Pharmacy:   [id number] [address] [address]  Phone: [phone] Fax: [phone]    Primary Care Provider: No primary care provider on file.    Primary Insurance: MEDICARE  Secondary Insurance: [employer]    DISCHARGE DETAILS:    Discharge planning discussed with:: granddaughter  Freedom of Choice: Yes  Comments - Freedom of Choice: Reviewed with granddaughter the [facility] agencies that are able to accept pt for home.  Granddaughter expressed wanting an agency that will not push for hospice as she is not at this point and wants to continue  to care for her grandmother as she is her 24 hour caregiver.  Granddaughter has chosen to have BRANDON come to the home.  CM contacted family/caregiver?: Yes  Were Treatment Team discharge recommendations reviewed with patient/caregiver?: Yes  Did patient/caregiver verbalize understanding of patient care needs?: Yes  Were patient/caregiver advised of the risks associated with not following Treatment Team discharge recommendations?: Yes    Contacts  Patient Contacts: Molly Braden/[place]o  Relationship to Patient:: Family  Contact Method: Phone  Reason/Outcome: Continuity of Care, Emergency Contact, Discharge Planning    Requested Home Health Care         Is the patient interested in HHC at discharge?: Yes  Home Health Discipline requested:: Home Health Aide, Nursing, Occupational Therapy, Physical Therapy  Home Health Agency Name:: [facility]  Home Health Follow-Up Provider:: CLAYTON  Home Health Services Needed:: Wound/Ostomy Care, Strengthening/Theraputic Exercises to Improve Function, Gait/ADL Training, Evaluate Functional Status and Safety  Homebound Criteria Met:: Requires the Assistance of Another Person for Safe Ambulation or to Leave the Home, Requires Medical Transportation  Supporting Clincal Findings:: Bed Bound or Wheelchair Bound, Cognitive Deficit Requiring the Assistance of Others     Other Referral/Resources/Interventions Provided:  Interventions: HHC  Referral Comments: BRANDON will be seeing pt at [place].  Contact information has been placed on AVS.     Treatment Team Recommendation: Home with Home Health Care  Discharge Destination Plan:: Home with Home Health Care   IMM Given (Date):: 12/10/24  IMM Given to:: Family  Family notified:: granddaughter     IMM reviewed with  granddaughter ,  granddaughter  agrees with discharge determination.

## 2024-12-10 NOTE — PHYSICAL THERAPY NOTE
Physical Therapy Screen    Patient Name: Grace Clayton    Today's Date: 12/10/2024     Problem List  Principal Problem:    Urinary tract infection associated with indwelling urethral catheter  (HCC)  Active Problems:    Paroxysmal atrial fibrillation (HCC)    Essential hypertension    Suspected dementia with agitation (HCC)    Pressure ulcers of skin of multiple topographic sites    Dysphagia    Goals of care, counseling/discussion    Moderate protein-calorie malnutrition (HCC)       Past Medical History  Past Medical History:   Diagnosis Date    Atrial fibrillation (HCC)     Dementia (HCC)     Hypertension         Past Surgical History  History reviewed. No pertinent surgical history.        12/10/24 9515   Note Type   Note type Screen   Additional Comments PT orders received. Chart reviewed. Per EMR pt is dependent at baseline, has a 24hr caregiver and receives HH services. At this time, pt does not present with need for IP PT services. Will remove pt from PT caseload.     Shaji Chaudhari, PT

## 2024-12-10 NOTE — CASE MANAGEMENT
Case Management Progress Note    Patient name Grace Clayton  Location W /W -01 MRN [id number]  : 1925 Date 12/10/2024       LOS (days): 4  Geometric Mean LOS (GMLOS) (days): 2.8  Days to GMLOS:-1        OBJECTIVE:        Current admission status: Inpatient  Preferred [facility]acy:   [id number] [address] [address]  Phone: [phone] Fax: [phone]    Primary Care Provider: No primary care provider on file.    Primary Insurance: MEDICARE  Secondary Insurance: [employer]URANCE    PROGRESS NOTE:      CM called granddaughter to inform her she would need to call [facility] in order to cancel services with them before BRANDON would be able to come into the home.  She stated she will do this right away that way BRANDON will have no issues coming to the home.      CM will continue to follow to assist with planning and DC.

## 2024-12-10 NOTE — OCCUPATIONAL THERAPY NOTE
Occupational Therapy Screen Note     Patient Name: Grace Clayton  Today's Date: 12/10/2024     12/10/24 7224   Note Type   Note type Screen   Additional Comments OT consult received and chart reviewed. Pt admit with UTI associated with indwelling catheter. Per CM note, pt has 24 hr care at home, is total dependent for ADLs and is bedbound. Pt w/ hx of dementia w/ behavioral disturbance. At this time, pt does not demonstrate need for skilled IPOT as she is at he baseline - will screen from IPOT caseload.     Batsheva Harrington MS OTR/L   [place] Licensure# [id number]

## 2024-12-10 NOTE — MALNUTRITION/BMI
This medical record reflects one or more clinical indicators suggestive of malnutrition and/or morbid obesity.    Malnutrition Findings:   Adult Malnutrition type: Acute illness (in the setting of chronic illness)  Adult Degree of Malnutrition: Malnutrition of moderate degree  Malnutrition Characteristics: Fat loss, Muscle loss                360 Statement: Moderate malnutrition related to inadequate oral intake as evidenced by temoral indentation, orbital hollow, loss of subcutaneous fat and muscle extremities, osseous. Currently treated with oral supplementation.    BMI Findings:           There is no height or weight on file to calculate BMI.     See Nutrition note dated 12/10/2024 for additional details.  Completed nutrition assessment is viewable in the nutrition documentation.

## 2024-12-11 LAB
ALBUMIN SERPL BCG-MCNC: 2.3 G/DL (ref 3.5–5)
ALP SERPL-CCNC: 71 U/L (ref 34–104)
ALT SERPL W P-5'-P-CCNC: <3 U/L (ref 7–52)
ANION GAP SERPL CALCULATED.3IONS-SCNC: 7 MMOL/L (ref 4–13)
AST SERPL W P-5'-P-CCNC: 11 U/L (ref 13–39)
BASOPHILS # BLD AUTO: 0.06 THOUSANDS/ÂΜL (ref 0–0.1)
BASOPHILS NFR BLD AUTO: 1 % (ref 0–1)
BILIRUB SERPL-MCNC: 0.51 MG/DL (ref 0.2–1)
BUN SERPL-MCNC: 19 MG/DL (ref 5–25)
CALCIUM ALBUM COR SERPL-MCNC: 9.7 MG/DL (ref 8.3–10.1)
CALCIUM SERPL-MCNC: 8.3 MG/DL (ref 8.4–10.2)
CHLORIDE SERPL-SCNC: 103 MMOL/L (ref 96–108)
CO2 SERPL-SCNC: 31 MMOL/L (ref 21–32)
CREAT SERPL-MCNC: 0.53 MG/DL (ref 0.6–1.3)
EOSINOPHIL # BLD AUTO: 0.1 THOUSAND/ÂΜL (ref 0–0.61)
EOSINOPHIL NFR BLD AUTO: 1 % (ref 0–6)
ERYTHROCYTE [DISTWIDTH] IN BLOOD BY AUTOMATED COUNT: 18.4 % (ref 11.6–15.1)
GFR SERPL CREATININE-BSD FRML MDRD: 78 ML/MIN/1.73SQ M
GLUCOSE SERPL-MCNC: 125 MG/DL (ref 65–140)
HCT VFR BLD AUTO: 32.7 % (ref 34.8–46.1)
HGB BLD-MCNC: 10 G/DL (ref 11.5–15.4)
IMM GRANULOCYTES # BLD AUTO: 0.06 THOUSAND/UL (ref 0–0.2)
IMM GRANULOCYTES NFR BLD AUTO: 1 % (ref 0–2)
LYMPHOCYTES # BLD AUTO: 1.16 THOUSANDS/ÂΜL (ref 0.6–4.47)
LYMPHOCYTES NFR BLD AUTO: 12 % (ref 14–44)
MCH RBC QN AUTO: 30.9 PG (ref 26.8–34.3)
MCHC RBC AUTO-ENTMCNC: 30.6 G/DL (ref 31.4–37.4)
MCV RBC AUTO: 101 FL (ref 82–98)
MONOCYTES # BLD AUTO: 0.41 THOUSAND/ÂΜL (ref 0.17–1.22)
MONOCYTES NFR BLD AUTO: 4 % (ref 4–12)
NEUTROPHILS # BLD AUTO: 7.6 THOUSANDS/ÂΜL (ref 1.85–7.62)
NEUTS SEG NFR BLD AUTO: 81 % (ref 43–75)
NRBC BLD AUTO-RTO: 0 /100 WBCS
PLATELET # BLD AUTO: 349 THOUSANDS/UL (ref 149–390)
PMV BLD AUTO: 10.1 FL (ref 8.9–12.7)
POTASSIUM SERPL-SCNC: 3.8 MMOL/L (ref 3.5–5.3)
PROT SERPL-MCNC: 5.6 G/DL (ref 6.4–8.4)
RBC # BLD AUTO: 3.24 MILLION/UL (ref 3.81–5.12)
SODIUM SERPL-SCNC: 141 MMOL/L (ref 135–147)
WBC # BLD AUTO: 9.39 THOUSAND/UL (ref 4.31–10.16)

## 2024-12-11 PROCEDURE — 85025 COMPLETE CBC W/AUTO DIFF WBC: CPT | Performed by: NURSE PRACTITIONER

## 2024-12-11 PROCEDURE — 99232 SBSQ HOSP IP/OBS MODERATE 35: CPT | Performed by: HOSPITALIST

## 2024-12-11 PROCEDURE — 80053 COMPREHEN METABOLIC PANEL: CPT | Performed by: NURSE PRACTITIONER

## 2024-12-11 RX ORDER — SODIUM CHLORIDE, SODIUM LACTATE, POTASSIUM CHLORIDE, CALCIUM CHLORIDE 600; 310; 30; 20 MG/100ML; MG/100ML; MG/100ML; MG/100ML
75 INJECTION, SOLUTION INTRAVENOUS CONTINUOUS
Status: DISCONTINUED | OUTPATIENT
Start: 2024-12-11 | End: 2024-12-11

## 2024-12-11 RX ORDER — SODIUM CHLORIDE, SODIUM LACTATE, POTASSIUM CHLORIDE, CALCIUM CHLORIDE 600; 310; 30; 20 MG/100ML; MG/100ML; MG/100ML; MG/100ML
75 INJECTION, SOLUTION INTRAVENOUS CONTINUOUS
Status: DISCONTINUED | OUTPATIENT
Start: 2024-12-11 | End: 2024-12-12

## 2024-12-11 RX ADMIN — PRAVASTATIN SODIUM 20 MG: 20 TABLET ORAL at 15:43

## 2024-12-11 RX ADMIN — SODIUM CHLORIDE, SODIUM LACTATE, POTASSIUM CHLORIDE, AND CALCIUM CHLORIDE 75 ML/HR: .6; .31; .03; .02 INJECTION, SOLUTION INTRAVENOUS at 18:13

## 2024-12-11 RX ADMIN — DORZOLAMIDE HYDROCHLORIDE AND TIMOLOL MALEATE 1 DROP: 20; 5 SOLUTION OPHTHALMIC at 18:14

## 2024-12-11 RX ADMIN — LATANOPROST 1 DROP: 50 SOLUTION OPHTHALMIC at 21:22

## 2024-12-11 RX ADMIN — CEFAZOLIN SODIUM 1000 MG: 1 SOLUTION INTRAVENOUS at 05:29

## 2024-12-11 RX ADMIN — CEFAZOLIN SODIUM 1000 MG: 1 SOLUTION INTRAVENOUS at 15:42

## 2024-12-11 RX ADMIN — METOPROLOL TARTRATE 25 MG: 25 TABLET, FILM COATED ORAL at 21:22

## 2024-12-11 RX ADMIN — DORZOLAMIDE HYDROCHLORIDE AND TIMOLOL MALEATE 1 DROP: 20; 5 SOLUTION OPHTHALMIC at 09:10

## 2024-12-12 LAB
ANION GAP SERPL CALCULATED.3IONS-SCNC: 6 MMOL/L (ref 4–13)
BACTERIA BLD CULT: NORMAL
BASOPHILS # BLD AUTO: 0.04 THOUSANDS/ÂΜL (ref 0–0.1)
BASOPHILS NFR BLD AUTO: 1 % (ref 0–1)
BUN SERPL-MCNC: 20 MG/DL (ref 5–25)
CALCIUM SERPL-MCNC: 7.9 MG/DL (ref 8.4–10.2)
CHLORIDE SERPL-SCNC: 105 MMOL/L (ref 96–108)
CO2 SERPL-SCNC: 30 MMOL/L (ref 21–32)
CREAT SERPL-MCNC: 0.57 MG/DL (ref 0.6–1.3)
EOSINOPHIL # BLD AUTO: 0.08 THOUSAND/ÂΜL (ref 0–0.61)
EOSINOPHIL NFR BLD AUTO: 1 % (ref 0–6)
ERYTHROCYTE [DISTWIDTH] IN BLOOD BY AUTOMATED COUNT: 18.2 % (ref 11.6–15.1)
GFR SERPL CREATININE-BSD FRML MDRD: 76 ML/MIN/1.73SQ M
GLUCOSE SERPL-MCNC: 119 MG/DL (ref 65–140)
HCT VFR BLD AUTO: 29.7 % (ref 34.8–46.1)
HGB BLD-MCNC: 9.1 G/DL (ref 11.5–15.4)
IMM GRANULOCYTES # BLD AUTO: 0.04 THOUSAND/UL (ref 0–0.2)
IMM GRANULOCYTES NFR BLD AUTO: 1 % (ref 0–2)
LYMPHOCYTES # BLD AUTO: 1.36 THOUSANDS/ÂΜL (ref 0.6–4.47)
LYMPHOCYTES NFR BLD AUTO: 16 % (ref 14–44)
MCH RBC QN AUTO: 31 PG (ref 26.8–34.3)
MCHC RBC AUTO-ENTMCNC: 30.6 G/DL (ref 31.4–37.4)
MCV RBC AUTO: 101 FL (ref 82–98)
MONOCYTES # BLD AUTO: 0.48 THOUSAND/ÂΜL (ref 0.17–1.22)
MONOCYTES NFR BLD AUTO: 6 % (ref 4–12)
NEUTROPHILS # BLD AUTO: 6.34 THOUSANDS/ÂΜL (ref 1.85–7.62)
NEUTS SEG NFR BLD AUTO: 75 % (ref 43–75)
NRBC BLD AUTO-RTO: 0 /100 WBCS
PLATELET # BLD AUTO: 313 THOUSANDS/UL (ref 149–390)
PMV BLD AUTO: 9.9 FL (ref 8.9–12.7)
POTASSIUM SERPL-SCNC: 3.3 MMOL/L (ref 3.5–5.3)
RBC # BLD AUTO: 2.94 MILLION/UL (ref 3.81–5.12)
SODIUM SERPL-SCNC: 141 MMOL/L (ref 135–147)
WBC # BLD AUTO: 8.34 THOUSAND/UL (ref 4.31–10.16)

## 2024-12-12 PROCEDURE — 80048 BASIC METABOLIC PNL TOTAL CA: CPT | Performed by: HOSPITALIST

## 2024-12-12 PROCEDURE — 99232 SBSQ HOSP IP/OBS MODERATE 35: CPT | Performed by: PHYSICIAN ASSISTANT

## 2024-12-12 PROCEDURE — 85025 COMPLETE CBC W/AUTO DIFF WBC: CPT | Performed by: HOSPITALIST

## 2024-12-12 RX ORDER — POTASSIUM CHLORIDE 20MEQ/15ML
20 LIQUID (ML) ORAL ONCE
Status: COMPLETED | OUTPATIENT
Start: 2024-12-12 | End: 2024-12-12

## 2024-12-12 RX ORDER — ACETAMINOPHEN 160 MG/5ML
650 SUSPENSION ORAL EVERY 4 HOURS PRN
Status: DISCONTINUED | OUTPATIENT
Start: 2024-12-12 | End: 2024-12-13 | Stop reason: HOSPADM

## 2024-12-12 RX ADMIN — SODIUM CHLORIDE, SODIUM LACTATE, POTASSIUM CHLORIDE, AND CALCIUM CHLORIDE 75 ML/HR: .6; .31; .03; .02 INJECTION, SOLUTION INTRAVENOUS at 08:12

## 2024-12-12 RX ADMIN — ESCITALOPRAM OXALATE 10 MG: 10 TABLET ORAL at 09:55

## 2024-12-12 RX ADMIN — DORZOLAMIDE HYDROCHLORIDE AND TIMOLOL MALEATE 1 DROP: 20; 5 SOLUTION OPHTHALMIC at 09:56

## 2024-12-12 RX ADMIN — METOPROLOL TARTRATE 25 MG: 25 TABLET, FILM COATED ORAL at 09:55

## 2024-12-12 RX ADMIN — APIXABAN 5 MG: 5 TABLET, FILM COATED ORAL at 17:02

## 2024-12-12 RX ADMIN — PRAVASTATIN SODIUM 20 MG: 20 TABLET ORAL at 17:01

## 2024-12-12 RX ADMIN — LATANOPROST 1 DROP: 50 SOLUTION OPHTHALMIC at 20:47

## 2024-12-12 RX ADMIN — APIXABAN 5 MG: 5 TABLET, FILM COATED ORAL at 09:55

## 2024-12-12 RX ADMIN — DORZOLAMIDE HYDROCHLORIDE AND TIMOLOL MALEATE 1 DROP: 20; 5 SOLUTION OPHTHALMIC at 17:02

## 2024-12-12 RX ADMIN — POTASSIUM CHLORIDE 20 MEQ: 20 SOLUTION ORAL at 17:01

## 2024-12-12 RX ADMIN — ACETAMINOPHEN 650 MG: 650 SUSPENSION ORAL at 13:50

## 2024-12-12 NOTE — ASSESSMENT & PLAN NOTE
Reviewed code status with granddaughter over the phone. She confirmed patient is DNR/DNI.  Also dw patient granddaughter that pt has not had anything to eat over the last 24 hrs and I am worried that her clinical prognosis could be headed to Failure to thrive, granddaughter wants to come in tomorrow or tonight to try to engage her to eat.\  I started her on IVF today to maintain hydration.

## 2024-12-12 NOTE — ASSESSMENT & PLAN NOTE
Poor PO intake since admission  Today ate 25% of breakfast and then granddaughter came in for lunch - had some peaches, thickened soda, mashed potatoes and pudding  Hold further IV fluids and encourage PO intake  If continues to eat dinner/breakfast - plan for discharge home on 12/13 afternoon with home transport [facility]  Updated home med list with granddaughter for discharge (not taking anything except metoprolol/eliquis consistently)

## 2024-12-12 NOTE — ASSESSMENT & PLAN NOTE
Multiple areas of skin breakdown.  Stage IV sacral wound present on admission.    Wound culture showing S. Aureus and P. Aeruginosa - no evidence of acute infection to need ABX  Wound care consult  Turn reposition q 1-2 hours

## 2024-12-12 NOTE — ASSESSMENT & PLAN NOTE
Unclear if truly an infection as has been intermittently treated with abx and urine cultures are growing Ecoli susceptible to Cefazolin with deescalate antibiotics. Cont to monitor off abx.

## 2024-12-12 NOTE — ASSESSMENT & PLAN NOTE
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

## 2024-12-12 NOTE — ASSESSMENT & PLAN NOTE
Evaluated by speech during last hospitalization but not this admission  Will consult speech therapy  At home uses regular water and pureed food.   Today granddaugther tried regular water (not nectar) and was coughing more  Await ST evaluation but for now will resume dysphagia level 1 and nectar thick  Aspiration precautions  Small bites, slow rate of intake. Alternate sips and solids.

## 2024-12-12 NOTE — PROGRESS NOTES
Progress Note - Hospitalist   Name: Grace Clayton 89 y.o. female I MRN: [id number]  Unit/Bed#: W -01 I Date of Admission: 12/6/2024   Date of Service: 12/11/2024 I Hospital Day: 5    Assessment & Plan  Urinary tract infection associated with indwelling urethral catheter  (HCC)  Unclear if truly an infection as has been intermittently treated with abx and urine cultures are growing Ecoli susceptible to Cefazolin with deescalate antibiotics. Cont to monitor off abx.     Paroxysmal atrial fibrillation (HCC)  Maintained on lopressor 25 mg bid, eliquis to 5 mg bid   Essential hypertension  Maintained on metoprolol tartarte 25mg bid   BP is 126/680  Suspected dementia with agitation (HCC)  Currently oriented to self and place.  Follows commands.   Melatonin hs   Reorient   Bowel regimen as needed    Pressure ulcers of skin of multiple topographic sites  Multiple areas of skin breakdown.  Stage IV sacral wound present on admission.  Currently does not appear overtly infected.   Wound culture send in ED and pending  Wound care consult  Turn reposition q 1-2 hours   Dysphagia  Evaluated by speech during last hospitalization.    Dysphagia 1 with nectar thick.  Aspiration precautions  90 degrees for all PO intake and 30 minutes post PO meals  Small bites, slow rate of intake. Alternate sips and solids.    Goals of care, counseling/discussion  Reviewed code status with granddaughter over the phone. She confirmed patient is DNR/DNI.  Also dw patient granddaughter that pt has not had anything to eat over the last 24 hrs and I am worried that her clinical prognosis could be headed to Failure to thrive, granddaughter wants to come in tomorrow or tonight to try to engage her to eat.\  I started her on IVF today to maintain hydration.     Moderate protein-calorie malnutrition (HCC)  BMI 26  Ensure pudding      VTE Pharmacologic Prophylaxis: VTE Score: 5 High Risk (Score >/= 5) - Pharmacological DVT Prophylaxis Ordered: apixaban  (Eliquis). Sequential Compression Devices Ordered.    Mobility:   Basic Mobility Inpatient Raw Score: 6  JH-HLM Goal: 2: Bed activities/Dependent transfer  JH-HLM Achieved: 1: Laying in bed  JH-HLM Goal NOT achieved. Continue with multidisciplinary rounding and encourage appropriate mobility to improve upon JH-HLM goals.    Patient Centered Rounds: I performed bedside rounds with nursing staff today.   Discussions with Specialists or Other Care Team Provider:     Education and Discussions with Family / Patient: Updated  (granddaughter) via phone.    Current Length of Stay: 5 day(s)  Current Patient Status: Inpatient   Certification Statement: The patient will continue to require additional inpatient hospital stay due to poor nutrition not tolerating any oral intake.   Discharge Plan: Anticipate discharge in 48 hrs to home with home services.    Code Status: Level 3 - DNAR and DNI    Subjective   Pt does not engage in conversation. Per RN no po intake since yesterday despite multiple attempts to feed her.     Objective :  Temp:  [96.3 °F (35.7 °C)-96.9 °F (36.1 °C)] 96.3 °F (35.7 °C)  HR:  [97] 97  BP: (111-129)/(54-87) 129/85  Resp:  [16] 16  SpO2:  [98 %] 98 %    There is no height or weight on file to calculate BMI.     Input and Output Summary (last 24 hours):     Intake/Output Summary (Last 24 hours) at 12/11/2024 2025  Last data filed at 12/11/2024 1952  Gross per 24 hour   Intake 0 ml   Output --   Net 0 ml       Physical Exam  Does not allow proper exam, screams when you approach her. Answers yes/no with repeated questioning.   Abd soft, nt, nd,   Frail, cachectic.     Lines/Drains:  Lines/Drains/Airways       Active Status       Name Placement date Placement time Site Days    Urethral Catheter Straight-tip 16 Fr. 12/10/24  1500  -- 1                  Urinary Catheter:  Goal for removal: N/A - Chronic Chung                 Lab Results: I have reviewed the following results:   Results from last 7  days   Lab Units 12/11/24  0527   WBC Thousand/uL 9.39   HEMOGLOBIN g/dL 10.0*   HEMATOCRIT % 32.7*   PLATELETS Thousands/uL 349   SEGS PCT % 81*   LYMPHO PCT % 12*   MONO PCT % 4   EOS PCT % 1     Results from last 7 days   Lab Units 12/11/24  0527   SODIUM mmol/L 141   POTASSIUM mmol/L 3.8   CHLORIDE mmol/L 103   CO2 mmol/L 31   BUN mg/dL 19   CREATININE mg/dL 0.53*   ANION GAP mmol/L 7   CALCIUM mg/dL 8.3*   ALBUMIN g/dL 2.3*   TOTAL BILIRUBIN mg/dL 0.51   ALK PHOS U/L 71   ALT U/L <3*   AST U/L 11*   GLUCOSE RANDOM mg/dL 125     Results from last 7 days   Lab Units 12/06/24  1601   INR  1.49*             Results from last 7 days   Lab Units 12/06/24  1950 12/06/24  1654 12/06/24  1601   LACTIC ACID mmol/L 1.3 4.7*  --    PROCALCITONIN ng/ml  --   --  0.15       Recent Cultures (last 7 days):   Results from last 7 days   Lab Units 12/07/24  0828 12/06/24  1827 12/06/24  1655 12/06/24  1524   BLOOD CULTURE  No Growth After 4 Days.  --   --  Staphylococcus epidermidis*   GRAM STAIN RESULT   --   --  No Polys or Bacteria seen Gram positive cocci in clusters*   URINE CULTURE   --  >100,000 cfu/ml Escherichia coli*  80,000-89,000 cfu/ml Enterococcus faecalis*  50,000-59,000 cfu/ml  --   --    WOUND CULTURE   --   --  2+ Growth of Staphylococcus aureus*  2+ Growth of Pseudomonas aeruginosa*  1+ Growth of  --        Imaging Results Review: No pertinent imaging studies reviewed.  Other Study Results Review: No additional pertinent studies reviewed.    Last 24 Hours Medication List:     Current Facility-Administered Medications:     acetaminophen (TYLENOL) tablet 650 mg, Q6H PRN    apixaban (ELIQUIS) tablet 5 mg, BID    bisacodyl (DULCOLAX) rectal suppository 10 mg, Daily PRN    dorzolamide-timolol (COSOPT) 2-0.5 % ophthalmic solution 1 drop, BID    escitalopram (LEXAPRO) tablet 10 mg, Daily    lactated ringers infusion, Continuous, Last Rate: 75 mL/hr (12/11/24 1813)    latanoprost (XALATAN) 0.005 % ophthalmic  solution 1 drop, HS    metoprolol tartrate (LOPRESSOR) tablet 25 mg, Q12H ANY    oxyCODONE (ROXICODONE) split tablet 2.5 mg, Q6H PRN    pravastatin (PRAVACHOL) tablet 20 mg, Daily With Dinner    Administrative Statements   Today, Patient Was Seen By: Antonia Agudelo MD  I have spent a total time of 40 minutes in caring for this patient on the day of the visit/encounter including Diagnostic results, Patient and family education, Importance of tx compliance, Risk factor reductions, Counseling / Coordination of care, and Obtaining or reviewing history  .    **Please Note: This note may have been constructed using a voice recognition system.**

## 2024-12-12 NOTE — ASSESSMENT & PLAN NOTE
Maintained on lopressor 25 mg BID  Eliquis was increased from home 2.5 mg BID to 5 mg BID given weight is not <60 kg

## 2024-12-12 NOTE — DISCHARGE INSTR - AVS FIRST PAGE
Dear Grace Clayton,     It was our pleasure to care for you here at [facility].  It is our hope that we were always able to meet and exceed the expected standards for your care during your stay.  You were hospitalized due to confusion .  You were cared for on the 4th floor under the service of Simran Lara PA-C with the [facility] who covers for your primary care physician (PCP), No primary care provider on file., while you were hospitalized.  If you have any questions or concerns related to this hospitalization, you may contact us at .  For follow up, we recommend that you follow up with your primary care physician.  Please review this entire discharge summary as additional general instructions may be provided later as well.  However, at this time we provide for you here, the most important instructions / recommendations at discharge:     Please continue encouragement with feeding of pureed foods and nectar thick liquids.  [facility] will contact you regarding home health services and wound care  Your eliquis has been increased from 2.5 to 5mg twice daily   Speech therapy is recommending puréed diet with nectar thick liquids and crushed meds with purées  Please follow up with your PCP in 1-2 weeks of discharge      Sincerely,     Simran Lara PA-C

## 2024-12-12 NOTE — PROGRESS NOTES
Progress Note - Hospitalist   Name: Grace Clayton 89 y.o. 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   Today granddaugther tried regular water (not nectar) and was coughing more  Await ST evaluation but for now will resume dysphagia level 1 and nectar thick  Aspiration precautions  Small bites, slow rate of intake. Alternate sips and solids.  Goals of care, counseling/discussion  Poor PO intake since admission  Today ate 25% of breakfast and then granddaughter came in for lunch - had some peaches, thickened soda, mashed potatoes and pudding  Hold further IV fluids and encourage PO intake  If continues to eat dinner/breakfast - plan for discharge home on 12/13 afternoon with home transport [place]  Updated home med list with granddaughter for discharge (not taking anything except metoprolol/eliquis consistently)  Paroxysmal atrial fibrillation (HCC)  Maintained on lopressor 25 mg BID  Eliquis was increased from home 2.5 mg BID to 5 mg BID given weight is not <60 kg  Essential hypertension  Maintained on metoprolol tartarte 25mg bid   SBP 110s  Suspected dementia with agitation (HCC)  Currently oriented to self and place.    Follows commands.   Continue melatonin  Pressure ulcers of skin of multiple topographic sites  Multiple areas of skin breakdown.  Stage IV sacral wound present on admission.    Wound culture showing S. Aureus and P. Aeruginosa - no evidence of acute infection to need ABX  Wound care  consult  Turn reposition q 1-2 hours   Moderate protein-calorie malnutrition (HCC)  BMI 26  Ensure added    VTE Pharmacologic Prophylaxis: VTE Score: 5 High Risk (Score >/= 5) - Pharmacological DVT Prophylaxis Ordered: apixaban (Eliquis). Sequential Compression Devices Ordered.    Mobility:   Basic Mobility Inpatient Raw Score: 6  JH-HLM Goal: 2: Bed activities/Dependent transfer  JH-HLM Achieved: 1: Laying in bed  JH-HLM Goal NOT achieved. Continue with multidisciplinary rounding and encourage appropriate mobility to improve upon JH-HLM goals.    Patient Centered Rounds: I performed bedside rounds with nursing staff today.   Discussions with Specialists or Other Care Team Provider: nursing    Education and Discussions with Family / Patient: Updated  (granddaughter) at bedside.    Current Length of Stay: 6 day(s)  Current Patient Status: Inpatient   Certification Statement: The patient will continue to require additional inpatient hospital stay due to monitoring PO intake to ensure adequate  Discharge Plan: Anticipate discharge tomorrow to home with home services.    Code Status: Level 3 - DNAR and DNI    Subjective   Per nursing staff ate 25% of breakfast  Discussed with granddaughter at bedside and she informed me she uses regular thickness water at home      Objective :  Temp:  [95.7 °F (35.4 °C)-96.3 °F (35.7 °C)] 95.7 °F (35.4 °C)  HR:  [76-79] 76  BP: (114-129)/(84-85) 119/84  Resp:  [16-19] 19  SpO2:  [97 %-98 %] 97 %    There is no height or weight on file to calculate BMI.     Input and Output Summary (last 24 hours):     Intake/Output Summary (Last 24 hours) at 12/12/2024 1423  Last data filed at 12/12/2024 1350  Gross per 24 hour   Intake 240 ml   Output 225 ml   Net 15 ml       Physical Exam  Vitals and nursing note reviewed.   Constitutional:       General: She is not in acute distress.     Appearance: Normal appearance. She is ill-appearing.   HENT:      Head: Normocephalic and  atraumatic.   Cardiovascular:      Rate and Rhythm: Normal rate. Rhythm irregular.   Pulmonary:      Effort: Pulmonary effort is normal.      Breath sounds: Normal breath sounds. No wheezing or rales.   Abdominal:      General: Bowel sounds are normal.      Palpations: Abdomen is soft.      Tenderness: There is no abdominal tenderness. There is no guarding.   Musculoskeletal:      Right lower leg: No edema.      Left lower leg: No edema.   Skin:     General: Skin is warm and dry.   Neurological:      Mental Status: She is alert.      Comments: Oriented to person. Recognizes her granddaughter, talks to her and repeats some words and says a few words to her as well.        Lines/Drains:  Lines/Drains/Airways       Active Status       Name Placement date Placement time Site Days    Urethral Catheter Straight-tip 16 Fr. 12/10/24  1500  -- 1                  Urinary Catheter:  Goal for removal: N/A - Chronic Chung           Lab Results: I have reviewed the following results:   Results from last 7 days   Lab Units 12/12/24  0548   WBC Thousand/uL 8.34   HEMOGLOBIN g/dL 9.1*   HEMATOCRIT % 29.7*   PLATELETS Thousands/uL 313   SEGS PCT % 75   LYMPHO PCT % 16   MONO PCT % 6   EOS PCT % 1     Results from last 7 days   Lab Units 12/12/24  0548 12/11/24  0527   SODIUM mmol/L 141 141   POTASSIUM mmol/L 3.3* 3.8   CHLORIDE mmol/L 105 103   CO2 mmol/L 30 31   BUN mg/dL 20 19   CREATININE mg/dL 0.57* 0.53*   ANION GAP mmol/L 6 7   CALCIUM mg/dL 7.9* 8.3*   ALBUMIN g/dL  --  2.3*   TOTAL BILIRUBIN mg/dL  --  0.51   ALK PHOS U/L  --  71   ALT U/L  --  <3*   AST U/L  --  11*   GLUCOSE RANDOM mg/dL 119 125     Results from last 7 days   Lab Units 12/06/24  1601   INR  1.49*             Results from last 7 days   Lab Units 12/06/24  1950 12/06/24  1654 12/06/24  1601   LACTIC ACID mmol/L 1.3 4.7*  --    PROCALCITONIN ng/ml  --   --  0.15       Recent Cultures (last 7 days):   Results from last 7 days   Lab Units 12/07/24  0828  12/06/24  1827 12/06/24  1655 12/06/24  1524   BLOOD CULTURE  No Growth After 5 Days.  --   --  Staphylococcus epidermidis*   GRAM STAIN RESULT   --   --  No Polys or Bacteria seen Gram positive cocci in clusters*   URINE CULTURE   --  >100,000 cfu/ml Escherichia coli*  80,000-89,000 cfu/ml Enterococcus faecalis*  50,000-59,000 cfu/ml  --   --    WOUND CULTURE   --   --  2+ Growth of Staphylococcus aureus*  2+ Growth of Pseudomonas aeruginosa*  1+ Growth of  --        Imaging Results Review: No pertinent imaging studies reviewed.  Other Study Results Review: No additional pertinent studies reviewed.    Last 24 Hours Medication List:     Current Facility-Administered Medications:     acetaminophen (TYLENOL) oral suspension 650 mg, Q4H PRN    apixaban (ELIQUIS) tablet 5 mg, BID    bisacodyl (DULCOLAX) rectal suppository 10 mg, Daily PRN    dorzolamide-timolol (COSOPT) 2-0.5 % ophthalmic solution 1 drop, BID    escitalopram (LEXAPRO) tablet 10 mg, Daily    latanoprost (XALATAN) 0.005 % ophthalmic solution 1 drop, HS    metoprolol tartrate (LOPRESSOR) tablet 25 mg, Q12H ANY    oxyCODONE (ROXICODONE) split tablet 2.5 mg, Q6H PRN    pravastatin (PRAVACHOL) tablet 20 mg, Daily With Dinner    Administrative Statements   Today, Patient Was Seen By: Kiarra Hernandez PA-C      **Please Note: This note may have been constructed using a voice recognition system.**

## 2024-12-13 VITALS
TEMPERATURE: 97.1 F | RESPIRATION RATE: 18 BRPM | SYSTOLIC BLOOD PRESSURE: 119 MMHG | DIASTOLIC BLOOD PRESSURE: 72 MMHG | OXYGEN SATURATION: 95 % | HEART RATE: 91 BPM

## 2024-12-13 PROCEDURE — 99239 HOSP IP/OBS DSCHRG MGMT >30: CPT

## 2024-12-13 PROCEDURE — 92610 EVALUATE SWALLOWING FUNCTION: CPT

## 2024-12-13 RX ADMIN — Medication 2.5 MG: at 13:06

## 2024-12-13 RX ADMIN — ESCITALOPRAM OXALATE 10 MG: 10 TABLET ORAL at 10:10

## 2024-12-13 RX ADMIN — METOPROLOL TARTRATE 25 MG: 25 TABLET, FILM COATED ORAL at 10:10

## 2024-12-13 RX ADMIN — DORZOLAMIDE HYDROCHLORIDE AND TIMOLOL MALEATE 1 DROP: 20; 5 SOLUTION OPHTHALMIC at 10:18

## 2024-12-13 RX ADMIN — APIXABAN 5 MG: 5 TABLET, FILM COATED ORAL at 10:10

## 2024-12-13 NOTE — ASSESSMENT & PLAN NOTE
Evaluated by speech during last hospitalization but not this admission  Will consult speech therapy  At home uses regular water and pureed food.   Today granddaugther tried regular water (not nectar) and was coughing more  Speech therapy evaluation: Recommending puréed level 1 diet with nectar thick liquids.  Allow sips of thin between meals after oral care..  Med should be crushed with purée  Aspiration precautions  Small bites, slow rate of intake. Alternate sips and solids.

## 2024-12-13 NOTE — SPEECH THERAPY NOTE
Speech-Language Pathology Bedside Swallow Evaluation        Patient Name: Grace Clayton    Today's Date: 12/13/2024     Problem List  Principal Problem:    Urinary tract infection associated with indwelling urethral catheter  (HCC)  Active Problems:    Paroxysmal atrial fibrillation (HCC)    Essential hypertension    Suspected dementia with agitation (HCC)    Pressure ulcers of skin of multiple topographic sites    Dysphagia    Goals of care, counseling/discussion    Moderate protein-calorie malnutrition (HCC)       Summary    Pt presents with baseline oropharyngeal dysphagia w/ mild aspiration risk with thin liquids as seen on VBS 2/21/24 showing small amt of aspiration w/ thin liquids and overt s/s on recent swallow evals (throat clearing and occasional cough).      Recommendations:   Diet: puree/level 1 diet and nectar thick liquids - allow sips of thin between meals after oral care  Meds: crushed with puree   Frequent Oral care: 2x/day  Aspiration precautions   Other Recommendations/ considerations: pt tent for discharge today at 1400.       Current Medical Status  Pt is a 89 y.o. female who presented to [facility]  with UTI.  Pt  presents with altered mental status.  Patient has chronic carcamo catheter and per ED, has not been changed for 7 weeks.  UA consistent with infection.  Patient has been started on ceftriaxone.      Past medical history:   Please see H&P for details    Special Studies:  CXR: 12/6/24 Possible small pleural effusions.   VBS: 2/21/24 Mild oral/pharyngeal dysphagia w/ limited consistencies (puree and liquids) characterized by delayed oral processing/transfer, decreased hyolaryngeal excursion, minimal epiglottic inversion and decreased airway entrance closure w/ trace penetration with thin liquids to VC.     Social/Education/Vocational Hx:  Pt lives with family    Swallow Information   Prior speech/swallowing tx: seen recently Oct 2024 and recommended for puree w/ NTL  Current Risks  for Dysphagia & Aspiration: known history of dysphagia and known history of aspiration  Current Symptoms/Concerns:  known hx of aspiration & dysphagia   Current Diet: puree/level 1 diet and nectar thick liquids   Baseline Diet: puree/level 1 diet and thin liquids- per dtr  Takes pills- crushed    Baseline Assessment   Behavior/Cognition: alert  Speech/Language Status: able to follow commands and limited verbal output  Patient Positioning: upright in bed     Swallow Mechanism Exam   Facial: symmetrical  Labial: WFL  Lingual: WFL  Velum: unable to visualize  Mandible: adequate ROM  Dentition: edentulous  Vocal quality:clear/adequate   Volitional Cough: strong/productive   Respiratory: RA    Consistencies Assessed and Performance   Consistencies Administered: thin liquids, nectar thick, and puree- pt seen at lunch with puree foods, magic cup, NTL and thin liquids by straw    Oral Stage: pt took small amts from tsp and sips of NTL by straw. Pt appeared w/ adequate oral control with NTL and thin liquids. Slow bolus manipulation and transfer.     Pharyngeal Stage: delayed swallow initiation, fair laryngeal excursion; no overt s/s aspiration w/ NTL and puree. Pt only took 3 sips of thin liquids with delayed throat clearing x1. Voice remained clear.       Esophageal Concerns: none reported      Results Reviewed with: patient   Dysphagia Goals: pt will tolerate puree with nectar thick lquids and sips of thin liquids between meals without s/s of aspiration x1-2 as needed if remains in hospital.   Discharge recommendation: no follow up needed for swallowing      Ofe Gipson MA CCC-SLP  Speech Pathologist  [place] license # [id number]  [place] license # [id number]  Available via Secure Chat

## 2024-12-13 NOTE — WOUND OSTOMY CARE
Progress Note - Wound   Grace Clayton 89 y.o. female MRN: [id number]  Unit/Bed#: W -01 Encounter: [id number]        Assessment:   Patient is seen for wound care follow-up. On assessment, patient is on p-500 low air loss mattress with pillows and wedges in place for offloading, alert and oriented to self, dependent for care, primary nurse at bedside for assessment.     Findings:  B/L heels are dry intact and artemio with no skin loss or wounds present. Recommend preventative Hydraguard Cream and proper offloading/ repositioning.      POA Sacrum Stage 4 Pressure Injury -  oval/irregular  shaped full thickness wound. Wound bed is 10 % exposed bone, 10% yellow moist slough, 80% pink/beefy red tissue. Undermining noted from 12 to 5 o'clock with greatest depth of 2 cm at approximately 4 o'clock. Moderate to large amount of thick tan and yellow drainage. Leonor wound is hyperpigmented and fragile. Recommend switch absorptive layer to mesal sheet to fluff into area of undermining.     Bilateral breast MASD/ITD - Wounds are oval with irregular border, partial thickness, 100% pink tissue, with small amount of tan and serous drainage noted. Leonor-wounds are fragile, with an irregular red rash, suspect fungal component. Continue Nystatin powder    Bilateral groin - Skin is dry, intact, with red irregular rash. Continue Nystatin powder     Left Posterior Arm - Wound is irregular in shape, partial thickness, 100% pink tissue, with scant amount of serosanguineous drainage noted. Leonor-wound is dry, intact, no redness.     Right anterior tibia- Wound is irregular in shape, partial thickness, 100% pink tissue, with scant amount of serosanguineous drainage noted. Leonor-wound is dry, intact, no redness.     No induration, fluctuance, odor, warmth/temperature differences, redness, or purulence noted to the above noted wounds and skin areas assessed. New dressings applied per orders listed below. Patient tolerated well- no s/s of non-verbal  pain or discomfort observed during the encounter. Bedside nurse aware of plan of care. See flow sheets for more detailed assessment findings.      Orders listed below and wound care will continue to follow, call or Secure Chat with questions.       Skin care Plan:  1-Sacrum- Cleanse with NSS, pat dry. Apply Mesalt sheet over wound bed, fluff into area of undermining and cover with Silicone bordered foam. Nacho with T for treatment. Change daily and PRN.   2-B/L breast and groin- Cleanse skin and wounds with soap and water, pat dry. Apply dusting of Nystatin powder 2 times a day as ordered.  3-B/L arm and right leg wounds- Cleanse wounds with NSS, pat dry. Apply Dermagran over wound beds, cover with 4x4, wrap with Atilio. Change every other day and as needed for soilage/displacement.   4-Moisturize skin daily with skin nourishing cream  5-Ehob cushion in chair when out of bed.  6-Hydraguard to bilateral buttock and heels BID and PRN.   7-Turn/reposition q2h for pressure re-distribution on skin.  8-Elevate heels to offload pressure  9-P-500 low air loss mattress.        WOUNDS:  Wound 10/27/24 Pressure Injury Sacrum Medial (Active)   Wound Image   12/12/24 1150   Wound Description Exposed bone;Beefy red;Fragile 12/12/24 1150   Pressure Injury Stage 4 12/12/24 1150   Leonor-wound Assessment Fragile;Erythema 12/12/24 1150   Wound Length (cm) 6 cm 12/12/24 1150   Wound Width (cm) 7 cm 12/12/24 1150   Wound Depth (cm) 0.5 cm 12/12/24 1150   Wound Surface Area (cm^2) 42 cm^2 12/12/24 1150   Wound Volume (cm^3) 21 cm^3 12/12/24 1150   Calculated Wound Volume (cm^3) 21 cm^3 12/12/24 1150   Change in Wound Size % 75 12/12/24 1150   Number of underminings 1 12/12/24 1150   Undermining 1 2 12/12/24 1150   Undermining 1 is depth extending from 12 to 5  12/12/24 1150   Drainage Amount Moderate 12/12/24 1150   Drainage Description Yellow;Foul smelling;Tan 12/12/24 1150   Non-staged Wound Description Full thickness 12/12/24 1154    Treatments Cleansed;Site care 12/12/24 1150   Dressing Mesalt;Foam, Silicon (eg. Allevyn, etc) 12/12/24 1150   Wound packed? No 12/12/24 1150   Dressing Changed New 12/12/24 1150   Patient Tolerance Tolerated well 12/12/24 1150   Dressing Status Clean;Dry;Intact 12/12/24 1150       Wound 10/27/24 Abrasion(s) Pretibial Distal;Right (Active)   Wound Image   12/12/24 1137   Wound Description [place] 12/08/24 1004   Leonor-wound Assessment Erythema;Fragile 12/08/24 2226   Wound Length (cm) 0.4 cm 12/12/24 1137   Wound Width (cm) 0.4 cm 12/12/24 1137   Wound Depth (cm) 0.1 cm 12/12/24 1137   Wound Surface Area (cm^2) 0.16 cm^2 12/12/24 1137   Wound Volume (cm^3) 0.016 cm^3 12/12/24 1137   Calculated Wound Volume (cm^3) 0.02 cm^3 12/12/24 1137   Change in Wound Size % 33.33 12/12/24 1137   Drainage Amount Scant 12/08/24 1004   Drainage Description Serosanguineous 12/08/24 1004   Non-staged Wound Description Partial thickness 12/08/24 1004   Treatments Cleansed;Site care;Irrigation with NSS 12/10/24 1100   Dressing Dermagran gauze;Gauze;Dry dressing 12/11/24 1100   Wound packed? No 12/08/24 1004   Dressing Changed Changed 12/12/24 1100   Patient Tolerance Tolerated well 12/12/24 1100   Dressing Status Clean;Dry;Intact 12/12/24 1100       Wound 10/29/24 Arm Left;Lower (Active)   Wound Image   12/12/24 1141   Wound Description [place] 12/08/24 1002   Leonor-wound Assessment Erythema;Fragile 12/08/24 2226   Wound Length (cm) 2 cm 12/12/24 1141   Wound Width (cm) 0.5 cm 12/12/24 1141   Wound Depth (cm) 0.1 cm 12/12/24 1141   Wound Surface Area (cm^2) 1 cm^2 12/12/24 1141   Wound Volume (cm^3) 0.1 cm^3 12/12/24 1141   Calculated Wound Volume (cm^3) 0.1 cm^3 12/12/24 1141   Change in Wound Size % 97.5 12/12/24 1141   Drainage Amount Small 12/08/24 1002   Drainage Description Serosanguineous 12/08/24 1002   Non-staged Wound Description Partial thickness 12/08/24 1002   Treatments Cleansed;Irrigation with NSS;Site care 12/08/24 1002    Dressing Dermagran gauze;Dry dressing;Gauze 12/11/24 1100   Wound packed? No 12/08/24 1002   Dressing Changed Changed 12/12/24 1100   Patient Tolerance Tolerated well 12/12/24 1100   Dressing Status Clean;Dry;Intact 12/12/24 1100       Wound 12/06/24 Breast Left (Active)   Wound Image    12/08/24 1006   Wound Description [place] 12/08/24 1006   Leonor-wound Assessment Excoriated;Erythema 12/08/24 2226   Wound Length (cm) 6 cm 12/08/24 1006   Wound Width (cm) 10 cm 12/08/24 1006   Wound Depth (cm) 0.1 cm 12/08/24 1006   Wound Surface Area (cm^2) 60 cm^2 12/08/24 1006   Wound Volume (cm^3) 6 cm^3 12/08/24 1006   Calculated Wound Volume (cm^3) 6 cm^3 12/08/24 1006   Drainage Amount Small 12/08/24 1006   Drainage Description Serous;Tan 12/08/24 1006   Non-staged Wound Description Partial thickness 12/08/24 1006   Treatments Cleansed;Site care 12/12/24 1100       Wound Breast Right;Lower (Active)   Wound Image   12/12/24 1147   Wound Description Fragile;[place] 12/12/24 1147   Leonor-wound Assessment Rash 12/12/24 1147   Wound Length (cm) 3.5 cm 12/08/24 1008   Wound Width (cm) 10 cm 12/12/24 1147   Wound Depth (cm) 0.1 cm 12/12/24 1147   Wound Surface Area (cm^2) 35 cm^2 12/08/24 1008   Wound Volume (cm^3) 3.5 cm^3 12/08/24 1008   Calculated Wound Volume (cm^3) 3.5 cm^3 12/08/24 1008   Drainage Amount Small 12/08/24 1008   Drainage Description Serous;Tan 12/08/24 1008   Non-staged Wound Description Partial thickness 12/08/24 1008   Treatments Cleansed;Site care 12/12/24 1100       Wound 12/12/24 Arm Anterior;Right;Lower (Active)   Wound Image   12/12/24 1145   Wound Description Pink 12/12/24 1145   Leonor-wound Assessment Hyperpigmented;Fragile 12/12/24 1145   Wound Length (cm) 1 cm 12/12/24 1145   Wound Width (cm) 0.5 cm 12/12/24 1145   Wound Depth (cm) 0.1 cm 12/12/24 1145   Wound Surface Area (cm^2) 0.5 cm^2 12/12/24 1145   Wound Volume (cm^3) 0.05 cm^3 12/12/24 1145   Calculated Wound Volume (cm^3) 0.05 cm^3 12/12/24 1145    Drainage Amount Scant 12/12/24 1145   Drainage Description Serosanguineous 12/12/24 1145   Non-staged Wound Description Partial thickness 12/12/24 1145   Treatments Cleansed;Site care 12/12/24 1145   Dressing Dermagran gauze;Dry dressing;ABD 12/12/24 1145   Wound packed? No 12/12/24 1145   Dressing Changed New 12/12/24 1100   Patient Tolerance Tolerated well 12/12/24 1145   Dressing Status Clean;Dry;Intact 12/12/24 1145                Dionna Valera RN, BSN, CCRN

## 2024-12-13 NOTE — CASE MANAGEMENT
Case Management Discharge Planning Note    Patient name Grace Clayton  Location W /W -01 MRN [id number]  : 1925 Date 2024       Current Admission Date: 2024  Current Admission Diagnosis:Urinary tract infection associated with indwelling urethral catheter  (HCC)   Patient Active Problem List    Diagnosis Date Noted Date Diagnosed    Urinary tract infection associated with indwelling urethral catheter  (HCC) 2024     Palliative care by specialist 10/28/2024     Goals of care, counseling/discussion 10/28/2024     Moderate protein-calorie malnutrition (HCC) 10/28/2024     Elevated troponin 10/27/2024     Pressure ulcers of skin of multiple topographic sites 10/27/2024     Dysphagia 10/27/2024     Failure to thrive in adult 10/27/2024     Anemia 01/15/2024     Suspected dementia with agitation (HCC) 2024     RSV infection with wrosening cough 2024     GERD (gastroesophageal reflux disease) 2024     Non-MI troponin elevation 2024     Generalized weakness 2023     Paroxysmal atrial fibrillation (HCC) 2023     Ambulatory dysfunction 2023     Anxiety 2023     Hyperlipidemia 2012     Essential hypertension 2012       LOS (days): 7  Geometric Mean LOS (GMLOS) (days): 3.2  Days to GMLOS:-3.6     OBJECTIVE:  Risk of Unplanned Readmission Score: 20.12         Current admission status: Inpatient   Preferred Pharmacy:   [id number] [address] [address]  Phone: [phone] Fax: [phone]    Primary Care Provider: No primary care provider on file.    Primary Insurance: MEDICARE  Secondary Insurance: [employer]    DISCHARGE DETAILS:     Pt is stable for DC to return home with [facility] as requested by the Granddaughter.  She only wants [facility] to transport and does not want any other agency to transport as she has had bad experiences with  them.      CM attempted to contact [facility] at [phone] and had to leave a message.  CM contacted them several times and left several messages.  Nick from [facility] then asked if CM was able to schedule it.  He was able to call and get transport arranged at 1400 as requested by the granddaughter.    Granddaughter is aware of all DC arrangements.      Manuela from [facility] has been notified of pt's DC to home.  She is aware pt's new HHC agency is [facility].    IMM reviewed with  granddaughter ,  granddaughter  agrees with discharge determination.

## 2024-12-13 NOTE — PLAN OF CARE
Problem: Potential for Falls  Goal: Patient will remain free of falls  Description: INTERVENTIONS:  - Educate patient/family on patient safety including physical limitations  - Instruct patient to call for assistance with activity   - Consult OT/PT to assist with strengthening/mobility   - Keep Call bell within reach  - Keep bed low and locked with side rails adjusted as appropriate  - Keep care items and personal belongings within reach  - Initiate and maintain comfort rounds  - Make Fall Risk Sign visible to staff  - Offer Toileting every 2 Hours, in advance of need  - Initiate/Maintain bed alarm  - Obtain necessary fall risk management equipment: yellow socks  - Apply yellow socks and bracelet for high fall risk patients  - Consider moving patient to room near nurses station  Outcome: Progressing     Problem: Prexisting or High Potential for Compromised Skin Integrity  Goal: Skin integrity is maintained or improved  Description: INTERVENTIONS:  - Identify patients at risk for skin breakdown  - Assess and monitor skin integrity  - Assess and monitor nutrition and hydration status  - Monitor labs   - Assess for incontinence   - Turn and reposition patient  - Assist with mobility/ambulation  - Relieve pressure over bony prominences  - Avoid friction and shearing  - Provide appropriate hygiene as needed including keeping skin clean and dry  - Evaluate need for skin moisturizer/barrier cream  - Collaborate with interdisciplinary team   - Patient/family teaching  - Consider wound care consult   Outcome: Progressing     Problem: Nutrition/Hydration-ADULT  Goal: Nutrient/Hydration intake appropriate for improving, restoring or maintaining nutritional needs  Description: Monitor and assess patient's nutrition/hydration status for malnutrition. Collaborate with interdisciplinary team and initiate plan and interventions as ordered.  Monitor patient's weight and dietary intake as ordered or per policy. Utilize nutrition  screening tool and intervene as necessary. Determine patient's food preferences and provide high-protein, high-caloric foods as appropriate.     INTERVENTIONS:  - Monitor oral intake, urinary output, labs, and treatment plans  - Assess nutrition and hydration status and recommend course of action  - Evaluate amount of meals eaten  - Assist patient with eating if necessary   - Allow adequate time for meals  - Recommend/ encourage appropriate diets, oral nutritional supplements, and vitamin/mineral supplements  - Order, calculate, and assess calorie counts as needed  - Recommend, monitor, and adjust tube feedings and TPN/PPN based on assessed needs  - Assess need for intravenous fluids  - Provide specific nutrition/hydration education as appropriate  - Include patient/family/caregiver in decisions related to nutrition  Outcome: Progressing

## 2024-12-13 NOTE — DISCHARGE SUMMARY
Discharge Summary - Hospitalist   Name: Grace Clayton 89 y.o. 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   Today granddaugther tried regular water (not nectar) and was coughing more  Speech therapy evaluation: Recommending puréed level 1 diet with nectar thick liquids.  Allow sips of thin between meals after oral care..  Med should be crushed with purée  Aspiration precautions  Small bites, slow rate of intake. Alternate sips and solids.  Goals of care, counseling/discussion  Poor PO intake since admission  Today ate 25% of breakfast and then granddaughter came in for lunch - had some peaches, thickened soda, mashed potatoes and pudding  Hold further IV fluids and encourage PO intake  If continues to eat dinner/breakfast   Patient had most of her breakfast this morning, and 50% of her lunch prior to speech therapy taking over for assessment.  Discussed encouragement with feeding to granddaughter, verbalizes understanding  Updated home med list with granddaughter for discharge (not taking anything except metoprolol/eliquis consistently)  Paroxysmal atrial fibrillation (HCC)  Maintained on lopressor 25 mg BID  Eliquis was increased from home 2.5 mg BID to 5 mg BID given weight is not <60 kg  Essential hypertension  Maintained on metoprolol tartarte 25mg bid   SBP 110s  Suspected dementia with agitation (HCC)  Currently oriented to self and place.    Follows commands.   Continue melatonin  Pressure ulcers of skin of  multiple topographic sites  Multiple areas of skin breakdown.  Stage IV sacral wound present on admission.    Wound culture showing S. Aureus and P. Aeruginosa - no evidence of acute infection to need ABX  Wound care consult  Turn reposition q 1-2 hours   Moderate protein-calorie malnutrition (HCC)  BMI 26  Ensure added     Medical Problems       Resolved Problems  Date Reviewed: 12/6/2024   None       Discharging Physician / Practitioner: Simran Lara PA-C  PCP: No primary care provider on file.  Admission Date:   Admission Orders (From admission, onward)       Ordered        12/06/24 1932  INPATIENT ADMISSION  Once                          Discharge Date: 12/13/24    Consultations During Hospital Stay:  None    Procedures Performed:   None    Significant Findings / Test Results:   XR chest 1 view portable 12/7/2024  Impression: Possible small pleural effusions.     Incidental Findings:   None    Test Results Pending at Discharge (will require follow up):   None     Outpatient Tests Requested:  None    Complications: None    Reason for Admission: Altered mental status    Hospital Course:   Grace Clayton is a 89 y.o. female patient who originally presented to the hospital on 12/6/2024 due to altered mental status.  Patient was treated in the hospital with IV antibiotics for urinary tract infection x 3 days.  Patient was evaluated by speech therapy given concerns for dysphagia and poor oral intake.  It was determined that patient needs constant encouragement to eat, but is eating up to 25 to 50% of each meal.  Concerns regarding p.o. intake were discussed extensively with granddaughter who states she will continue encouraging feeding.  She is recommended a dysphagia 1 puréed diet and nectar thick liquids on discharge with meds crushed in purée.  Patient is medically stable for discharge at this time and will return home with [facility] services. Supplies to be sent home with patient today    Please see above  list of diagnoses and related plan for additional information.     Condition at Discharge: stable    Discharge Day Visit / Exam:   Subjective:  Seen and examined. Per nursing staff, patient ate most of her breakfast and 50% of her lunch prior to being evaluated by speech therapy.   Vitals: Blood Pressure: 119/72 (12/13/24 1010)  Pulse: 91 (12/13/24 0826)  Temperature: (!) 97.1 °F (36.2 °C) (12/13/24 0826)  Temp Source: Rectal (12/13/24 0038)  Respirations: 18 (12/13/24 0826)  SpO2: 95 % (12/13/24 0826)  Physical Exam  Constitutional:       General: She is not in acute distress.     Appearance: She is ill-appearing.   HENT:      Mouth/Throat:      Mouth: Mucous membranes are moist.   Eyes:      Conjunctiva/sclera: Conjunctivae normal.   Cardiovascular:      Rate and Rhythm: Normal rate.   Pulmonary:      Effort: Pulmonary effort is normal.   Abdominal:      Palpations: Abdomen is soft.   Musculoskeletal:         General: Normal range of motion.   Skin:     General: Skin is warm and dry.   Neurological:      Mental Status: Mental status is at baseline.        Discussion with Family: Updated  (granddaughter) via phone.    Discharge instructions/Information to patient and family:   See after visit summary for information provided to patient and family.      Provisions for Follow-Up Care:  See after visit summary for information related to follow-up care and any pertinent home health orders.      Mobility at time of Discharge:   Basic Mobility Inpatient Raw Score: 6  JH-HLM Goal: 2: Bed activities/Dependent transfer  JH-HLM Achieved: 1: Laying in bed  HLM Goal NOT achieved. Continue to encourage mobility in post discharge setting.     Disposition:   Home with VNA Services (Reminder: Complete face to face encounter)    Planned Readmission: None    Discharge Medications:  See after visit summary for reconciled discharge medications provided to patient and/or family.      Administrative Statements   Discharge  Statement:  I have spent a total time of 47 minutes in caring for this patient on the day of the visit/encounter. >30 minutes of time was spent on: Diagnostic results, Prognosis, Risks and benefits of tx options, Instructions for management, Counseling / Coordination of care, Documenting in the medical record, Reviewing / ordering tests, medicine, procedures  , and Communicating with other healthcare professionals .    **Please Note: This note may have been constructed using a voice recognition system**

## 2024-12-13 NOTE — ASSESSMENT & PLAN NOTE
Poor PO intake since admission  Today ate 25% of breakfast and then granddaughter came in for lunch - had some peaches, thickened soda, mashed potatoes and pudding  Hold further IV fluids and encourage PO intake  If continues to eat dinner/breakfast   Patient had most of her breakfast this morning, and 50% of her lunch prior to speech therapy taking over for assessment.  Discussed encouragement with feeding to granddaughter, verbalizes understanding  Updated home med list with granddaughter for discharge (not taking anything except metoprolol/eliquis consistently)

## 2024-12-14 ENCOUNTER — HOME CARE VISIT (OUTPATIENT)
Dept: HOME HEALTH SERVICES | Facility: HOME HEALTHCARE | Age: 89
End: 2024-12-14
Payer: MEDICARE

## 2024-12-14 VITALS
DIASTOLIC BLOOD PRESSURE: 62 MMHG | RESPIRATION RATE: 20 BRPM | OXYGEN SATURATION: 96 % | TEMPERATURE: 98.6 F | SYSTOLIC BLOOD PRESSURE: 108 MMHG | HEART RATE: 84 BPM

## 2024-12-14 PROCEDURE — 10330081 VN NO-PAY CLAIM PROCEDURE

## 2024-12-14 PROCEDURE — 400013 VN SOC

## 2024-12-14 PROCEDURE — G0299 HHS/HOSPICE OF RN EA 15 MIN: HCPCS

## 2024-12-14 RX ORDER — METOPROLOL TARTRATE 25 MG/1
25 TABLET, FILM COATED ORAL EVERY 12 HOURS SCHEDULED
Qty: 60 TABLET | Refills: 0 | Status: SHIPPED | OUTPATIENT
Start: 2024-12-14 | End: 2025-01-13

## 2024-12-14 RX ORDER — NYSTATIN 100000 [USP'U]/G
POWDER TOPICAL 2 TIMES DAILY
Qty: 15 G | Refills: 0 | Status: SHIPPED | OUTPATIENT
Start: 2024-12-14

## 2024-12-15 NOTE — CASE COMMUNICATION
Ship to Pt. Home     Ordering MD Simran Lara PA-C (home health only)    Wound 1 sacral      Full    Frequency Daily     Wound 2 BL arm skin tears      Full    Frequency EOD       Gauze 4x4 ST 559301   30  Gauze Atilio stretch roll 4inch n/s 12 rolls per unit  301702   2  ABD 5x9 529409  10  Telfa pad 3x4 9973  14  Mesalt square 4x4? (not packing)   20 pieces  Silicone Foam sacral NOT STOCKED [id number]  ( pt. often soils dressing d ue to incontinence, caregiver may need to change more than 1x daily)

## 2024-12-15 NOTE — CASE COMMUNICATION
Medication discrepancies or Major drug interactions  Abnormal clinical findings none  This report is informational only, no response is needed  [facility] has Admitted your patient to Home Health service with the following disciplines: SN, PT, OT, MSW and HHA  Patient stated goals of care remain at home  Potential barriers to goal achievement comorbidities  Primary focus of home health care:Genitourinary  Anticipated visit pattern a nd next visit date 3w1 2w2 next visit planned for Tues 12/17  Thank you for allowing us to participate in the care of your patient.

## 2024-12-16 ENCOUNTER — HOME CARE VISIT (OUTPATIENT)
Dept: HOME HEALTH SERVICES | Facility: HOME HEALTHCARE | Age: 89
End: 2024-12-16
Payer: MEDICARE

## 2024-12-16 PROCEDURE — 10330064 BANDAGE, CNFRM 4X4.1YDS N/S LF (12RL/BG"

## 2024-12-16 PROCEDURE — G0156 HHCP-SVS OF AIDE,EA 15 MIN: HCPCS

## 2024-12-16 PROCEDURE — 10330064 SPONGE, GAUZE 12PLY STR 4X4" (1/PK 50/B"

## 2024-12-16 PROCEDURE — 10330064

## 2024-12-16 PROCEDURE — 10330064 FOAM, ADH SIL W/BORDER SACRAL 7X7" (10/"

## 2024-12-16 PROCEDURE — 10330064 PAD, ABD 5X9 STR LF (1/PK 20PK/BX) MGM1"

## 2024-12-17 ENCOUNTER — HOME CARE VISIT (OUTPATIENT)
Dept: HOME HEALTH SERVICES | Facility: HOME HEALTHCARE | Age: 89
End: 2024-12-17
Payer: MEDICARE

## 2024-12-17 ENCOUNTER — TELEMEDICINE (OUTPATIENT)
Dept: GERIATRICS | Facility: OTHER | Age: 89
End: 2024-12-17
Payer: MEDICARE

## 2024-12-17 DIAGNOSIS — I10 ESSENTIAL HYPERTENSION: Chronic | ICD-10-CM

## 2024-12-17 DIAGNOSIS — I48.0 PAROXYSMAL ATRIAL FIBRILLATION (HCC): ICD-10-CM

## 2024-12-17 DIAGNOSIS — T83.511D URINARY TRACT INFECTION ASSOCIATED WITH INDWELLING URETHRAL CATHETER, SUBSEQUENT ENCOUNTER: Primary | ICD-10-CM

## 2024-12-17 DIAGNOSIS — R13.10 DYSPHAGIA, UNSPECIFIED TYPE: ICD-10-CM

## 2024-12-17 DIAGNOSIS — E44.0 MODERATE PROTEIN-CALORIE MALNUTRITION (HCC): ICD-10-CM

## 2024-12-17 DIAGNOSIS — F03.911 DEMENTIA WITH AGITATION, UNSPECIFIED DEMENTIA SEVERITY, UNSPECIFIED DEMENTIA TYPE (HCC): ICD-10-CM

## 2024-12-17 DIAGNOSIS — N39.0 URINARY TRACT INFECTION ASSOCIATED WITH INDWELLING URETHRAL CATHETER, SUBSEQUENT ENCOUNTER: Primary | ICD-10-CM

## 2024-12-17 DIAGNOSIS — L89.90 PRESSURE ULCERS OF SKIN OF MULTIPLE TOPOGRAPHIC SITES: ICD-10-CM

## 2024-12-17 DIAGNOSIS — R26.2 AMBULATORY DYSFUNCTION: ICD-10-CM

## 2024-12-17 PROCEDURE — G0152 HHCP-SERV OF OT,EA 15 MIN: HCPCS | Performed by: OCCUPATIONAL THERAPIST

## 2024-12-17 PROCEDURE — G0299 HHS/HOSPICE OF RN EA 15 MIN: HCPCS

## 2024-12-17 PROCEDURE — 99205 OFFICE O/P NEW HI 60 MIN: CPT | Performed by: NURSE PRACTITIONER

## 2024-12-17 PROCEDURE — 99215 OFFICE O/P EST HI 40 MIN: CPT | Performed by: NURSE PRACTITIONER

## 2024-12-18 ENCOUNTER — HOME CARE VISIT (OUTPATIENT)
Dept: HOME HEALTH SERVICES | Facility: HOME HEALTHCARE | Age: 89
End: 2024-12-18
Payer: MEDICARE

## 2024-12-19 ENCOUNTER — HOME CARE VISIT (OUTPATIENT)
Dept: HOME HEALTH SERVICES | Facility: HOME HEALTHCARE | Age: 89
End: 2024-12-19
Payer: MEDICARE

## 2024-12-19 VITALS — HEART RATE: 102 BPM | DIASTOLIC BLOOD PRESSURE: 68 MMHG | OXYGEN SATURATION: 99 % | SYSTOLIC BLOOD PRESSURE: 114 MMHG

## 2024-12-19 VITALS
DIASTOLIC BLOOD PRESSURE: 70 MMHG | OXYGEN SATURATION: 98 % | SYSTOLIC BLOOD PRESSURE: 102 MMHG | TEMPERATURE: 98.3 F | HEART RATE: 90 BPM

## 2024-12-19 PROCEDURE — G0155 HHCP-SVS OF CSW,EA 15 MIN: HCPCS

## 2024-12-19 PROCEDURE — G0299 HHS/HOSPICE OF RN EA 15 MIN: HCPCS

## 2024-12-19 PROCEDURE — G0156 HHCP-SVS OF AIDE,EA 15 MIN: HCPCS

## 2024-12-20 NOTE — CASE COMMUNICATION
[facility] OT evaluation 12/17/24.    OT to continue 1 wk 1 the 2 wk 1 for caregiver training for ROM exercises/ activities and assistance for review/recommendation for DME to assist with prevention of pressure sores.

## 2024-12-21 ENCOUNTER — HOME CARE VISIT (OUTPATIENT)
Dept: HOME HEALTH SERVICES | Facility: HOME HEALTHCARE | Age: 89
End: 2024-12-21
Payer: MEDICARE

## 2024-12-21 VITALS
HEART RATE: 70 BPM | OXYGEN SATURATION: 95 % | DIASTOLIC BLOOD PRESSURE: 60 MMHG | TEMPERATURE: 97.8 F | RESPIRATION RATE: 20 BRPM | SYSTOLIC BLOOD PRESSURE: 104 MMHG

## 2024-12-21 PROCEDURE — G0299 HHS/HOSPICE OF RN EA 15 MIN: HCPCS

## 2024-12-23 ENCOUNTER — HOME CARE VISIT (OUTPATIENT)
Dept: HOME HEALTH SERVICES | Facility: HOME HEALTHCARE | Age: 89
End: 2024-12-23
Payer: MEDICARE

## 2024-12-23 VITALS
OXYGEN SATURATION: 99 % | DIASTOLIC BLOOD PRESSURE: 68 MMHG | RESPIRATION RATE: 18 BRPM | SYSTOLIC BLOOD PRESSURE: 114 MMHG | HEART RATE: 102 BPM | TEMPERATURE: 97.5 F

## 2024-12-23 PROCEDURE — G0299 HHS/HOSPICE OF RN EA 15 MIN: HCPCS

## 2024-12-24 ENCOUNTER — HOME CARE VISIT (OUTPATIENT)
Dept: HOME HEALTH SERVICES | Facility: HOME HEALTHCARE | Age: 89
End: 2024-12-24
Payer: MEDICARE

## 2024-12-24 PROCEDURE — G0156 HHCP-SVS OF AIDE,EA 15 MIN: HCPCS

## 2024-12-24 NOTE — CASE COMMUNICATION
OT contacted patient's caregiver to schedule a visit for today. Caregiver noted having family gathering and declined OT visit. OT to decrease visits to 1 visit in 7 days the week of 12/22/24 at caregiver's request.  OT to perform final visit on 12/26/24 for caregiver training for exercise program and assistance with recommendations for DME as appropriate.

## 2024-12-26 ENCOUNTER — HOME CARE VISIT (OUTPATIENT)
Dept: HOME HEALTH SERVICES | Facility: HOME HEALTHCARE | Age: 89
End: 2024-12-26
Payer: MEDICARE

## 2024-12-26 VITALS
OXYGEN SATURATION: 97 % | TEMPERATURE: 98.1 F | SYSTOLIC BLOOD PRESSURE: 120 MMHG | HEART RATE: 60 BPM | DIASTOLIC BLOOD PRESSURE: 82 MMHG

## 2024-12-26 PROCEDURE — G0299 HHS/HOSPICE OF RN EA 15 MIN: HCPCS

## 2024-12-26 PROCEDURE — G0152 HHCP-SERV OF OT,EA 15 MIN: HCPCS | Performed by: OCCUPATIONAL THERAPIST

## 2024-12-29 VITALS — OXYGEN SATURATION: 94 % | HEART RATE: 90 BPM

## 2024-12-29 NOTE — CASE COMMUNICATION
OT performed discharge today per plan of care with goals met for caregiver training.   OT assisted caregiver with provision/ review of upper extremity exercises/ activities to facilitate upper extremity mobility and prevention of upper extremity contractures. OT  reviewed lower extremity positioning to reduce risk for flexion contractures of patient's knees.  OT reviewed recommendations for DME and positioning to assist with prevention  of skin breakdown/ pressure sores.  Caregiver has good understand  of PROM exercises and upper extremity activities  to facilitate upper extremity movement. Caregiver also has good understanding of need for periodic positional changes and use of DME to reduce risk for pressure sores.   Caregiver also has understanding of positioning of lower extremity for reducing risk for contractures.    No additional OT services are planned at this [facility] as patient will continue as dependent for all self care/ daily activities due to history of significant dementia and has necessary caregiver assistance and DME.

## 2024-12-30 ENCOUNTER — HOME CARE VISIT (OUTPATIENT)
Dept: HOME HEALTH SERVICES | Facility: HOME HEALTHCARE | Age: 89
End: 2024-12-30
Payer: MEDICARE

## 2024-12-30 ENCOUNTER — HOSPITAL ENCOUNTER (INPATIENT)
Facility: HOSPITAL | Age: 89
LOS: 3 days | Discharge: HOSPICE | DRG: 871 | End: 2025-01-02
Attending: STUDENT IN AN ORGANIZED HEALTH CARE EDUCATION/TRAINING PROGRAM | Admitting: INTERNAL MEDICINE
Payer: MEDICARE

## 2024-12-30 ENCOUNTER — APPOINTMENT (INPATIENT)
Dept: CT IMAGING | Facility: HOSPITAL | Age: 89
DRG: 871 | End: 2024-12-30
Payer: MEDICARE

## 2024-12-30 ENCOUNTER — APPOINTMENT (EMERGENCY)
Dept: RADIOLOGY | Facility: HOSPITAL | Age: 89
DRG: 871 | End: 2024-12-30
Payer: MEDICARE

## 2024-12-30 DIAGNOSIS — I48.91 ATRIAL FIBRILLATION WITH RVR (HCC): ICD-10-CM

## 2024-12-30 DIAGNOSIS — B34.2 CORONAVIRUS INFECTION: ICD-10-CM

## 2024-12-30 DIAGNOSIS — R62.7 FAILURE TO THRIVE IN ADULT: ICD-10-CM

## 2024-12-30 DIAGNOSIS — N39.0 URINARY TRACT INFECTION ASSOCIATED WITH INDWELLING URETHRAL CATHETER, INITIAL ENCOUNTER  (HCC): Primary | ICD-10-CM

## 2024-12-30 DIAGNOSIS — T83.511A URINARY TRACT INFECTION ASSOCIATED WITH INDWELLING URETHRAL CATHETER, INITIAL ENCOUNTER  (HCC): Primary | ICD-10-CM

## 2024-12-30 DIAGNOSIS — L89.90 PRESSURE ULCERS OF SKIN OF MULTIPLE TOPOGRAPHIC SITES: ICD-10-CM

## 2024-12-30 DIAGNOSIS — U07.1 COVID-19: ICD-10-CM

## 2024-12-30 DIAGNOSIS — R79.89 ELEVATED TROPONIN I LEVEL: ICD-10-CM

## 2024-12-30 DIAGNOSIS — A41.9 SEPSIS (HCC): ICD-10-CM

## 2024-12-30 LAB
2HR DELTA HS TROPONIN: -9 NG/L
4HR DELTA HS TROPONIN: 13 NG/L
ALBUMIN SERPL BCG-MCNC: 2.3 G/DL (ref 3.5–5)
ALP SERPL-CCNC: 51 U/L (ref 34–104)
ALT SERPL W P-5'-P-CCNC: 14 U/L (ref 7–52)
ANION GAP SERPL CALCULATED.3IONS-SCNC: 18 MMOL/L (ref 4–13)
AST SERPL W P-5'-P-CCNC: 53 U/L (ref 13–39)
ATRIAL RATE: 150 BPM
BACTERIA UR QL AUTO: ABNORMAL /HPF
BASE EX.OXY STD BLDV CALC-SCNC: 86.5 % (ref 60–80)
BASE EXCESS BLDV CALC-SCNC: -1.8 MMOL/L
BASOPHILS # BLD AUTO: 0.04 THOUSANDS/ΜL (ref 0–0.1)
BASOPHILS NFR BLD AUTO: 1 % (ref 0–1)
BILIRUB SERPL-MCNC: 0.57 MG/DL (ref 0.2–1)
BILIRUB UR QL STRIP: NEGATIVE
BUDDING YEAST: PRESENT
BUN SERPL-MCNC: 22 MG/DL (ref 5–25)
CALCIUM ALBUM COR SERPL-MCNC: 8.8 MG/DL (ref 8.3–10.1)
CALCIUM SERPL-MCNC: 7.4 MG/DL (ref 8.4–10.2)
CARDIAC TROPONIN I PNL SERPL HS: 101 NG/L (ref ?–50)
CARDIAC TROPONIN I PNL SERPL HS: 114 NG/L (ref ?–50)
CARDIAC TROPONIN I PNL SERPL HS: 92 NG/L (ref ?–50)
CHLORIDE SERPL-SCNC: 100 MMOL/L (ref 96–108)
CLARITY UR: ABNORMAL
CO2 SERPL-SCNC: 24 MMOL/L (ref 21–32)
COLOR UR: YELLOW
CREAT SERPL-MCNC: 0.55 MG/DL (ref 0.6–1.3)
EOSINOPHIL # BLD AUTO: 0.02 THOUSAND/ΜL (ref 0–0.61)
EOSINOPHIL NFR BLD AUTO: 0 % (ref 0–6)
ERYTHROCYTE [DISTWIDTH] IN BLOOD BY AUTOMATED COUNT: 18.8 % (ref 11.6–15.1)
FLUAV AG UPPER RESP QL IA.RAPID: NEGATIVE
FLUBV AG UPPER RESP QL IA.RAPID: NEGATIVE
GFR SERPL CREATININE-BSD FRML MDRD: 77 ML/MIN/1.73SQ M
GLUCOSE SERPL-MCNC: 125 MG/DL (ref 65–140)
GLUCOSE SERPL-MCNC: 148 MG/DL (ref 65–140)
GLUCOSE UR STRIP-MCNC: NEGATIVE MG/DL
HCO3 BLDV-SCNC: 22.1 MMOL/L (ref 24–30)
HCT VFR BLD AUTO: 27.6 % (ref 34.8–46.1)
HGB BLD-MCNC: 8.5 G/DL (ref 11.5–15.4)
HGB UR QL STRIP.AUTO: ABNORMAL
HYALINE CASTS #/AREA URNS LPF: ABNORMAL /LPF
IMM GRANULOCYTES # BLD AUTO: 0.03 THOUSAND/UL (ref 0–0.2)
IMM GRANULOCYTES NFR BLD AUTO: 1 % (ref 0–2)
KETONES UR STRIP-MCNC: ABNORMAL MG/DL
LACTATE SERPL-SCNC: 1.8 MMOL/L (ref 0.5–2)
LACTATE SERPL-SCNC: 3 MMOL/L (ref 0.5–2)
LEUKOCYTE ESTERASE UR QL STRIP: ABNORMAL
LIPASE SERPL-CCNC: 9 U/L (ref 11–82)
LYMPHOCYTES # BLD AUTO: 1.03 THOUSANDS/ΜL (ref 0.6–4.47)
LYMPHOCYTES NFR BLD AUTO: 20 % (ref 14–44)
MAGNESIUM SERPL-MCNC: 2.1 MG/DL (ref 1.9–2.7)
MCH RBC QN AUTO: 32 PG (ref 26.8–34.3)
MCHC RBC AUTO-ENTMCNC: 30.8 G/DL (ref 31.4–37.4)
MCV RBC AUTO: 104 FL (ref 82–98)
MONOCYTES # BLD AUTO: 0.33 THOUSAND/ΜL (ref 0.17–1.22)
MONOCYTES NFR BLD AUTO: 7 % (ref 4–12)
MUCOUS THREADS UR QL AUTO: ABNORMAL
NEUTROPHILS # BLD AUTO: 3.65 THOUSANDS/ΜL (ref 1.85–7.62)
NEUTS SEG NFR BLD AUTO: 71 % (ref 43–75)
NITRITE UR QL STRIP: POSITIVE
NON-SQ EPI CELLS URNS QL MICRO: ABNORMAL /HPF
NRBC BLD AUTO-RTO: 0 /100 WBCS
O2 CT BLDV-SCNC: 11.4 ML/DL
PCO2 BLDV: 33.8 MM HG (ref 42–50)
PH BLDV: 7.43 [PH] (ref 7.3–7.4)
PH UR STRIP.AUTO: 7 [PH]
PHOSPHATE SERPL-MCNC: 3.3 MG/DL (ref 2.3–4.1)
PLATELET # BLD AUTO: 295 THOUSANDS/UL (ref 149–390)
PMV BLD AUTO: 9.8 FL (ref 8.9–12.7)
PO2 BLDV: 60.3 MM HG (ref 35–45)
POTASSIUM SERPL-SCNC: 3.7 MMOL/L (ref 3.5–5.3)
PROT SERPL-MCNC: 5.4 G/DL (ref 6.4–8.4)
PROT UR STRIP-MCNC: ABNORMAL MG/DL
QRS AXIS: 116 DEGREES
QRSD INTERVAL: 108 MS
QT INTERVAL: 258 MS
QTC INTERVAL: 418 MS
RBC # BLD AUTO: 2.66 MILLION/UL (ref 3.81–5.12)
RBC #/AREA URNS AUTO: ABNORMAL /HPF
SARS-COV+SARS-COV-2 AG RESP QL IA.RAPID: POSITIVE
SODIUM SERPL-SCNC: 142 MMOL/L (ref 135–147)
SP GR UR STRIP.AUTO: 1.01 (ref 1–1.03)
T WAVE AXIS: -51 DEGREES
T4 FREE SERPL-MCNC: 1.12 NG/DL (ref 0.61–1.12)
TSH SERPL DL<=0.05 MIU/L-ACNC: 10.85 UIU/ML (ref 0.45–4.5)
UROBILINOGEN UR STRIP-ACNC: <2 MG/DL
VENTRICULAR RATE: 158 BPM
WBC # BLD AUTO: 5.1 THOUSAND/UL (ref 4.31–10.16)
WBC #/AREA URNS AUTO: ABNORMAL /HPF
WBC CLUMPS # UR AUTO: PRESENT /UL

## 2024-12-30 PROCEDURE — 84100 ASSAY OF PHOSPHORUS: CPT | Performed by: STUDENT IN AN ORGANIZED HEALTH CARE EDUCATION/TRAINING PROGRAM

## 2024-12-30 PROCEDURE — 87086 URINE CULTURE/COLONY COUNT: CPT | Performed by: STUDENT IN AN ORGANIZED HEALTH CARE EDUCATION/TRAINING PROGRAM

## 2024-12-30 PROCEDURE — 81001 URINALYSIS AUTO W/SCOPE: CPT | Performed by: STUDENT IN AN ORGANIZED HEALTH CARE EDUCATION/TRAINING PROGRAM

## 2024-12-30 PROCEDURE — 87804 INFLUENZA ASSAY W/OPTIC: CPT | Performed by: STUDENT IN AN ORGANIZED HEALTH CARE EDUCATION/TRAINING PROGRAM

## 2024-12-30 PROCEDURE — 85025 COMPLETE CBC W/AUTO DIFF WBC: CPT | Performed by: STUDENT IN AN ORGANIZED HEALTH CARE EDUCATION/TRAINING PROGRAM

## 2024-12-30 PROCEDURE — 87077 CULTURE AEROBIC IDENTIFY: CPT | Performed by: STUDENT IN AN ORGANIZED HEALTH CARE EDUCATION/TRAINING PROGRAM

## 2024-12-30 PROCEDURE — 82805 BLOOD GASES W/O2 SATURATION: CPT | Performed by: STUDENT IN AN ORGANIZED HEALTH CARE EDUCATION/TRAINING PROGRAM

## 2024-12-30 PROCEDURE — 93005 ELECTROCARDIOGRAM TRACING: CPT

## 2024-12-30 PROCEDURE — 84484 ASSAY OF TROPONIN QUANT: CPT | Performed by: STUDENT IN AN ORGANIZED HEALTH CARE EDUCATION/TRAINING PROGRAM

## 2024-12-30 PROCEDURE — 99284 EMERGENCY DEPT VISIT MOD MDM: CPT

## 2024-12-30 PROCEDURE — 71260 CT THORAX DX C+: CPT

## 2024-12-30 PROCEDURE — 83690 ASSAY OF LIPASE: CPT | Performed by: STUDENT IN AN ORGANIZED HEALTH CARE EDUCATION/TRAINING PROGRAM

## 2024-12-30 PROCEDURE — 87811 SARS-COV-2 COVID19 W/OPTIC: CPT | Performed by: STUDENT IN AN ORGANIZED HEALTH CARE EDUCATION/TRAINING PROGRAM

## 2024-12-30 PROCEDURE — 99223 1ST HOSP IP/OBS HIGH 75: CPT | Performed by: INTERNAL MEDICINE

## 2024-12-30 PROCEDURE — 84484 ASSAY OF TROPONIN QUANT: CPT | Performed by: INTERNAL MEDICINE

## 2024-12-30 PROCEDURE — 84443 ASSAY THYROID STIM HORMONE: CPT | Performed by: STUDENT IN AN ORGANIZED HEALTH CARE EDUCATION/TRAINING PROGRAM

## 2024-12-30 PROCEDURE — 83735 ASSAY OF MAGNESIUM: CPT | Performed by: STUDENT IN AN ORGANIZED HEALTH CARE EDUCATION/TRAINING PROGRAM

## 2024-12-30 PROCEDURE — 99291 CRITICAL CARE FIRST HOUR: CPT | Performed by: STUDENT IN AN ORGANIZED HEALTH CARE EDUCATION/TRAINING PROGRAM

## 2024-12-30 PROCEDURE — 71045 X-RAY EXAM CHEST 1 VIEW: CPT

## 2024-12-30 PROCEDURE — 83605 ASSAY OF LACTIC ACID: CPT | Performed by: STUDENT IN AN ORGANIZED HEALTH CARE EDUCATION/TRAINING PROGRAM

## 2024-12-30 PROCEDURE — 80053 COMPREHEN METABOLIC PANEL: CPT | Performed by: STUDENT IN AN ORGANIZED HEALTH CARE EDUCATION/TRAINING PROGRAM

## 2024-12-30 PROCEDURE — 84439 ASSAY OF FREE THYROXINE: CPT | Performed by: STUDENT IN AN ORGANIZED HEALTH CARE EDUCATION/TRAINING PROGRAM

## 2024-12-30 PROCEDURE — 96365 THER/PROPH/DIAG IV INF INIT: CPT

## 2024-12-30 PROCEDURE — 36415 COLL VENOUS BLD VENIPUNCTURE: CPT | Performed by: STUDENT IN AN ORGANIZED HEALTH CARE EDUCATION/TRAINING PROGRAM

## 2024-12-30 PROCEDURE — 82948 REAGENT STRIP/BLOOD GLUCOSE: CPT

## 2024-12-30 PROCEDURE — 93010 ELECTROCARDIOGRAM REPORT: CPT | Performed by: INTERNAL MEDICINE

## 2024-12-30 PROCEDURE — 87147 CULTURE TYPE IMMUNOLOGIC: CPT | Performed by: STUDENT IN AN ORGANIZED HEALTH CARE EDUCATION/TRAINING PROGRAM

## 2024-12-30 PROCEDURE — 74177 CT ABD & PELVIS W/CONTRAST: CPT

## 2024-12-30 PROCEDURE — 87186 SC STD MICRODIL/AGAR DIL: CPT | Performed by: STUDENT IN AN ORGANIZED HEALTH CARE EDUCATION/TRAINING PROGRAM

## 2024-12-30 RX ORDER — LATANOPROST 50 UG/ML
1 SOLUTION/ DROPS OPHTHALMIC
Status: DISCONTINUED | OUTPATIENT
Start: 2024-12-30 | End: 2025-01-02 | Stop reason: HOSPADM

## 2024-12-30 RX ORDER — BISACODYL 10 MG
10 SUPPOSITORY, RECTAL RECTAL DAILY PRN
Status: DISCONTINUED | OUTPATIENT
Start: 2024-12-30 | End: 2025-01-02 | Stop reason: HOSPADM

## 2024-12-30 RX ORDER — METOPROLOL TARTRATE 25 MG/1
25 TABLET, FILM COATED ORAL EVERY 12 HOURS SCHEDULED
Status: DISCONTINUED | OUTPATIENT
Start: 2024-12-30 | End: 2024-12-30

## 2024-12-30 RX ORDER — METOPROLOL TARTRATE 25 MG/1
25 TABLET, FILM COATED ORAL EVERY 12 HOURS SCHEDULED
Status: DISCONTINUED | OUTPATIENT
Start: 2024-12-30 | End: 2025-01-02 | Stop reason: HOSPADM

## 2024-12-30 RX ORDER — ACETAMINOPHEN 325 MG/1
325 TABLET ORAL EVERY 6 HOURS SCHEDULED
Status: DISCONTINUED | OUTPATIENT
Start: 2024-12-30 | End: 2025-01-01

## 2024-12-30 RX ORDER — DORZOLAMIDE HYDROCHLORIDE AND TIMOLOL MALEATE 20; 5 MG/ML; MG/ML
1 SOLUTION/ DROPS OPHTHALMIC 2 TIMES DAILY
Status: DISCONTINUED | OUTPATIENT
Start: 2024-12-30 | End: 2025-01-02 | Stop reason: HOSPADM

## 2024-12-30 RX ORDER — SODIUM CHLORIDE, SODIUM GLUCONATE, SODIUM ACETATE, POTASSIUM CHLORIDE, MAGNESIUM CHLORIDE, SODIUM PHOSPHATE, DIBASIC, AND POTASSIUM PHOSPHATE .53; .5; .37; .037; .03; .012; .00082 G/100ML; G/100ML; G/100ML; G/100ML; G/100ML; G/100ML; G/100ML
500 INJECTION, SOLUTION INTRAVENOUS ONCE
Status: COMPLETED | OUTPATIENT
Start: 2024-12-30 | End: 2024-12-30

## 2024-12-30 RX ADMIN — ACETAMINOPHEN 325 MG: 325 TABLET, FILM COATED ORAL at 18:34

## 2024-12-30 RX ADMIN — CEFEPIME 1000 MG: 1 INJECTION, POWDER, FOR SOLUTION INTRAMUSCULAR; INTRAVENOUS at 14:18

## 2024-12-30 RX ADMIN — LATANOPROST 1 DROP: 50 SOLUTION OPHTHALMIC at 22:39

## 2024-12-30 RX ADMIN — VANCOMYCIN HYDROCHLORIDE 1250 MG: 5 INJECTION, POWDER, LYOPHILIZED, FOR SOLUTION INTRAVENOUS at 15:00

## 2024-12-30 RX ADMIN — METOPROLOL TARTRATE 25 MG: 25 TABLET, FILM COATED ORAL at 16:00

## 2024-12-30 RX ADMIN — DORZOLAMIDE HYDROCHLORIDE AND TIMOLOL MALEATE 1 DROP: 20; 5 SOLUTION OPHTHALMIC at 18:47

## 2024-12-30 RX ADMIN — SODIUM CHLORIDE, SODIUM GLUCONATE, SODIUM ACETATE, POTASSIUM CHLORIDE, MAGNESIUM CHLORIDE, SODIUM PHOSPHATE, DIBASIC, AND POTASSIUM PHOSPHATE 500 ML: .53; .5; .37; .037; .03; .012; .00082 INJECTION, SOLUTION INTRAVENOUS at 12:16

## 2024-12-30 RX ADMIN — APIXABAN 2.5 MG: 2.5 TABLET, FILM COATED ORAL at 18:34

## 2024-12-30 RX ADMIN — IOHEXOL 70 ML: 350 INJECTION, SOLUTION INTRAVENOUS at 18:10

## 2024-12-30 RX ADMIN — DEXTROSE AND SODIUM CHLORIDE 500 ML: 5; .9 INJECTION, SOLUTION INTRAVENOUS at 17:27

## 2024-12-30 NOTE — ASSESSMENT & PLAN NOTE
Meeting criteria for severe sepsis given low blood pressure earlier this morning in the emergency department.  Also with lactic of 3  Source could be UTI versus COVID  Will give IV fluids  Got 500 mL in the ED, will give 500 more  Sinew antibiotics, trend procalcitonin    Background of sacral wound, will need wound care this is possibly also a source also has a history of constipation.  Get CT abdomen

## 2024-12-30 NOTE — H&P
H&P - Hospitalist   Name: Grace Clayton 89 y.o. female I MRN: [id number]  Unit/Bed#: ED-08 I Date of Admission: 12/30/2024   Date of Service: 12/30/2024 I Hospital Day: 0     Assessment & Plan  Sepsis (HCC)  Meeting criteria for severe sepsis given low blood pressure earlier this morning in the emergency department.  Also with lactic of 3  Source could be UTI versus COVID  Will give IV fluids  Got 500 mL in the ED, will give 500 more  Sinew antibiotics, trend procalcitonin    Background of sacral wound, will need wound care this is possibly also a source also has a history of constipation.  Get CT abdomen  COVID-19  High risk given age will start treatment  UTI (urinary tract infection)  UA positive for nitrates and leukocytes,  Continue antibiotics  Follow-up on cultures  Ambulatory dysfunction  PT OT  Paroxysmal atrial fibrillation (HCC)  Heart rate in the 100s  Rate controlled and on anticoagulation  Essential hypertension  Blood pressure is 152/83  Elevated troponin  Likely in the setting of infection  Will trend will monitor on telemetry  Elevated TSH  Check T4        VTE Pharmacologic Prophylaxis:   Moderate Risk (Score 3-4) - Pharmacological DVT Prophylaxis Ordered: apixaban (Eliquis).  Code Status: Level 3 - DNAR and DNI   Discussion with family:  called Molly - . Updated     Anticipated Length of Stay: Patient will be admitted on an inpatient basis with an anticipated length of stay of greater than 2 midnights secondary to sepsis.    History of Present Illness   Chief Complaint:     Grace Clayton is a 89 y.o. female with a PMH of dementia, recent hospitalization for urinary tract infection, who presents with altered mental status and overall decline.  She lives with her granddaughter and in her granddaughter's house there have been multiple family members who have been sick with a viral infection.    On my assessment she is tachycardic but her rates are controlled 100-110, her breathing is slightly labored and  "she is making eye contact but does appear slightly distressed.    She repeats the words \"I am cold\", when asked specifically if she is in pain she does not respond.    As per her granddaughter she is less talkative than her baseline.      Her UA is abnormal and she is also COVID-positive, lactic acid is elevated as well and she has a high troponin but this is trending down.          Review of Systems   Unable to perform ROS: Mental status change       Historical Information   Past Medical History:   Diagnosis Date    Atrial fibrillation (HCC)     Dementia (HCC)     Hypertension      No past surgical history on file.  Social History     Tobacco Use    Smoking status: Never    Smokeless tobacco: Never   Substance and Sexual Activity    Alcohol use: Never    Drug use: Never    Sexual activity: Not on file     E-Cigarette/Vaping     E-Cigarette/Vaping Substances     No family history on file.  Social History:  Marital Status: Unknown   Occupation: retired.   Patient Pre-hospital Living Situation: Home  Patient Pre-hospital Level of Mobility: non-ambulatory/bed bound  Patient Pre-hospital Diet Restrictions: none.     Meds/Allergies   I did not review the meds with the patient     Prior to Admission medications    Medication Sig Start Date End Date Taking? Authorizing Provider   acetaminophen (TYLENOL) 650 mg CR tablet Take 650 mg by mouth every 8 (eight) hours as needed for mild pain    Historical Provider, MD   apixaban (ELIQUIS) 5 mg Take 1 tablet (5 mg total) by mouth 2 (two) times a day 12/13/24   Simran Lara PA-C   bisacodyl (Dulcolax) 10 mg suppository Insert 10 mg into the rectum daily as needed for constipation    Historical Provider, MD   collagenase (SANTYL) ointment Apply topically daily  Patient not taking: Reported on 12/14/2024 11/1/24   Parth Cantrell MD   dorzolamide-timolol (COSOPT) 2-0.5 % ophthalmic solution Administer 1 drop to both eyes 2 (two) times a day 2/7/24   Historical Provider, MD "   latanoprost (XALATAN) 0.005 % ophthalmic solution Administer 1 drop to both eyes daily 2/7/24   Historical Provider, MD   Melatonin 5 MG/15ML LIQD Take 5 mg by mouth daily at bedtime as needed (insomina) 1/2/24   Historical Provider, MD   metoprolol tartrate (LOPRESSOR) 25 mg tablet Take 1 tablet (25 mg total) by mouth every 12 (twelve) hours 12/14/24 1/13/25  Simran Lara PA-C   mineral oil enema Insert 1 enema into the rectum daily as needed for constipation    Historical Provider, MD   nystatin (MYCOSTATIN) powder Apply topically 2 (two) times a day 12/14/24   Simran Lara PA-C   sodium phosphate 3 mmol/mL Insert 1 enema into the rectum daily as needed (constipation)    Historical Provider, MD     Allergies   Allergen Reactions    Penicillins Hives       Objective :  Temp:  [97.6 °F (36.4 °C)-98.1 °F (36.7 °C)] 98.1 °F (36.7 °C)  HR:  [] 98  BP: ()/(50-84) 152/83  Resp:  [24-28] 26  SpO2:  [91 %-99 %] 92 %  O2 Device: None (Room air)    Physical Exam  Constitutional:       Appearance: She is ill-appearing (chronically ill appearing, frail and cachectic.).   HENT:      Head: Normocephalic.      Nose: Nose normal.      Mouth/Throat:      Mouth: Mucous membranes are moist.   Eyes:      General: No scleral icterus.        Right eye: No discharge.         Left eye: No discharge.      Pupils: Pupils are equal, round, and reactive to light.   Cardiovascular:      Rate and Rhythm: Tachycardia present.   Pulmonary:      Effort: Respiratory distress present.      Breath sounds: No stridor. No wheezing, rhonchi or rales.   Chest:      Chest wall: No tenderness.   Abdominal:      General: Abdomen is flat. There is no distension.      Palpations: There is no mass.      Tenderness: There is abdominal tenderness. There is no right CVA tenderness, left CVA tenderness, guarding or rebound.      Hernia: No hernia is present.   Musculoskeletal:      Right lower leg: No edema.      Left lower leg: No edema.    Skin:     Coloration: Skin is pale. Skin is not jaundiced.      Findings: No bruising, erythema or lesion.   Neurological:      General: No focal deficit present.      Cranial Nerves: No cranial nerve deficit.      Sensory: No sensory deficit.      Motor: No weakness.      Coordination: Coordination normal.      Gait: Gait normal.      Deep Tendon Reflexes: Reflexes normal.      Comments: Not answering any questions            Lines/Drains:  Lines/Drains/Airways       Active Status       Name Placement date Placement time Site Days    Urethral Catheter Straight-tip 16 Fr. 12/10/24  1500  -- 20                  Urinary Catheter:  Goal for removal:  keep carcamo .                Lab Results: I have reviewed the following results:  Results from last 7 days   Lab Units 12/30/24  1232   WBC Thousand/uL 5.10   HEMOGLOBIN g/dL 8.5*   HEMATOCRIT % 27.6*   PLATELETS Thousands/uL 295   SEGS PCT % 71   LYMPHO PCT % 20   MONO PCT % 7   EOS PCT % 0     Results from last 7 days   Lab Units 12/30/24  1232   SODIUM mmol/L 142   POTASSIUM mmol/L 3.7   CHLORIDE mmol/L 100   CO2 mmol/L 24   BUN mg/dL 22   CREATININE mg/dL 0.55*   ANION GAP mmol/L 18*   CALCIUM mg/dL 7.4*   ALBUMIN g/dL 2.3*   TOTAL BILIRUBIN mg/dL 0.57   ALK PHOS U/L 51   ALT U/L 14   AST U/L 53*   GLUCOSE RANDOM mg/dL 148*         Results from last 7 days   Lab Units 12/30/24  1136   POC GLUCOSE mg/dl 125     Lab Results   Component Value Date    HGBA1C 6.1 (H) 10/02/2021    HGBA1C 6.1 (H) 09/29/2020    HGBA1C 5.9 (H) 08/08/2019     Results from last 7 days   Lab Units 12/30/24  1532   LACTIC ACID mmol/L 3.0*       Imaging Results Review: No pertinent imaging studies reviewed.  Other Study Results Review: No additional pertinent studies reviewed.    Administrative Statements   I have spent a total time of 80 minutes in caring for this patient on the day of the visit/encounter including Diagnostic results, Prognosis, Risks and benefits of tx options, Instructions for  management, Patient and family education, Importance of tx compliance, Risk factor reductions, Impressions, Counseling / Coordination of care, Documenting in the medical record, Reviewing / ordering tests, medicine, procedures  , Obtaining or reviewing history  , and Communicating with other healthcare professionals .    ** Please Note: This note has been constructed using a voice recognition system. **

## 2024-12-30 NOTE — ED PROVIDER NOTES
Time reflects when diagnosis was documented in both MDM as applicable and the Disposition within this note       Time User Action Codes Description Comment    12/30/2024  1:58 PM RiversideChely Add [N39.0] UTI (urinary tract infection)     12/30/2024  1:58 PM [place], Chely Remove [N39.0] UTI (urinary tract infection)     12/30/2024  1:58 PM Riverside, Chely Add [T83.511A,  N39.0] Urinary tract infection associated with indwelling urethral catheter, initial encounter  ([facility])     12/30/2024  1:58 PM RiversideLorie gutierrezen Add [B34.2] Coronavirus infection     12/30/2024  1:59 PM [place], Chely Add [R79.89] Elevated troponin I level     12/30/2024  1:59 PM Chely Goldberg Add [I48.91] Atrial fibrillation with RVR ([facility])           ED Disposition       ED Disposition   Admit    Condition   Stable    Date/Time   Mon Dec 30, 2024  1:58 PM    Comment   Case was discussed with CELESTE and the patient's admission status was agreed to be Admission Status: inpatient status to the service of Dr. Dick .               Assessment & Plan       Medical Decision Making  Patient is a 89 y.o. year-old female w/ hx of Afib on eliquis, dementia, recent hospitalization p/w decreased PO intake in s/o cough with positive sick contacts.  Granddaughter and MPOA states that everyone at home has had an acute viral syndrome and has tested negative for RSV and flu but are pending COVID testing.  Patient has been in her usual state of health until today when she developed a junky cough with associated decreased p.o. intake with worsening of her delirium that is baseline.  Granddaughter denies any other associated symptoms and states that patient has been adherent to her home Eliquis.    Vitals with tachycardia and borderline hypotensive with tachypnea but in no acute distress on examination.  Oriented to self only which is baseline per granddaughter.  Has numerous small skin tears to the bilateral upper extremities with a stage IV sacral decubitus ulcer that does not appear to be  acutely infected.      Differential diagnosis includes but is not limited to: Viral syndrome, pneumonia, JUSTINA, A-fib with RVR, ACS, aspiration pneumonitis, UTI.  I have a lower clinical suspicion for sepsis given patient is normothermic and at baseline mentation.    Workup and treatment as below:    Imaging: See ED course  EKG: See ED Course  Labs: See ED course  Meds: IV fluids, antibiotics with coverage based on past sensitivities  Consults: N/A  Reassessment: Vital signs improved after IV fluids    Dispo: Patient admitted to [facility] on telemetry      Amount and/or Complexity of Data Reviewed  Labs: ordered. Decision-making details documented in ED Course.  Radiology: ordered.    Risk  Prescription drug management.  Decision regarding hospitalization.        ED Course as of 12/30/24 1359   Mon Dec 30, 2024   1137 POC Glucose: 125   1242 SARS COV Rapid Antigen(!): Positive   1249 pH, Checo(!): 7.433   1249 pCO2, Checo(!): 33.8   1249 pO2, Checo(!): 60.3   1249 HCO3, Checo(!): 22.1   1249 Base Excess, Checo: -1.8   1300 CBC and differential(!)  Stable anemia   1300 Chest x-ray unchanged from prior   1311 ANION GAP(!): 18   1316 hs TnI 0hr(!): 101   1333 TSH 3RD GENERATON(!): 10.852  New from prior   1346 Leukocytes, UA(!): Large   1346 Nitrite, UA(!): Positive  C/f UTI. Prior cultures susceptible to vanc and cephalosporins   1346 Ketones, UA(!): 10 (1+)       Medications   cefepime (MAXIPIME) 1,000 mg in dextrose 5 % 50 mL IVPB (has no administration in time range)   vancomycin (VANCOCIN) 1,250 mg in sodium chloride 0.9 % 250 mL IVPB (has no administration in time range)   multi-electrolyte (ISOLYTE-S PH 7.4) bolus 500 mL (0 mL Intravenous Stopped 12/30/24 1316)       ED Risk Strat Scores                                              History of Present Illness       Chief Complaint   Patient presents with    Failure To Thrive     Pt arrives via EMS from home with family member as her caretaker. EMS reports family is concerned for  pt due to lack of drinking/eating at home. EMS reports whole house has been sick. EMS reports blood sugar 117. History of dementia per EMS. Chronic carcamo.        Past Medical History:   Diagnosis Date    Atrial fibrillation (HCC)     Dementia (HCC)     Hypertension       No past surgical history on file.   No family history on file.   Social History     Tobacco Use    Smoking status: Never    Smokeless tobacco: Never   Substance Use Topics    Alcohol use: Never    Drug use: Never      E-Cigarette/Vaping      E-Cigarette/Vaping Substances      I have reviewed and agree with the history as documented.     89 y.o. year-old female w/ hx of Afib on eliquis, dementia, recent hospitalization p/w decreased PO intake in s/o cough with positive sick contacts.  Granddaughter and MPOA states that everyone at home has had an acute viral syndrome and has tested negative for RSV and flu but are pending COVID testing.  Patient has been in her usual state of health until today when she developed a junky cough with associated decreased p.o. intake with worsening of her delirium that is baseline.  Granddaughter denies any other associated symptoms and states that patient has been adherent to her home Eliquis.          Review of Systems   Unable to perform ROS: Dementia           Objective       ED Triage Vitals [12/30/24 1047]   Temperature Pulse Blood Pressure Respirations SpO2 Patient Position - Orthostatic VS   97.6 °F (36.4 °C) 62 96/68 (!) 28 99 % Lying      Temp Source Heart Rate Source BP Location FiO2 (%) Pain Score    Axillary Monitor Right arm -- --      Vitals      Date and Time Temp Pulse SpO2 Resp BP Pain Score FACES Pain Rating User   12/30/24 1330 -- 97 94 % 26 126/81 -- --    12/30/24 1300 -- 85 95 % -- 118/75 -- --    12/30/24 1236 -- 118 95 % -- 98/51 -- --    12/30/24 1200 -- 130 91 % -- 83/50 -- --    12/30/24 1149 98.1 °F (36.7 °C) 131 93 % 28 -- -- --    12/30/24 1100 --  179 97 % -- 101/72 -- --     12/30/24 1047 97.6 °F (36.4 °C) 62 99 % 28 96/68 -- -- AG            Physical Exam  Constitutional:       General: She is not in acute distress.     Appearance: Normal appearance. She is normal weight. She is not ill-appearing or toxic-appearing.      Comments: cachectic   HENT:      Head: Normocephalic.      Nose: Nose normal.      Mouth/Throat:      Mouth: Mucous membranes are dry.      Pharynx: Oropharynx is clear.   Eyes:      Conjunctiva/sclera: Conjunctivae normal.   Cardiovascular:      Rate and Rhythm: Tachycardia present. Rhythm irregular.      Heart sounds: Normal heart sounds.   Pulmonary:      Effort: Pulmonary effort is normal.      Breath sounds: Normal breath sounds.   Abdominal:      General: Abdomen is flat.      Palpations: Abdomen is soft.      Tenderness: There is no abdominal tenderness.   Genitourinary:     Comments: Chronic indwelling Chung present with dark urine in bag  Musculoskeletal:      Comments: Punctate skin tears to bilateral forearms without surrounding erythema/induration/drainage.     Stage IV sacral decub   Skin:     General: Skin is warm and dry.      Capillary Refill: Capillary refill takes less than 2 seconds.   Neurological:      Mental Status: She is alert. She is disoriented.      Comments: Oriented to self only, moving all four extremities but with chronic contracture of RUE.         Results Reviewed       Procedure Component Value Units Date/Time    Urine Microscopic [[id number]]  (Abnormal) Collected: 12/30/24 1325    Lab Status: Final result Specimen: Urine, Indwelling Chung Catheter Updated: 12/30/24 1351     RBC, UA 20-30 /hpf      WBC, UA Innumerable /hpf      Epithelial Cells Occasional /hpf      Bacteria, UA Innumerable /hpf      MUCUS THREADS Occasional     Hyaline Casts, UA 3-5 /lpf      WBC Clumps Present     Budding Yeast Present    Urine culture [[id number]] Collected: 12/30/24 1325    Lab Status: In process Specimen: Urine, Indwelling Chung Catheter Updated:  12/30/24 1351    UA w Reflex to Microscopic w Reflex to Culture [[id number]]  (Abnormal) Collected: 12/30/24 1325    Lab Status: Final result Specimen: Urine, Indwelling Chung Catheter Updated: 12/30/24 1341     Color, UA Yellow     Clarity, UA Turbid     Specific Gravity, UA 1.012     pH, UA 7.0     Leukocytes, UA Large     Nitrite, UA Positive     Protein, UA 50 (1+) mg/dl      Glucose, UA Negative mg/dl      Ketones, UA 10 (1+) mg/dl      Urobilinogen, UA <2.0 mg/dl      Bilirubin, UA Negative     Occult Blood, UA Small    TSH, 3rd generation with Free T4 reflex [[id number]]  (Abnormal) Collected: 12/30/24 1232    Lab Status: Final result Specimen: Blood from Arm, Left Updated: 12/30/24 1324     TSH 3RD GENERATON 10.852 uIU/mL     T4, free [[id number]] Collected: 12/30/24 1232    Lab Status: In process Specimen: Blood from Arm, Left Updated: 12/30/24 1324    HS Troponin 0hr (reflex protocol) [[id number]]  (Abnormal) Collected: 12/30/24 1232    Lab Status: Final result Specimen: Blood from Arm, Left Updated: 12/30/24 1315     hs TnI 0hr 101 ng/L     HS Troponin I 2hr [[id number]]     Lab Status: No result Specimen: Blood     Lipase [[id number]]  (Abnormal) Collected: 12/30/24 1232    Lab Status: Final result Specimen: Blood from Arm, Left Updated: 12/30/24 1308     Lipase 9 u/L     Comprehensive metabolic panel [[id number]]  (Abnormal) Collected: 12/30/24 1232    Lab Status: Final result Specimen: Blood from Arm, Left Updated: 12/30/24 1308     Sodium 142 mmol/L      Potassium 3.7 mmol/L      Chloride 100 mmol/L      CO2 24 mmol/L      ANION GAP 18 mmol/L      BUN 22 mg/dL      Creatinine 0.55 mg/dL      Glucose 148 mg/dL      Calcium 7.4 mg/dL      Corrected Calcium 8.8 mg/dL      AST 53 U/L      ALT 14 U/L      Alkaline Phosphatase 51 U/L      Total Protein 5.4 g/dL      Albumin 2.3 g/dL      Total Bilirubin 0.57 mg/dL      eGFR 77 ml/min/1.73sq m     Narrative:      National Kid[employer] Disease Foundation guidelines for  Chronic Kidney Disease (CKD):     Stage 1 with normal or high GFR (GFR > 90 mL/min/1.73 square meters)    Stage 2 Mild CKD (GFR = 60-89 mL/min/1.73 square meters)    Stage 3A Moderate CKD (GFR = 45-59 mL/min/1.73 square meters)    Stage 3B Moderate CKD (GFR = 30-44 mL/min/1.73 square meters)    Stage 4 Severe CKD (GFR = 15-29 mL/min/1.73 square meters)    Stage 5 End Stage CKD (GFR <15 mL/min/1.73 square meters)  Note: GFR calculation is accurate only with a steady state creatinine    Phosphorus [[id number]]  (Normal) Collected: 12/30/24 1232    Lab Status: Final result Specimen: Blood from Arm, Left Updated: 12/30/24 1308     Phosphorus 3.3 mg/dL     Magnesium [[id number]]  (Normal) Collected: 12/30/24 1232    Lab Status: Final result Specimen: Blood from Arm, Left Updated: 12/30/24 1308     Magnesium 2.1 mg/dL     Blood gas, venous [[id number]]  (Abnormal) Collected: 12/30/24 1232    Lab Status: Final result Specimen: Blood from Arm, Left Updated: 12/30/24 1248     pH, Checo 7.433     pCO2, [place] 33.8 mm Hg      pO2, [place] 60.3 mm Hg      HCO3, Checo 22.1 mmol/L      Base Excess, Checo -1.8 mmol/L      O2 Content, Checo 11.4 ml/dL      O2 HGB, VENOUS 86.5 %     CBC and differential [[id number]]  (Abnormal) Collected: 12/30/24 1232    Lab Status: Final result Specimen: Blood from Arm, Left Updated: 12/30/24 1243     WBC 5.10 Thousand/uL      RBC 2.66 Million/uL      Hemoglobin 8.5 g/dL      Hematocrit 27.6 %       fL      MCH 32.0 pg      MCHC 30.8 g/dL      RDW 18.8 %      MPV 9.8 fL      Platelets 295 Thousands/uL      nRBC 0 /100 WBCs      Segmented % 71 %      Immature Grans % 1 %      Lymphocytes % 20 %      Monocytes % 7 %      Eosinophils Relative 0 %      Basophils Relative 1 %      Absolute Neutrophils 3.65 Thousands/µL      Absolute Immature Grans 0.03 Thousand/uL      Absolute Lymphocytes 1.03 Thousands/µL      Absolute Monocytes 0.33 Thousand/µL      Eosinophils Absolute 0.02 Thousand/µL      Basophils Absolute  0.04 Thousands/µL     FLU/COVID Rapid Antigen (30 min. TAT) - Preferred screening test in ED [[id number]]  (Abnormal) Collected: 12/30/24 1152    Lab Status: Final result Specimen: Nares from Nose Updated: 12/30/24 1241     SARS COV Rapid Antigen Positive     Influenza A Rapid Antigen Negative     Influenza B Rapid Antigen Negative    Narrative:      This test has been performed using the [employer] Cathy 2 FLU+SARS Antigen test under the Emergency Use Authorization (EUA). This test has been validated by the  and verified by the performing laboratory. The Cathy uses lateral flow immunofluorescent sandwich assay to detect SARS-COV, Influenza A and Influenza B Antigen.     The [employer] Cathy 2 SARS Antigen test does not differentiate between SARS-CoV and SARS-CoV-2.     Negative results are presumptive and may be confirmed with a molecular assay, if necessary, for patient management. Negative results do not rule out SARS-CoV-2 or influenza infection and should not be used as the sole basis for treatment or patient management decisions. A negative test result may occur if the level of antigen in a sample is below the limit of detection of this test.     Positive results are indicative of the presence of viral antigens, but do not rule out bacterial infection or co-infection with other viruses.     All test results should be used as an adjunct to clinical observations and other information available to the provider.    FOR PEDIATRIC PATIENTS - copy/paste COVID Guidelines URL to browser: [email or web address]    Lactic acid, plasma (w/reflex if result > 2.0) [[id number]] Collected: 12/30/24 1232    Lab Status: No result Specimen: Blood from Arm, Left     Fingerstick Glucose (POCT) [[id number]]  (Normal) Collected: 12/30/24 1136    Lab Status: Final result Specimen: Blood Updated: 12/30/24 1136     POC Glucose 125 mg/dl             XR chest 1 view portable     (Results Pending)       CriticalCare Time    Date/Time: 12/30/2024 1:59 PM    Performed by: Chely Goldberg MD  Authorized by: Chely Goldberg MD    Critical care provider statement:     Critical care time (minutes):  30 (CC time was exclusive separate form any procedures or treating other patients.)    Critical care time was exclusive of:  Separately billable procedures and treating other patients and teaching time    Critical care was necessary to treat or prevent imminent or life-threatening deterioration of the following conditions:  Circulatory failure, shock and cardiac failure    Critical care was time spent personally by me on the following activities:  Obtaining history from patient or surrogate, development of treatment plan with patient or surrogate, evaluation of patient's response to treatment, examination of patient, ordering and performing treatments and interventions, ordering and review of laboratory studies, ordering and review of radiographic studies and re-evaluation of patient's condition      ED Medication and Procedure Management   Prior to Admission Medications   Prescriptions Last Dose Informant Patient Reported? Taking?   Melatonin 5 MG/15ML LIQD   Yes No   Sig: Take 5 mg by mouth daily at bedtime as needed (insomina)   acetaminophen (TYLENOL) 650 mg CR tablet   Yes No   Sig: Take 650 mg by mouth every 8 (eight) hours as needed for mild pain   apixaban (ELIQUIS) 5 mg   No No   Sig: Take 1 tablet (5 mg total) by mouth 2 (two) times a day   bisacodyl (Dulcolax) 10 mg suppository   Yes No   Sig: Insert 10 mg into the rectum daily as needed for constipation   collagenase (SANTYL) ointment   No No   Sig: Apply topically daily   Patient not taking: Reported on 12/14/2024   dorzolamide-timolol (COSOPT) 2-0.5 % ophthalmic solution   Yes No   Sig: Administer 1 drop to both eyes 2 (two) times a day   latanoprost (XALATAN) 0.005 % ophthalmic solution   Yes No   Sig: Administer 1 drop to both eyes daily    metoprolol tartrate (LOPRESSOR) 25 mg tablet   No No   Sig: Take 1 tablet (25 mg total) by mouth every 12 (twelve) hours   mineral oil enema   Yes No   Sig: Insert 1 enema into the rectum daily as needed for constipation   nystatin (MYCOSTATIN) powder   No No   Sig: Apply topically 2 (two) times a day   sodium phosphate 3 mmol/mL   Yes No   Sig: Insert 1 enema into the rectum daily as needed (constipation)      Facility-Administered Medications: None     Patient's Medications   Discharge Prescriptions    No medications on file     No discharge procedures on file.  ED SEPSIS DOCUMENTATION   Time reflects when diagnosis was documented in both MDM as applicable and the Disposition within this note       Time User Action Codes Description Comment    12/30/2024  1:58 PM Chely Goldberg [N39.0] UTI (urinary tract infection)     12/30/2024  1:58 PM Chely Goldberg Remove [N39.0] UTI (urinary tract infection)     12/30/2024  1:58 PM Chely Goldberg [T83.511A,  N39.0] Urinary tract infection associated with indwelling urethral catheter, initial encounter  ([facility])     12/30/2024  1:58 PM Chely Goldberg [B34.2] Coronavirus infection     12/30/2024  1:59 PM Chely Goldberg [R79.89] Elevated troponin I level     12/30/2024  1:59 PM Chely Goldberg [I48.91] Atrial fibrillation with RVR ([facility])                  Chely Goldberg MD  12/30/24 7751

## 2024-12-31 LAB
ALBUMIN SERPL BCG-MCNC: 2.2 G/DL (ref 3.5–5)
ALP SERPL-CCNC: 48 U/L (ref 34–104)
ALT SERPL W P-5'-P-CCNC: 16 U/L (ref 7–52)
ANION GAP SERPL CALCULATED.3IONS-SCNC: 7 MMOL/L (ref 4–13)
AST SERPL W P-5'-P-CCNC: 44 U/L (ref 13–39)
BASOPHILS # BLD AUTO: 0.03 THOUSANDS/ΜL (ref 0–0.1)
BASOPHILS NFR BLD AUTO: 1 % (ref 0–1)
BILIRUB SERPL-MCNC: 0.46 MG/DL (ref 0.2–1)
BUN SERPL-MCNC: 23 MG/DL (ref 5–25)
CALCIUM ALBUM COR SERPL-MCNC: 9.3 MG/DL (ref 8.3–10.1)
CALCIUM SERPL-MCNC: 7.9 MG/DL (ref 8.4–10.2)
CHLORIDE SERPL-SCNC: 103 MMOL/L (ref 96–108)
CO2 SERPL-SCNC: 34 MMOL/L (ref 21–32)
CREAT SERPL-MCNC: 0.53 MG/DL (ref 0.6–1.3)
EOSINOPHIL # BLD AUTO: 0.01 THOUSAND/ΜL (ref 0–0.61)
EOSINOPHIL NFR BLD AUTO: 0 % (ref 0–6)
ERYTHROCYTE [DISTWIDTH] IN BLOOD BY AUTOMATED COUNT: 18.6 % (ref 11.6–15.1)
GFR SERPL CREATININE-BSD FRML MDRD: 78 ML/MIN/1.73SQ M
GLUCOSE SERPL-MCNC: 109 MG/DL (ref 65–140)
HCT VFR BLD AUTO: 29 % (ref 34.8–46.1)
HGB BLD-MCNC: 9 G/DL (ref 11.5–15.4)
IMM GRANULOCYTES # BLD AUTO: 0.03 THOUSAND/UL (ref 0–0.2)
IMM GRANULOCYTES NFR BLD AUTO: 1 % (ref 0–2)
LYMPHOCYTES # BLD AUTO: 0.96 THOUSANDS/ΜL (ref 0.6–4.47)
LYMPHOCYTES NFR BLD AUTO: 17 % (ref 14–44)
MCH RBC QN AUTO: 31.5 PG (ref 26.8–34.3)
MCHC RBC AUTO-ENTMCNC: 31 G/DL (ref 31.4–37.4)
MCV RBC AUTO: 101 FL (ref 82–98)
MONOCYTES # BLD AUTO: 0.67 THOUSAND/ΜL (ref 0.17–1.22)
MONOCYTES NFR BLD AUTO: 12 % (ref 4–12)
NEUTROPHILS # BLD AUTO: 3.82 THOUSANDS/ΜL (ref 1.85–7.62)
NEUTS SEG NFR BLD AUTO: 69 % (ref 43–75)
NRBC BLD AUTO-RTO: 0 /100 WBCS
PLATELET # BLD AUTO: 290 THOUSANDS/UL (ref 149–390)
PMV BLD AUTO: 10 FL (ref 8.9–12.7)
POTASSIUM SERPL-SCNC: 2.7 MMOL/L (ref 3.5–5.3)
PROT SERPL-MCNC: 5.4 G/DL (ref 6.4–8.4)
RBC # BLD AUTO: 2.86 MILLION/UL (ref 3.81–5.12)
SODIUM SERPL-SCNC: 144 MMOL/L (ref 135–147)
VANCOMYCIN SERPL-MCNC: 11.1 UG/ML (ref 10–20)
WBC # BLD AUTO: 5.52 THOUSAND/UL (ref 4.31–10.16)

## 2024-12-31 PROCEDURE — 99232 SBSQ HOSP IP/OBS MODERATE 35: CPT | Performed by: INTERNAL MEDICINE

## 2024-12-31 PROCEDURE — 85025 COMPLETE CBC W/AUTO DIFF WBC: CPT | Performed by: NURSE PRACTITIONER

## 2024-12-31 PROCEDURE — 80053 COMPREHEN METABOLIC PANEL: CPT | Performed by: NURSE PRACTITIONER

## 2024-12-31 PROCEDURE — 05HY33Z INSERTION OF INFUSION DEVICE INTO UPPER VEIN, PERCUTANEOUS APPROACH: ICD-10-PCS | Performed by: INTERNAL MEDICINE

## 2024-12-31 PROCEDURE — 80202 ASSAY OF VANCOMYCIN: CPT | Performed by: INTERNAL MEDICINE

## 2024-12-31 PROCEDURE — 36415 COLL VENOUS BLD VENIPUNCTURE: CPT | Performed by: NURSE PRACTITIONER

## 2024-12-31 RX ORDER — POTASSIUM CHLORIDE 14.9 MG/ML
20 INJECTION INTRAVENOUS ONCE
Status: COMPLETED | OUTPATIENT
Start: 2024-12-31 | End: 2025-01-01

## 2024-12-31 RX ORDER — BISACODYL 5 MG/1
5 TABLET, DELAYED RELEASE ORAL
Status: DISCONTINUED | OUTPATIENT
Start: 2025-01-01 | End: 2025-01-02 | Stop reason: HOSPADM

## 2024-12-31 RX ORDER — POTASSIUM CHLORIDE 14.9 MG/ML
20 INJECTION INTRAVENOUS ONCE
Status: COMPLETED | OUTPATIENT
Start: 2024-12-31 | End: 2024-12-31

## 2024-12-31 RX ADMIN — CEFEPIME 1000 MG: 1 INJECTION, POWDER, FOR SOLUTION INTRAMUSCULAR; INTRAVENOUS at 01:35

## 2024-12-31 RX ADMIN — POTASSIUM CHLORIDE 20 MEQ: 14.9 INJECTION, SOLUTION INTRAVENOUS at 11:21

## 2024-12-31 RX ADMIN — DEXTROSE 1000 MG: 50 INJECTION, SOLUTION INTRAVENOUS at 17:20

## 2024-12-31 RX ADMIN — LATANOPROST 1 DROP: 50 SOLUTION OPHTHALMIC at 21:40

## 2024-12-31 RX ADMIN — METOPROLOL TARTRATE 25 MG: 25 TABLET, FILM COATED ORAL at 21:40

## 2024-12-31 RX ADMIN — DORZOLAMIDE HYDROCHLORIDE AND TIMOLOL MALEATE 1 DROP: 20; 5 SOLUTION OPHTHALMIC at 12:09

## 2024-12-31 RX ADMIN — VANCOMYCIN HYDROCHLORIDE 1000 MG: 5 INJECTION, POWDER, LYOPHILIZED, FOR SOLUTION INTRAVENOUS at 19:21

## 2024-12-31 RX ADMIN — BISACODYL 10 MG: 10 SUPPOSITORY RECTAL at 10:04

## 2024-12-31 RX ADMIN — POTASSIUM CHLORIDE 20 MEQ: 14.9 INJECTION, SOLUTION INTRAVENOUS at 21:40

## 2024-12-31 RX ADMIN — METOPROLOL TARTRATE 25 MG: 25 TABLET, FILM COATED ORAL at 10:04

## 2024-12-31 RX ADMIN — DORZOLAMIDE HYDROCHLORIDE AND TIMOLOL MALEATE 1 DROP: 20; 5 SOLUTION OPHTHALMIC at 17:30

## 2024-12-31 RX ADMIN — ACETAMINOPHEN 325 MG: 325 TABLET, FILM COATED ORAL at 10:03

## 2024-12-31 RX ADMIN — MINERAL OIL 1 ENEMA: 100 ENEMA RECTAL at 11:21

## 2024-12-31 RX ADMIN — APIXABAN 2.5 MG: 2.5 TABLET, FILM COATED ORAL at 10:03

## 2024-12-31 NOTE — PROGRESS NOTES
Progress Note - Hospitalist   Name: Grace Clayton 89 y.o. female I MRN: [id number]  Unit/Bed#: S -01 I Date of Admission: 12/30/2024   Date of Service: 12/31/2024 I Hospital Day: 1    Assessment & Plan  Sepsis (HCC)  Meeting criteria for severe sepsis given low blood pressure earlier this morning in the emergency department.  Also with lactic of 3  Source could be UTI versus COVID  Will give IV fluids  Got 500 mL in the ED, will give 500 more  Sinew antibiotics, trend procalcitonin    Background of sacral wound, will need wound care this is possibly also a source also has a history of constipation.  Get CT abdomen  For now follow up on wound recs for now   Wound does not look infected however reported OM on CT scan  COVID-19  High risk given age will start treatment  Continue remdesivir.   UTI (urinary tract infection)  UA positive for nitrates and leukocytes,  Continue antibiotics  Follow-up on cultures  Continue abx   Will consult ID   Ambulatory dysfunction  PT OT  Family will likely decline rehab.   Paroxysmal atrial fibrillation (HCC)  Heart rate in the 100s  Rate controlled and on anticoagulation  Essential hypertension  Blood pressure is 138/80  Elevated troponin  Likely in the setting of infection  Will trend will monitor on telemetry  Elevated TSH  Check T4- normal   No change to current meds      VTE Pharmacologic Prophylaxis:   Moderate Risk (Score 3-4) - Pharmacological DVT Prophylaxis Ordered: heparin.    Mobility:   Unable to assess mobility.     Patient Centered Rounds: I performed bedside rounds with nursing staff today.   Discussions with Specialists or Other Care Team Provider: discussed with nursing- they will give suppository and enema.     Education and Discussions with Family / Patient:  called mateus and updated her. .     Current Length of Stay: 1 day(s)  Current Patient Status: Inpatient   Certification Statement: The patient will continue to require additional inpatient hospital stay due  "to constipation and sepsis.   Discharge Plan: Anticipate discharge in 24-48 hrs to should likely go to facility but likely family will refuse.     Code Status: Level 3 - DNAR and DNI    Subjective   Patient seen and examined   \"Stop\" when I attempt to palpate abdomen  Making eye contact   No meaningful responses.     Objective :  Temp:  [97 °F (36.1 °C)-98.5 °F (36.9 °C)] 97 °F (36.1 °C)  HR:  [] 86  BP: ()/(50-99) 138/80  Resp:  [20-24] 20  SpO2:  [91 %-96 %] 96 %  O2 Device: Nasal cannula  Nasal Cannula O2 Flow Rate (L/min):  [3 L/min] 3 L/min    Body mass index is 19.44 kg/m².     Input and Output Summary (last 24 hours):     Intake/Output Summary (Last 24 hours) at 12/31/2024 1558  Last data filed at 12/31/2024 1336  Gross per 24 hour   Intake 750.05 ml   Output 400 ml   Net 350.05 ml       Physical Exam  Constitutional:       General: She is not in acute distress.     Appearance: She is ill-appearing (elderly, frail, cachectic.). She is not toxic-appearing.   HENT:      Head: Normocephalic.      Nose: Nose normal.      Mouth/Throat:      Mouth: Mucous membranes are moist.   Eyes:      Pupils: Pupils are equal, round, and reactive to light.   Cardiovascular:      Rate and Rhythm: Normal rate. Rhythm irregular.      Heart sounds: Murmur heard.   Pulmonary:      Effort: Pulmonary effort is normal. No respiratory distress.      Breath sounds: No stridor. No wheezing, rhonchi or rales.   Chest:      Chest wall: No tenderness.   Abdominal:      General: Abdomen is flat. There is no distension.      Palpations: There is no mass.      Tenderness: There is abdominal tenderness. There is no right CVA tenderness, left CVA tenderness or guarding.      Hernia: No hernia is present.   Skin:     Capillary Refill: Capillary refill takes less than 2 seconds.      Coloration: Skin is pale. Skin is not jaundiced.      Findings: No bruising or lesion.   Neurological:      Cranial Nerves: No cranial nerve deficit.      " "Sensory: No sensory deficit.   Psychiatric:      Comments: Repeating the same words again and again   \"No \"  \"Help me\"           Lines/Drains:  Lines/Drains/Airways       Active Status       Name Placement date Placement time Site Days    Urethral Catheter Straight-tip 16 Fr. 12/10/24  1500  -- 21                  Urinary Catheter:  Goal for removal:  keep in.           Telemetry:  Telemetry Orders (From admission, onward)               24 Hour Telemetry Monitoring  Continuous x 24 Hours (Telem)        Expiring   Question:  Reason for 24 Hour Telemetry  Answer:  Metabolic/electrolyte disturbance with high probability of dysrhythmia. K level <3 or >6 OR KCL infusion >10mEq/hr                     Telemetry Reviewed: Atrial fibrillation. HR averaging 110  Indication for Continued Telemetry Use: Arrthymias requiring medical therapy               Lab Results: I have reviewed the following results:   Results from last 7 days   Lab Units 12/31/24  0535   WBC Thousand/uL 5.52   HEMOGLOBIN g/dL 9.0*   HEMATOCRIT % 29.0*   PLATELETS Thousands/uL 290   SEGS PCT % 69   LYMPHO PCT % 17   MONO PCT % 12   EOS PCT % 0     Results from last 7 days   Lab Units 12/31/24  0535   SODIUM mmol/L 144   POTASSIUM mmol/L 2.7*   CHLORIDE mmol/L 103   CO2 mmol/L 34*   BUN mg/dL 23   CREATININE mg/dL 0.53*   ANION GAP mmol/L 7   CALCIUM mg/dL 7.9*   ALBUMIN g/dL 2.2*   TOTAL BILIRUBIN mg/dL 0.46   ALK PHOS U/L 48   ALT U/L 16   AST U/L 44*   GLUCOSE RANDOM mg/dL 109         Results from last 7 days   Lab Units 12/30/24  1136   POC GLUCOSE mg/dl 125         Results from last 7 days   Lab Units 12/30/24  1826 12/30/24  1532   LACTIC ACID mmol/L 1.8 3.0*       Recent Cultures (last 7 days):   Results from last 7 days   Lab Units 12/30/24  1325   URINE CULTURE  >100,000 cfu/ml Gram Negative Segundo Enteric Like*  >100,000 cfu/ml Enterococcus species*       Imaging Results Review: No pertinent imaging studies reviewed.  Other Study Results Review: No " additional pertinent studies reviewed.    Last 24 Hours Medication List:     Current Facility-Administered Medications:     acetaminophen (TYLENOL) tablet 325 mg, Q6H ANY    apixaban (ELIQUIS) tablet 2.5 mg, BID    [START ON 1/1/2025] bisacodyl (DULCOLAX) EC tablet 5 mg, 0200    bisacodyl (DULCOLAX) rectal suppository 10 mg, Daily PRN    cefepime (MAXIPIME) 1,000 mg in dextrose 5 % 50 mL IVPB, Q12H, Last Rate: Stopped (12/31/24 0425)    dorzolamide-timolol (COSOPT) 2-0.5 % ophthalmic solution 1 drop, BID    latanoprost (XALATAN) 0.005 % ophthalmic solution 1 drop, HS    metoprolol tartrate (LOPRESSOR) tablet 25 mg, Q12H ANY    potassium chloride 20 mEq IVPB (premix), Once    vancomycin (VANCOCIN) 1000 mg in sodium chloride 0.9% 250 mL IVPB, Q24H    Administrative Statements   Today, Patient Was Seen By: Eli Dick MD  I have spent a total time of 30 minutes in caring for this patient on the day of the visit/encounter including Diagnostic results, Prognosis, Risks and benefits of tx options, Instructions for management, Patient and family education, Importance of tx compliance, Risk factor reductions, Impressions, Counseling / Coordination of care, Documenting in the medical record, Reviewing / ordering tests, medicine, procedures  , Obtaining or reviewing history  , and Communicating with other healthcare professionals .    **Please Note: This note may have been constructed using a voice recognition system.**

## 2024-12-31 NOTE — DISCHARGE INSTR - OTHER ORDERS
Skin care Plan:  1-Sacrum- Cleanse with NSS, pat dry. Apply Mesalt sheet over wound bed, fluff into area of undermining and cover with Silicone bordered foam. Nacho with T for treatment. Change daily and PRN.   2-B/L breast and groin- Cleanse skin and wounds with soap and water, pat dry. Apply dusting of Nystatin powder 2 times a day as ordered.  3-Posterior left leg wounds- Cleanse wounds with NSS, pat dry. Apply adaptic over wound beds, cover with 4x4, wrap with Atilio. Change every other day and as needed for soilage/displacement.   4-Moisturize skin daily with skin nourishing cream  5-Ehob cushion in chair when out of bed.  6-Hydraguard to bilateral buttock and heels BID and PRN.   7-Turn/reposition q2h for pressure re-distribution on skin.  8-Elevate heels to offload pressure  9-Utilize Tortoise Repositioning System and air support mattress overlay for med surg bed.

## 2024-12-31 NOTE — ASSESSMENT & PLAN NOTE
UA positive for nitrates and leukocytes,  Continue antibiotics  Follow-up on cultures  Continue abx   Will consult ID

## 2024-12-31 NOTE — WOUND OSTOMY CARE
Consult Note - Wound   Grace Clayton 89 y.o. female MRN: [id number]  Unit/Bed#: ED-08 Encounter: [id number]        History and Present Illness:  Patient is a 89 yo female that was admitted to [facility]  for treatment of sepsis. Patient has a PMH of afib , dementia with agitation, sacral wound, GERD, hypertension, hyperlipidemia, anxiety. Patient is not alert and oriented, and does not follow commands. Patient is assist of two for turning and repositioning, dependent for care.  Patient is incontinent of bowel  and has indwelling catheter in place for urine management. On assessment, patient is in emergency room, on stretcher, with pillows and wedges in place for offloading. Primary nurse at bedside for assessment.       Wound Care was consulted for multiple wounds. Patient with VNA and has specialty mattress at home.     Assessment Findings:   B/L heels are dry intact and artemio with no skin loss or wounds present. Recommend preventative Hydraguard Cream and proper offloading/ repositioning.        POA Sacrum Stage 4 Pressure Injury -  oval/irregular  shaped full thickness wound. Wound bed is 10 % exposed bone, 10% yellow moist slough, 80% pink/beefy red tissue. Undermining noted from 12 to 5 o'clock with greatest depth of 3 cm at approximately 4 o'clock. Small to moderate amount of mixed serosanguinous and bloody drainage. Leonor wound is hyperpigmented and fragile with rolled edges. Recommend Mesalt sheet to fluff into area of undermining; covered with silicone foam bordered dressing.     Bilateral breast MASD/ITD - Wounds are oval with irregular border, partial thickness, 100% pink tissue, with small amount of tan and serous drainage noted. Leonor-wounds are fragile, with an irregular red rash, suspect fungal component. Recommend Nystatin powder.     Bilateral groin - Skin is dry, intact, with red irregular rash. Recommend Nystatin powder      B/L Arm Skin Tears- Wound is irregular in shape, partial thickness, 100% pink  tissue, with scant amount of serosanguineous drainage noted. Leonor-wound is dry, intact, no redness.      Left anterior tibia- Wound is irregular in shape, partial thickness, 100% pink tissue, with scant amount of serosanguineous drainage noted. Leonor-wound is dry, intact, no redness.     No induration, fluctuance, odor, warmth/temperature differences, redness, or purulence noted to the above noted wounds and skin areas assessed. New dressings applied per orders listed below. Patient tolerated well- no s/s of non-verbal pain or discomfort observed during the encounter. Bedside nurse aware of plan of care. See flow sheets for more detailed assessment findings.      Orders listed below and wound care will continue to follow, call or Secure Chat with questions.     Skin care plans:  1-Sacrum- Cleanse with NSS, pat dry. Apply Mesalt sheet over wound bed, fluff into area of undermining and cover with Silicone bordered foam. Nacho with T for treatment. Change daily and PRN.   2-B/L breast and groin- Cleanse skin and wounds with soap and water, pat dry. Apply dusting of Nystatin powder 2 times a day as ordered.   3-B/L arms and Posterior left leg wounds - Cleanse wounds with NSS, pat dry. Apply adaptic over wound beds, cover with 4x4, wrap with Atilio. Change every other day and as needed for soilage/displacement.   4-Moisturize skin daily with skin nourishing cream   5-Ehob cushion in chair when out of bed.   6-Hydraguard to bilateral buttock and heels BID and PRN.   7-Turn/reposition q2h for pressure re-distribution on skin.   8-Elevate heels to offload pressure   9-Utilize Tortoise Repositioning System and air support mattress overlay for med surg bed.     Wounds:  Wound 10/27/24 Pressure Injury Sacrum Medial (Active)   Wound Image   12/31/24 1023   Wound Description Beefy red;Bleeding;Fragile;Drainage;Rolled edges 12/31/24 1023   Pressure Injury Stage 4 12/31/24 1023   Leonor-wound Assessment Fragile;Hyperpigmented 12/31/24  1023   Wound Length (cm) 8 cm 12/31/24 1023   Wound Width (cm) 6 cm 12/31/24 1023   Wound Depth (cm) 2 cm 12/31/24 1023   Wound Surface Area (cm^2) 48 cm^2 12/31/24 1023   Wound Volume (cm^3) 96 cm^3 12/31/24 1023   Calculated Wound Volume (cm^3) 96 cm^3 12/31/24 1023   Change in Wound Size % -14.29 12/31/24 1023   Number of underminings 1 12/31/24 1023   Undermining 1 3 12/31/24 1023   Undermining 1 is depth extending from 12 to 5 12/31/24 1023   Drainage Amount Small 12/31/24 1023   Drainage Description Bloody;Serosanguineous 12/31/24 1023   Non-staged Wound Description Full thickness 12/31/24 1023   Treatments Cleansed;Irrigation with NSS;Site care;Elevated 12/31/24 1023   Dressing Mesalt;Foam, Silicon (eg. Allevyn, etc) 12/31/24 1023   Wound packed? Yes 12/31/24 1023   Packing- # removed 0 12/31/24 1023   Packing- # inserted 1 12/31/24 1023   Dressing Changed New 12/31/24 1023   Patient Tolerance Tolerated well 12/31/24 1023   Dressing Status Clean;Dry;Intact 12/31/24 1023       Wound 10/29/24 Arm Left;Lower (Active)       Wound 12/12/24 Arm Anterior;Right;Lower (Active)       Wound 12/31/24 Tibial Left;Posterior;Proximal (Active)   Wound Image   12/31/24 1031   Wound Description Beefy red;Fragile 12/31/24 1031   Leonor-wound Assessment Hyperpigmented;Fragile 12/31/24 1031   Wound Length (cm) 2 cm 12/31/24 1031   Wound Width (cm) 1 cm 12/31/24 1031   Wound Depth (cm) 0.13 cm 12/31/24 1031   Wound Surface Area (cm^2) 2 cm^2 12/31/24 1031   Wound Volume (cm^3) 0.26 cm^3 12/31/24 1031   Calculated Wound Volume (cm^3) 0.26 cm^3 12/31/24 1031   Drainage Amount Small 12/31/24 1031   Drainage Description Serous 12/31/24 1031   Non-staged Wound Description Not applicable 12/31/24 1031   Treatments Cleansed;Site care 12/31/24 1031   Dressing Non adherent;Gauze;Dry dressing 12/31/24 1031   Wound packed? No 12/31/24 1031   Packing- # removed 0 12/31/24 1031   Packing- # inserted 0 12/31/24 1031   Dressing Changed New  12/31/24 1031   Patient Tolerance Tolerated well 12/31/24 1031   Dressing Status Clean;Dry;Intact 12/31/24 1031               Dionna Valera RN, BSN, CCRN

## 2024-12-31 NOTE — ED NOTES
Wound care at bedside with new dressing placed on stage 4 sacral wound, also found a new skin tear on LLL and L upper arm with dressings placed.   While giving suppository found a large amt of stool at rectum with a large amt of impacted stool. An SSE was used as well as manually disimpacting the stool with a moderate amt of hard stool balls removed, after the SSE the Min[employer] oil fleets was given.  Pt cleaned up and is repositioned on her L side with the sacral wound protected from contamination.     Betty Gonzalez RN  12/31/24 0877

## 2024-12-31 NOTE — ASSESSMENT & PLAN NOTE
Meeting criteria for severe sepsis given low blood pressure earlier this morning in the emergency department.  Also with lactic of 3  Source could be UTI versus COVID  Will give IV fluids  Got 500 mL in the ED, will give 500 more  Sinew antibiotics, trend procalcitonin    Background of sacral wound, will need wound care this is possibly also a source also has a history of constipation.  Get CT abdomen  For now follow up on wound recs for now   Wound does not look infected however reported OM on CT scan

## 2024-12-31 NOTE — PROGRESS NOTES
Grace Clayton is a 89 y.o. female who is currently ordered Vancomycin IV with management by the Pharmacy Consult service.  Relevant clinical data and objective / subjective history reviewed.  Vancomycin Assessment:  Indication and Goal AUC/Trough: Soft tissue (goal -600, trough >10)  Clinical Status:  new start  Micro:     Renal Function:  SCr: 0.55 mg/dL  CrCl: 41.5 mL/min  Renal replacement: Not on dialysis  Days of Therapy: 1  Current Dose: 1250 mg IV x 1 load  Vancomycin Plan:  New Dosin mg IV q24h  Estimated AUC: 521 mcg*hr/mL  Estimated Trough: 13.0 mcg/mL  Next Level:  @ 0900  Renal Function Monitoring: Daily BMP and UOP  Pharmacy will continue to follow closely for s/sx of nephrotoxicity, infusion reactions and appropriateness of therapy.  BMP and CBC will be ordered per protocol. We will continue to follow the patient’s culture results and clinical progress daily.    Xena Penny, Pharmacist

## 2024-12-31 NOTE — PROCEDURES
Venous Access Line Insertion    Date/Time: 12/31/2024 4:23 PM    Performed by: Eleanor Lucio RN  Authorized by: Eli Dick MD    Patient location:  Bedside  [place] protocol:     Procedure explained and questions answered to patient or proxy's satisfaction: yes    Pre-procedure details:     Hand hygiene: Hand hygiene performed prior to insertion      Sterile barrier technique: All elements of maximal sterile technique followed      Skin preparation:  ChloraPrep    Skin preparation agent: Skin preparation agent completely dried prior to procedure    Procedure details:     Complex Venous Access Line Type: Midline      Complex Venous Access Line Indications: new site      Orientation:  Right and upper    Location:  Brachial    Catheter size:  18 gauge    Total catheter length (cm):  10    Catheter out on skin (cm):  0    Max flow rate:  999ml/hr    Patient evaluated for contraindications to access (i.e. fistula, thrombosis, etc): Yes      Approach: percutaneous technique used      Sterile ultrasound techniques: Sterile gel and sterile probe covers were used      Number of attempts:  2    Successful placement: yes      Landmarks identified: yes    Anesthesia (see MAR for exact dosages):     Anesthesia method:  None  Post-procedure details:     Post-procedure:  Dressing applied and securement device placed    Assessment:  Blood return through all ports    Post-procedure complications: none      Patient tolerance of procedure:  Tolerated well, no immediate complications

## 2024-12-31 NOTE — ED NOTES
Pt took 1 spoonful of her meds in applesause then proceeded to spit the rest back at me.   Appears to comfortable at this time.     Betty Gonzalez RN  12/31/24 [id number]

## 2024-12-31 NOTE — PROGRESS NOTES
Grace Clayton is a 89 y.o. female who is currently ordered Vancomycin IV with management by the Pharmacy Consult service.  Relevant clinical data and objective / subjective history reviewed.  Vancomycin Assessment:  Indication and Goal AUC/Trough: Soft tissue (goal -600, trough >10)  Clinical Status: stable  Micro:     Renal Function:  SCr: 0.53 mg/dL  CrCl: 44 mL/min  Renal replacement: Not on dialysis  Days of Therapy: 2  Current Dose: 1000 mg IV every 24 hours  Vancomycin Plan:  New Dosing: continue current dosing  Estimated AUC: 555 mcg*hr/mL  Estimated Trough: 13.9 mcg/mL  Next Level: 1/7 @ 0600  Renal Function Monitoring: Daily BMP and UOP  Pharmacy will continue to follow closely for s/sx of nephrotoxicity, infusion reactions and appropriateness of therapy.  BMP and CBC will be ordered per protocol. We will continue to follow the patient’s culture results and clinical progress daily.    Hawa Voss, Pharmacist

## 2024-12-31 NOTE — QUICK NOTE
Discussed with ID - 3 days to treat UTI (to cover enterococcus with vancomycin and ecoli with rocephin) and then continue covid treatment. No formal consult placed.

## 2024-12-31 NOTE — PLAN OF CARE
Problem: Prexisting or High Potential for Compromised Skin Integrity  Goal: Skin integrity is maintained or improved  Description: INTERVENTIONS:  - Identify patients at risk for skin breakdown  - Assess and monitor skin integrity  - Assess and monitor nutrition and hydration status  - Monitor labs   - Assess for incontinence   - Turn and reposition patient  - Assist with mobility/ambulation  - Relieve pressure over bony prominences  - Avoid friction and shearing  - Provide appropriate hygiene as needed including keeping skin clean and dry  - Evaluate need for skin moisturizer/barrier cream  - Collaborate with interdisciplinary team   - Patient/family teaching  - Consider wound care consult   Outcome: Progressing     Problem: Potential for Falls  Goal: Patient will remain free of falls  Description: INTERVENTIONS:  - Educate patient/family on patient safety including physical limitations  - Instruct patient to call for assistance with activity   - Consult OT/PT to assist with strengthening/mobility   - Keep Call bell within reach  - Keep bed low and locked with side rails adjusted as appropriate  - Keep care items and personal belongings within reach  - Initiate and maintain comfort rounds  - Make Fall Risk Sign visible to staff  - Offer Toileting every 2 Hours, in advance of need  - Initiate/Maintain bed and chair alarm  - Obtain necessary fall risk management equipment:   - Apply yellow socks and bracelet for high fall risk patients  - Consider moving patient to room near nurses station  Outcome: Progressing

## 2025-01-01 LAB
ABO GROUP BLD: NORMAL
ALBUMIN SERPL BCG-MCNC: 2.1 G/DL (ref 3.5–5)
ALP SERPL-CCNC: 45 U/L (ref 34–104)
ALT SERPL W P-5'-P-CCNC: 13 U/L (ref 7–52)
ANION GAP SERPL CALCULATED.3IONS-SCNC: 7 MMOL/L (ref 4–13)
ANISOCYTOSIS BLD QL SMEAR: PRESENT
AST SERPL W P-5'-P-CCNC: 34 U/L (ref 13–39)
BASOPHILS # BLD AUTO: 0.01 THOUSANDS/ΜL (ref 0–0.1)
BASOPHILS NFR BLD AUTO: 0 % (ref 0–1)
BILIRUB SERPL-MCNC: 0.39 MG/DL (ref 0.2–1)
BLD GP AB SCN SERPL QL: NEGATIVE
BUN SERPL-MCNC: 20 MG/DL (ref 5–25)
CALCIUM ALBUM COR SERPL-MCNC: 9.2 MG/DL (ref 8.3–10.1)
CALCIUM SERPL-MCNC: 7.7 MG/DL (ref 8.4–10.2)
CHLORIDE SERPL-SCNC: 106 MMOL/L (ref 96–108)
CO2 SERPL-SCNC: 32 MMOL/L (ref 21–32)
CREAT SERPL-MCNC: 0.56 MG/DL (ref 0.6–1.3)
EOSINOPHIL # BLD AUTO: 0.01 THOUSAND/ΜL (ref 0–0.61)
EOSINOPHIL NFR BLD AUTO: 0 % (ref 0–6)
ERYTHROCYTE [DISTWIDTH] IN BLOOD BY AUTOMATED COUNT: 18.9 % (ref 11.6–15.1)
GFR SERPL CREATININE-BSD FRML MDRD: 77 ML/MIN/1.73SQ M
GLUCOSE SERPL-MCNC: 100 MG/DL (ref 65–140)
HCT VFR BLD AUTO: 20.9 % (ref 34.8–46.1)
HCT VFR BLD AUTO: 25.8 % (ref 34.8–46.1)
HGB BLD-MCNC: 6.4 G/DL (ref 11.5–15.4)
HGB BLD-MCNC: 7.9 G/DL (ref 11.5–15.4)
IMM GRANULOCYTES # BLD AUTO: 0.01 THOUSAND/UL (ref 0–0.2)
IMM GRANULOCYTES NFR BLD AUTO: 0 % (ref 0–2)
LYMPHOCYTES # BLD AUTO: 0.82 THOUSANDS/ΜL (ref 0.6–4.47)
LYMPHOCYTES NFR BLD AUTO: 22 % (ref 14–44)
MCH RBC QN AUTO: 32 PG (ref 26.8–34.3)
MCHC RBC AUTO-ENTMCNC: 30.6 G/DL (ref 31.4–37.4)
MCV RBC AUTO: 105 FL (ref 82–98)
MONOCYTES # BLD AUTO: 0.28 THOUSAND/ΜL (ref 0.17–1.22)
MONOCYTES NFR BLD AUTO: 8 % (ref 4–12)
NEUTROPHILS # BLD AUTO: 2.55 THOUSANDS/ΜL (ref 1.85–7.62)
NEUTS SEG NFR BLD AUTO: 70 % (ref 43–75)
NRBC BLD AUTO-RTO: 0 /100 WBCS
PLATELET # BLD AUTO: 203 THOUSANDS/UL (ref 149–390)
PLATELET BLD QL SMEAR: ADEQUATE
PMV BLD AUTO: 9.9 FL (ref 8.9–12.7)
POTASSIUM SERPL-SCNC: 2.9 MMOL/L (ref 3.5–5.3)
PROT SERPL-MCNC: 5.1 G/DL (ref 6.4–8.4)
RBC # BLD AUTO: 2 MILLION/UL (ref 3.81–5.12)
RBC MORPH BLD: PRESENT
RH BLD: NEGATIVE
SODIUM SERPL-SCNC: 145 MMOL/L (ref 135–147)
SPECIMEN EXPIRATION DATE: NORMAL
WBC # BLD AUTO: 3.68 THOUSAND/UL (ref 4.31–10.16)

## 2025-01-01 PROCEDURE — 80053 COMPREHEN METABOLIC PANEL: CPT | Performed by: INTERNAL MEDICINE

## 2025-01-01 PROCEDURE — 99232 SBSQ HOSP IP/OBS MODERATE 35: CPT | Performed by: INTERNAL MEDICINE

## 2025-01-01 PROCEDURE — 85025 COMPLETE CBC W/AUTO DIFF WBC: CPT | Performed by: INTERNAL MEDICINE

## 2025-01-01 PROCEDURE — 86901 BLOOD TYPING SEROLOGIC RH(D): CPT | Performed by: INTERNAL MEDICINE

## 2025-01-01 PROCEDURE — 86900 BLOOD TYPING SEROLOGIC ABO: CPT | Performed by: INTERNAL MEDICINE

## 2025-01-01 PROCEDURE — 85014 HEMATOCRIT: CPT | Performed by: INTERNAL MEDICINE

## 2025-01-01 PROCEDURE — 85018 HEMOGLOBIN: CPT | Performed by: INTERNAL MEDICINE

## 2025-01-01 PROCEDURE — 86850 RBC ANTIBODY SCREEN: CPT | Performed by: INTERNAL MEDICINE

## 2025-01-01 RX ORDER — POTASSIUM CHLORIDE 14.9 MG/ML
20 INJECTION INTRAVENOUS
Status: COMPLETED | OUTPATIENT
Start: 2025-01-01 | End: 2025-01-02

## 2025-01-01 RX ORDER — POTASSIUM CHLORIDE 14.9 MG/ML
20 INJECTION INTRAVENOUS
Status: COMPLETED | OUTPATIENT
Start: 2025-01-01 | End: 2025-01-01

## 2025-01-01 RX ORDER — ACETAMINOPHEN 10 MG/ML
1000 INJECTION, SOLUTION INTRAVENOUS EVERY 8 HOURS PRN
Status: DISCONTINUED | OUTPATIENT
Start: 2025-01-01 | End: 2025-01-01

## 2025-01-01 RX ADMIN — POTASSIUM CHLORIDE 20 MEQ: 14.9 INJECTION, SOLUTION INTRAVENOUS at 20:17

## 2025-01-01 RX ADMIN — POTASSIUM CHLORIDE 20 MEQ: 14.9 INJECTION, SOLUTION INTRAVENOUS at 23:49

## 2025-01-01 RX ADMIN — POTASSIUM CHLORIDE 20 MEQ: 14.9 INJECTION, SOLUTION INTRAVENOUS at 14:12

## 2025-01-01 RX ADMIN — POTASSIUM CHLORIDE 20 MEQ: 14.9 INJECTION, SOLUTION INTRAVENOUS at 13:07

## 2025-01-01 RX ADMIN — VANCOMYCIN HYDROCHLORIDE 1000 MG: 5 INJECTION, POWDER, LYOPHILIZED, FOR SOLUTION INTRAVENOUS at 15:10

## 2025-01-01 RX ADMIN — DORZOLAMIDE HYDROCHLORIDE AND TIMOLOL MALEATE 1 DROP: 20; 5 SOLUTION OPHTHALMIC at 17:48

## 2025-01-01 RX ADMIN — CEFEPIME 1000 MG: 1 INJECTION, POWDER, FOR SOLUTION INTRAMUSCULAR; INTRAVENOUS at 14:12

## 2025-01-01 RX ADMIN — LATANOPROST 1 DROP: 50 SOLUTION OPHTHALMIC at 23:50

## 2025-01-01 RX ADMIN — DORZOLAMIDE HYDROCHLORIDE AND TIMOLOL MALEATE 1 DROP: 20; 5 SOLUTION OPHTHALMIC at 10:35

## 2025-01-01 RX ADMIN — APIXABAN 2.5 MG: 2.5 TABLET, FILM COATED ORAL at 17:48

## 2025-01-01 NOTE — PLAN OF CARE
Problem: Prexisting or High Potential for Compromised Skin Integrity  Goal: Skin integrity is maintained or improved  Description: INTERVENTIONS:  - Identify patients at risk for skin breakdown  - Assess and monitor skin integrity  - Assess and monitor nutrition and hydration status  - Monitor labs   - Assess for incontinence   - Turn and reposition patient  - Assist with mobility/ambulation  - Relieve pressure over bony prominences  - Avoid friction and shearing  - Provide appropriate hygiene as needed including keeping skin clean and dry  - Evaluate need for skin moisturizer/barrier cream  - Collaborate with interdisciplinary team   - Patient/family teaching  - Consider wound care consult   Outcome: Progressing     Problem: Potential for Falls  Goal: Patient will remain free of falls  Description: INTERVENTIONS:  - Educate patient/family on patient safety including physical limitations  - Instruct patient to call for assistance with activity   - Consult OT/PT to assist with strengthening/mobility   - Keep Call bell within reach  - Keep bed low and locked with side rails adjusted as appropriate  - Keep care items and personal belongings within reach  - Initiate and maintain comfort rounds  - Make Fall Risk Sign visible to staff  - Offer Toileting every 2 Hours, in advance of need  - Initiate/Maintain bed alarm  - Obtain necessary fall risk management equipment  - Apply yellow socks and bracelet for high fall risk patients  - Consider moving patient to room near nurses station  Outcome: Progressing     Problem: CARDIOVASCULAR - ADULT  Goal: Maintains optimal cardiac output and hemodynamic stability  Description: INTERVENTIONS:  - Monitor I/O, vital signs and rhythm  - Monitor for S/S and trends of decreased cardiac output  - Administer and titrate ordered vasoactive medications to optimize hemodynamic stability  - Assess quality of pulses, skin color and temperature  - Assess for signs of decreased coronary artery  perfusion  - Instruct patient to report change in severity of symptoms  Outcome: Progressing  Goal: Absence of cardiac dysrhythmias or at baseline rhythm  Description: INTERVENTIONS:  - Continuous cardiac monitoring, vital signs, obtain 12 lead EKG if ordered  - Administer antiarrhythmic and heart rate control medications as ordered  - Monitor electrolytes and administer replacement therapy as ordered  Outcome: Progressing     Problem: RESPIRATORY - ADULT  Goal: Achieves optimal ventilation and oxygenation  Description: INTERVENTIONS:  - Assess for changes in respiratory status  - Assess for changes in mentation and behavior  - Position to facilitate oxygenation and minimize respiratory effort  - Oxygen administered by appropriate delivery if ordered  - Initiate smoking cessation education as indicated  - Encourage broncho-pulmonary hygiene including cough, deep breathe, Incentive Spirometry  - Assess the need for suctioning and aspirate as needed  - Assess and instruct to report SOB or any respiratory difficulty  - Respiratory Therapy support as indicated  Outcome: Progressing     Problem: GENITOURINARY - ADULT  Goal: Maintains or returns to baseline urinary function  Description: INTERVENTIONS:  - Assess urinary function  - Encourage oral fluids to ensure adequate hydration if ordered  - Administer IV fluids as ordered to ensure adequate hydration  - Administer ordered medications as needed  - Offer frequent toileting  - Follow urinary retention protocol if ordered  Outcome: Progressing  Goal: Absence of urinary retention  Description: INTERVENTIONS:  - Assess patient’s ability to void and empty bladder  - Monitor I/O  - Bladder scan as needed  - Discuss with physician/AP medications to alleviate retention as needed  - Discuss catheterization for long term situations as appropriate  Outcome: Progressing  Goal: Urinary catheter remains patent  Description: INTERVENTIONS:  - Assess patency of urinary catheter  - If  patient has a chronic carcamo, consider changing catheter if non-functioning  - Follow guidelines for intermittent irrigation of non-functioning urinary catheter  Outcome: Progressing

## 2025-01-01 NOTE — PROGRESS NOTES
Grace Clayton is a 89 y.o. female who is currently ordered Vancomycin IV with management by the Pharmacy Consult service.  Relevant clinical data and objective / subjective history reviewed.  Vancomycin Assessment:  Indication and Goal AUC/Trough: Soft tissue (goal -600, trough >10)  Clinical Status: stable  Micro:     Renal Function:  SCr: 0.56 mg/dL  CrCl: 35.2 mL/min  Renal replacement: Not on dialysis  Days of Therapy: 3  Current Dose: 1000 mg every 24 hours  Vancomycin Plan:  New Dosing: continue current dose  Estimated AUC: 587 mcg*hr/mL  Estimated Trough: 15.1 mcg/mL  Next Level: 1/7 @ 0600  Renal Function Monitoring: Daily BMP and UOP  Pharmacy will continue to follow closely for s/sx of nephrotoxicity, infusion reactions and appropriateness of therapy.  BMP and CBC will be ordered per protocol. We will continue to follow the patient’s culture results and clinical progress daily.    Molly Russell, Pharmacist

## 2025-01-01 NOTE — PROGRESS NOTES
Progress Note - Hospitalist   Name: Grace Clayton 89 y.o. female I MRN: [id number]  Unit/Bed#: S -01 I Date of Admission: 12/30/2024   Date of Service: 1/1/2025 I Hospital Day: 2    Assessment & Plan  Sepsis (HCC)  Sepsis has resolved  Most likely secondary to UTI  Continue vancomycin and cefepime  COVID-19  High risk given age will start treatment  Continue remdesivir  Not requiring antibiotics  UTI (urinary tract infection)  See antibiotics above patient with Enterococcus and Citrobacter  Goals of care, counseling/discussion  I had a discussion with patient's granddaughter who is her caregiver.  Recommended hospice care for this 89-year-old female with a stage IV sacral decubitus ulcer and underlying dementia.  Patient's granddaughter informed me she is trying to get her car and mother to her 89th birthday which is on January 26 of this month.  She has the number for an external hospice agency that was highly recommended by of dear friend.  Patient's caregiver is requesting we get her through this hospitalization and discharged her to home and she will arrange for hospice after discharge.  I offered to make arrangements for a hospice eval while patient in hospital currently being refused  Ambulatory dysfunction  Family plans on taking patient to home  Paroxysmal atrial fibrillation (HCC)  Heart rate much improved  Essential hypertension  Blood pressure is 138/80  Elevated troponin  Secondary to infection no indication for telemetry  Elevated TSH  Check T4- normal   No change to current meds      VTE Pharmacologic Prophylaxis:   High Risk (Score >/= 5) - Pharmacological DVT Prophylaxis Ordered: apixaban (Eliquis). Sequential Compression Devices Ordered.    Mobility:   Basic Mobility Inpatient Raw Score: 6  JH-HLM Goal: 2: Bed activities/Dependent transfer  JH-HLM Achieved: 2: Bed activities/Dependent transfer  JH-HLM Goal achieved. Continue to encourage appropriate mobility.    Patient Centered Rounds: I  performed bedside rounds with nursing staff today.     Education and Discussions with Family / Patient: Updated  (granddaughter) via phone.    Current Length of Stay: 2 day(s)  Current Patient Status: Inpatient   Certification Statement: The patient will continue to require additional inpatient hospital stay due to need for IV antibiotics  Discharge Plan: Anticipate discharge in 24-48 hrs to home with home services.    Code Status: Level 3 - DNAR and DNI    Subjective   Patient with baseline dementia unable to contribute to history    Objective :  Temp:  [96.9 °F (36.1 °C)-98.2 °F (36.8 °C)] 96.9 °F (36.1 °C)  HR:  [] 93  BP: ()/(50-91) 113/72  Resp:  [18-20] 18  SpO2:  [91 %-100 %] 100 %  O2 Device: Nasal cannula  Nasal Cannula O2 Flow Rate (L/min):  [2 L/min-3 L/min] 3 L/min    Body mass index is 19.44 kg/m².     Input and Output Summary (last 24 hours):     Intake/Output Summary (Last 24 hours) at 1/1/2025 1112  Last data filed at 1/1/2025 0836  Gross per 24 hour   Intake 340 ml   Output 525 ml   Net -185 ml       Physical Exam  Constitutional:       Comments: Cachectic and chronically ill   Cardiovascular:      Rate and Rhythm: Normal rate.      Pulses: Normal pulses.      Heart sounds: Normal heart sounds.   Pulmonary:      Effort: Pulmonary effort is normal.      Breath sounds: Normal breath sounds.   Musculoskeletal:      Right lower leg: No edema.      Left lower leg: No edema.   Neurological:      Mental Status: She is alert.           Lines/Drains:  Lines/Drains/Airways       Active Status       Name Placement date Placement time Site Days    Urethral Catheter Straight-tip 16 Fr. 12/10/24  1500  -- 21                  Urinary Catheter:  Goal for removal:  Maintain Chung for wound healing                 Lab Results: I have reviewed the following results:   Results from last 7 days   Lab Units 01/01/25  0628   WBC Thousand/uL 3.68*   HEMOGLOBIN g/dL 6.4*   HEMATOCRIT % 20.9*    PLATELETS Thousands/uL 203   SEGS PCT % 70   LYMPHO PCT % 22   MONO PCT % 8   EOS PCT % 0     Results from last 7 days   Lab Units 12/31/24  0535   SODIUM mmol/L 144   POTASSIUM mmol/L 2.7*   CHLORIDE mmol/L 103   CO2 mmol/L 34*   BUN mg/dL 23   CREATININE mg/dL 0.53*   ANION GAP mmol/L 7   CALCIUM mg/dL 7.9*   ALBUMIN g/dL 2.2*   TOTAL BILIRUBIN mg/dL 0.46   ALK PHOS U/L 48   ALT U/L 16   AST U/L 44*   GLUCOSE RANDOM mg/dL 109         Results from last 7 days   Lab Units 12/30/24  1136   POC GLUCOSE mg/dl 125         Results from last 7 days   Lab Units 12/30/24  1826 12/30/24  1532   LACTIC ACID mmol/L 1.8 3.0*       Recent Cultures (last 7 days):   Results from last 7 days   Lab Units 12/30/24  1325   URINE CULTURE  >100,000 cfu/ml Citrobacter freundii*  >100,000 cfu/ml Enterococcus faecalis*       Imaging Results Review: No pertinent imaging studies reviewed.  Other Study Results Review: No additional pertinent studies reviewed.    Last 24 Hours Medication List:     Current Facility-Administered Medications:     acetaminophen (TYLENOL) tablet 325 mg, Q6H ANY    apixaban (ELIQUIS) tablet 2.5 mg, BID    bisacodyl (DULCOLAX) EC tablet 5 mg, 0200    bisacodyl (DULCOLAX) rectal suppository 10 mg, Daily PRN    ceftriaxone (ROCEPHIN) 1 g/50 mL in dextrose IVPB, Q24H, Last Rate: 1,000 mg (12/31/24 1720)    dorzolamide-timolol (COSOPT) 2-0.5 % ophthalmic solution 1 drop, BID    latanoprost (XALATAN) 0.005 % ophthalmic solution 1 drop, HS    metoprolol tartrate (LOPRESSOR) tablet 25 mg, Q12H ANY    vancomycin (VANCOCIN) 1000 mg in sodium chloride 0.9% 250 mL IVPB, Q24H    Administrative Statements   Today, Patient Was Seen By: Claudio Nunes MD      **Please Note: This note may have been constructed using a voice recognition system.**

## 2025-01-01 NOTE — ASSESSMENT & PLAN NOTE
I had a discussion with patient's granddaughter who is her caregiver.  Recommended hospice care for this 89-year-old female with a stage IV sacral decubitus ulcer and underlying dementia.  Patient's granddaughter informed me she is trying to get her car and mother to her 89th birthday which is on January 26 of this month.  She has the number for an external hospice agency that was highly recommended by of dear friend.  Patient's caregiver is requesting we get her through this hospitalization and discharged her to home and she will arrange for hospice after discharge.  I offered to make arrangements for a hospice eval while patient in hospital currently being refused

## 2025-01-02 VITALS
HEART RATE: 109 BPM | RESPIRATION RATE: 15 BRPM | DIASTOLIC BLOOD PRESSURE: 71 MMHG | TEMPERATURE: 98.5 F | WEIGHT: 106.26 LBS | OXYGEN SATURATION: 96 % | HEIGHT: 62 IN | SYSTOLIC BLOOD PRESSURE: 110 MMHG | BODY MASS INDEX: 19.55 KG/M2

## 2025-01-02 LAB
ANION GAP SERPL CALCULATED.3IONS-SCNC: 2 MMOL/L (ref 4–13)
BACTERIA UR CULT: ABNORMAL
BUN SERPL-MCNC: 17 MG/DL (ref 5–25)
CALCIUM SERPL-MCNC: 8 MG/DL (ref 8.4–10.2)
CHLORIDE SERPL-SCNC: 108 MMOL/L (ref 96–108)
CO2 SERPL-SCNC: 33 MMOL/L (ref 21–32)
CREAT SERPL-MCNC: 0.61 MG/DL (ref 0.6–1.3)
ERYTHROCYTE [DISTWIDTH] IN BLOOD BY AUTOMATED COUNT: 19.4 % (ref 11.6–15.1)
GFR SERPL CREATININE-BSD FRML MDRD: 75 ML/MIN/1.73SQ M
GLUCOSE SERPL-MCNC: 120 MG/DL (ref 65–140)
HCT VFR BLD AUTO: 27.8 % (ref 34.8–46.1)
HGB BLD-MCNC: 8.4 G/DL (ref 11.5–15.4)
MAGNESIUM SERPL-MCNC: 1.8 MG/DL (ref 1.9–2.7)
MCH RBC QN AUTO: 31.1 PG (ref 26.8–34.3)
MCHC RBC AUTO-ENTMCNC: 30.2 G/DL (ref 31.4–37.4)
MCV RBC AUTO: 103 FL (ref 82–98)
PLATELET # BLD AUTO: 279 THOUSANDS/UL (ref 149–390)
PMV BLD AUTO: 10.3 FL (ref 8.9–12.7)
POTASSIUM SERPL-SCNC: 4.5 MMOL/L (ref 3.5–5.3)
RBC # BLD AUTO: 2.7 MILLION/UL (ref 3.81–5.12)
SODIUM SERPL-SCNC: 143 MMOL/L (ref 135–147)
WBC # BLD AUTO: 5.57 THOUSAND/UL (ref 4.31–10.16)

## 2025-01-02 PROCEDURE — 80048 BASIC METABOLIC PNL TOTAL CA: CPT | Performed by: INTERNAL MEDICINE

## 2025-01-02 PROCEDURE — 85027 COMPLETE CBC AUTOMATED: CPT | Performed by: INTERNAL MEDICINE

## 2025-01-02 PROCEDURE — 83735 ASSAY OF MAGNESIUM: CPT | Performed by: INTERNAL MEDICINE

## 2025-01-02 PROCEDURE — 99238 HOSP IP/OBS DSCHRG MGMT 30/<: CPT | Performed by: INTERNAL MEDICINE

## 2025-01-02 RX ADMIN — APIXABAN 2.5 MG: 2.5 TABLET, FILM COATED ORAL at 17:45

## 2025-01-02 RX ADMIN — METOPROLOL TARTRATE 25 MG: 25 TABLET, FILM COATED ORAL at 10:11

## 2025-01-02 RX ADMIN — APIXABAN 2.5 MG: 2.5 TABLET, FILM COATED ORAL at 10:11

## 2025-01-02 RX ADMIN — CEFEPIME 1000 MG: 1 INJECTION, POWDER, FOR SOLUTION INTRAMUSCULAR; INTRAVENOUS at 02:22

## 2025-01-02 RX ADMIN — DORZOLAMIDE HYDROCHLORIDE AND TIMOLOL MALEATE 1 DROP: 20; 5 SOLUTION OPHTHALMIC at 17:45

## 2025-01-02 RX ADMIN — CEFEPIME 1000 MG: 1 INJECTION, POWDER, FOR SOLUTION INTRAMUSCULAR; INTRAVENOUS at 12:06

## 2025-01-02 RX ADMIN — DORZOLAMIDE HYDROCHLORIDE AND TIMOLOL MALEATE 1 DROP: 20; 5 SOLUTION OPHTHALMIC at 10:11

## 2025-01-02 NOTE — CASE MANAGEMENT
Case Management Assessment & Discharge Planning Note    Patient name Grace Clayton  Location S /S -01 MRN [id number]  : 1925 Date 2025       Current Admission Date: 2024  Current Admission Diagnosis:Sepsis (HCC)   Patient Active Problem List    Diagnosis Date Noted Date Diagnosed    UTI (urinary tract infection) 2024     COVID-19 2024     Sepsis (HCC) 2024     Elevated TSH 2024     Urinary tract infection associated with indwelling urethral catheter  (HCC) 2024     Palliative care by specialist 10/28/2024     Goals of care, counseling/discussion 10/28/2024     Moderate protein-calorie malnutrition (HCC) 10/28/2024     Elevated troponin 10/27/2024     Pressure ulcers of skin of multiple topographic sites 10/27/2024     Dysphagia 10/27/2024     Failure to thrive in adult 10/27/2024     Anemia 01/15/2024     Suspected dementia with agitation (HCC) 2024     RSV infection with wrosening cough 2024     GERD (gastroesophageal reflux disease) 2024     Non-MI troponin elevation 2024     Generalized weakness 2023     Paroxysmal atrial fibrillation (HCC) 2023     Ambulatory dysfunction 2023     Anxiety 2023     Hyperlipidemia 2012     Essential hypertension 2012       LOS (days): 3  Geometric Mean LOS (GMLOS) (days): 4.9  Days to GMLOS:2     OBJECTIVE:  PATIENT READMITTED TO HOSPITAL  Risk of Unplanned Readmission Score: 22.18         Current admission status: Inpatient       Preferred Pharmacy:   [id number] [place], [place] - [address]  [address] [place] [address]  Phone: [phone] Fax: [phone]    Primary Care Provider: No primary care provider on file.    Primary Insurance: MEDICARE  Secondary Insurance: [employer]    ASSESSMENT:  Active Health Care Proxies       Molly Braden Health Care Representative - Grandchild   Primary Phone:  [phone] (Mobile)  Home Phone: [phone]                                Patient Information  Admitted from:: Home  Mental Status: Alert  During Assessment patient was accompanied by: Not accompanied during assessment  Assessment information provided by:: Other - please comment (Granddaughter)  Primary Caregiver: Family  Caregiver's Name:: Molly (Ivana)  Caregiver's Relationship to Patient:: Family Member  Caregiver's Telephone Number:: [phone]  Support Systems: Family members  County of Residence: [place]  What city do you live in?: [place]  Home entry access options. Select all that apply.: Stairs  Number of steps to enter home.: 6  Type of Current Residence: 2 story home  Upon entering residence, is there a bedroom on the main floor (no further steps)?: Yes  Upon entering residence, is there a bathroom on the main floor (no further steps)?: Yes  Living Arrangements: Lives w/ Extended Family, Other (Comment) (granddaughter, ayaan's  and great grandchildren)    Activities of Daily Living Prior to Admission  Functional Status: Assistance  Completes ADLs independently?: No  Level of ADL dependence: Assistance  Ambulates independently?: No  Does patient use assisted devices?: Yes  Assisted Devices (DME) used: Hospital Bed, Wheelchair  Does patient currently own DME?: Yes  What DME does the patient currently own?: Hospital Bed, Wheelchair, Walker, Bedside Commode, Shower Chair  Does the patient have a history of Short-Term Rehab?: Yes  Does patient have a history of HHC?: Yes  Does patient currently have HHC?: Yes    Current Home Health Care  Type of Current Home Care Services: Nurse visit, Home health aide  [facility] Health Agency Name::  [facility]  Current Home Health Follow-Up Provider:: PCP              Means of Transportation  Means of Transport to Appts:: Other (Comment) (ayaan reported utilizes [facility] for pt transport needs)      Social Determinants of Health  (SDOH)      Flowsheet Row Most Recent Value   Housing Stability    In the last 12 months, was there a time when you were not able to pay the mortgage or rent on time? N   At any time in the past 12 months, were you homeless or living in a shelter (including now)? N   Transportation Needs    In the past 12 months, has lack of transportation kept you from medical appointments or from getting medications? no   In the past 12 months, has lack of transportation kept you from meetings, work, or from getting things needed for daily living? No   Food Insecurity    Within the past 12 months, you worried that your food would run out before you got the money to buy more. Never true   Within the past 12 months, the food you bought just didn't last and you didn't have money to get more. Never true   Utilities    In the past 12 months has the electric, gas, oil, or water company threatened to shut off services in your home? No            DISCHARGE DETAILS:    Discharge planning discussed with:: patient's ayaan Barnes  Freedom of Choice: Yes  Comments - Freedom of Choice: Grddtr relayed patient is current with [facility] for SN and HHA - grddtr relayed choice for [facility] w/ [facility]. [facility] - confirmed and reserved. AVS updated. Grddtr relayed pt utilizes [facility] for transport needs as pt is bedbound and requested for transport around 2pm or after for return to home today. Transport referral placed to [facility] w/ preference for [facility] at family request if possbile, awaiting confirmation of p/u time - requested for 2pm p/u.  CM contacted family/caregiver?: Yes  Were Treatment Team discharge recommendations reviewed with patient/caregiver?: Yes  Did patient/caregiver verbalize understanding of patient care needs?: Yes  Were patient/caregiver advised of the risks associated with not following Treatment Team discharge recommendations?: Yes    Contacts  Patient Contacts: Molly  Sampson/Granddaughter  Relationship to Patient:: Family  Contact Method: Phone  Phone Number: [phone]  Reason/Outcome: Continuity of Care, Emergency Contact, Discharge Planning, Referral    Requested Home Health Care         Is the patient interested in HHC at discharge?: Yes  Home Health Discipline requested:: Nursing, Home Health Aide  Home Health Agency Name:: [facility]  HHA External Referral Reason (only applicable if external HHA name selected): Patient has established relationship with provider  Home Health Follow-Up Provider:: PCP  Home Health Services Needed:: Evaluate Functional Status and Safety, Wound/Ostomy Care  Homebound Criteria Met:: Requires the Assistance of Another Person for Safe Ambulation or to Leave the Home, Uses an Assist Device (i.e. cane, walker, etc)  Supporting Clincal Findings:: Bed Bound or Wheelchair Bound    DME Referral Provided  Referral made for DME?: No    Other Referral/Resources/Interventions Provided:  Referral Comments: [facility] reserved in aidin. AVS updated. [facility] - requested for 2pm p/u - pending confirmation.         Treatment Team Recommendation: Home with Home Health Care  Discharge Destination Plan:: Home with Home Health Care  Transport at Discharge : [facility]        Transported by (Company and Unit #): pending confirmation  ETA of Transport (Date): 01/02/25  ETA of Transport (Time): 1400           IMM reviewed with patient's caregiver, patient's caregiver agrees with discharge determination.  IMM Given (Date):: 01/02/25  IMM Given to:: Family  Family notified:: Molly LoredogrddtrTanya

## 2025-01-02 NOTE — PROGRESS NOTES
Grace Clayton is a 89 y.o. female who is currently ordered Vancomycin IV with management by the Pharmacy Consult service.  Relevant clinical data and objective / subjective history reviewed.  Vancomycin Assessment:  Indication and Goal AUC/Trough: Soft tissue (goal -600, trough >10)  Clinical Status: stable  Micro:     Renal Function:  SCr: 0.61 mg/dL  CrCl: 32.6 mL/min  Renal replacement: Not on dialysis  Days of Therapy: 4  Current Dose: 1000 mg every 24 hours  Vancomycin Plan:  New Dosin mg every 24 hours  Estimated AUC: 498 mcg*hr/mL  Estimated Trough: 13.4 mcg/mL  Next Level:  @ 0600  Renal Function Monitoring: Daily BMP and UOP  Pharmacy will continue to follow closely for s/sx of nephrotoxicity, infusion reactions and appropriateness of therapy.  BMP and CBC will be ordered per protocol. We will continue to follow the patient’s culture results and clinical progress daily.    Molly Russell, Pharmacist

## 2025-01-02 NOTE — PLAN OF CARE
Problem: Prexisting or High Potential for Compromised Skin Integrity  Goal: Skin integrity is maintained or improved  Description: INTERVENTIONS:  - Identify patients at risk for skin breakdown  - Assess and monitor skin integrity  - Assess and monitor nutrition and hydration status  - Monitor labs   - Assess for incontinence   - Turn and reposition patient  - Assist with mobility/ambulation  - Relieve pressure over bony prominences  - Avoid friction and shearing  - Provide appropriate hygiene as needed including keeping skin clean and dry  - Evaluate need for skin moisturizer/barrier cream  - Collaborate with interdisciplinary team   - Patient/family teaching  - Consider wound care consult   Outcome: Adequate for Discharge     Problem: Potential for Falls  Goal: Patient will remain free of falls  Description: INTERVENTIONS:  - Educate patient/family on patient safety including physical limitations  - Instruct patient to call for assistance with activity   - Consult OT/PT to assist with strengthening/mobility   - Keep Call bell within reach  - Keep bed low and locked with side rails adjusted as appropriate  - Keep care items and personal belongings within reach  - Initiate and maintain comfort rounds  - Make Fall Risk Sign visible to staff  - Offer Toileting every 2 Hours, in advance of need  - Initiate/Maintain bed alarm  - Obtain necessary fall risk management equipment  - Apply yellow socks and bracelet for high fall risk patients  - Consider moving patient to room near nurses station  Outcome: Adequate for Discharge     Problem: CARDIOVASCULAR - ADULT  Goal: Maintains optimal cardiac output and hemodynamic stability  Description: INTERVENTIONS:  - Monitor I/O, vital signs and rhythm  - Monitor for S/S and trends of decreased cardiac output  - Administer and titrate ordered vasoactive medications to optimize hemodynamic stability  - Assess quality of pulses, skin color and temperature  - Assess for signs of  decreased coronary artery perfusion  - Instruct patient to report change in severity of symptoms  Outcome: Adequate for Discharge  Goal: Absence of cardiac dysrhythmias or at baseline rhythm  Description: INTERVENTIONS:  - Continuous cardiac monitoring, vital signs, obtain 12 lead EKG if ordered  - Administer antiarrhythmic and heart rate control medications as ordered  - Monitor electrolytes and administer replacement therapy as ordered  Outcome: Adequate for Discharge     Problem: RESPIRATORY - ADULT  Goal: Achieves optimal ventilation and oxygenation  Description: INTERVENTIONS:  - Assess for changes in respiratory status  - Assess for changes in mentation and behavior  - Position to facilitate oxygenation and minimize respiratory effort  - Oxygen administered by appropriate delivery if ordered  - Initiate smoking cessation education as indicated  - Encourage broncho-pulmonary hygiene including cough, deep breathe, Incentive Spirometry  - Assess the need for suctioning and aspirate as needed  - Assess and instruct to report SOB or any respiratory difficulty  - Respiratory Therapy support as indicated  Outcome: Adequate for Discharge     Problem: GENITOURINARY - ADULT  Goal: Maintains or returns to baseline urinary function  Description: INTERVENTIONS:  - Assess urinary function  - Encourage oral fluids to ensure adequate hydration if ordered  - Administer IV fluids as ordered to ensure adequate hydration  - Administer ordered medications as needed  - Offer frequent toileting  - Follow urinary retention protocol if ordered  Outcome: Adequate for Discharge  Goal: Absence of urinary retention  Description: INTERVENTIONS:  - Assess patient’s ability to void and empty bladder  - Monitor I/O  - Bladder scan as needed  - Discuss with physician/AP medications to alleviate retention as needed  - Discuss catheterization for long term situations as appropriate  Outcome: Adequate for Discharge  Goal: Urinary catheter remains  patent  Description: INTERVENTIONS:  - Assess patency of urinary catheter  - If patient has a chronic carcamo, consider changing catheter if non-functioning  - Follow guidelines for intermittent irrigation of non-functioning urinary catheter  Outcome: Adequate for Discharge

## 2025-01-02 NOTE — PLAN OF CARE
Problem: Prexisting or High Potential for Compromised Skin Integrity  Goal: Skin integrity is maintained or improved  Description: INTERVENTIONS:  - Identify patients at risk for skin breakdown  - Assess and monitor skin integrity  - Assess and monitor nutrition and hydration status  - Monitor labs   - Assess for incontinence   - Turn and reposition patient  - Assist with mobility/ambulation  - Relieve pressure over bony prominences  - Avoid friction and shearing  - Provide appropriate hygiene as needed including keeping skin clean and dry  - Evaluate need for skin moisturizer/barrier cream  - Collaborate with interdisciplinary team   - Patient/family teaching  - Consider wound care consult   Outcome: Not Progressing     Problem: Potential for Falls  Goal: Patient will remain free of falls  Description: INTERVENTIONS:  - Educate patient/family on patient safety including physical limitations  - Instruct patient to call for assistance with activity   - Consult OT/PT to assist with strengthening/mobility   - Keep Call bell within reach  - Keep bed low and locked with side rails adjusted as appropriate  - Keep care items and personal belongings within reach  - Initiate and maintain comfort rounds  - Make Fall Risk Sign visible to staff  - Offer Toileting every  Hours, in advance of need  - Initiate/Maintain alarm  - Obtain necessary fall risk management equipment:   - Apply yellow socks and bracelet for high fall risk patients  - Consider moving patient to room near nurses station  Outcome: Not Progressing     Problem: CARDIOVASCULAR - ADULT  Goal: Maintains optimal cardiac output and hemodynamic stability  Description: INTERVENTIONS:  - Monitor I/O, vital signs and rhythm  - Monitor for S/S and trends of decreased cardiac output  - Administer and titrate ordered vasoactive medications to optimize hemodynamic stability  - Assess quality of pulses, skin color and temperature  - Assess for signs of decreased coronary  artery perfusion  - Instruct patient to report change in severity of symptoms  Outcome: Not Progressing  Goal: Absence of cardiac dysrhythmias or at baseline rhythm  Description: INTERVENTIONS:  - Continuous cardiac monitoring, vital signs, obtain 12 lead EKG if ordered  - Administer antiarrhythmic and heart rate control medications as ordered  - Monitor electrolytes and administer replacement therapy as ordered  Outcome: Not Progressing

## 2025-01-02 NOTE — CASE MANAGEMENT
Case Management Discharge Planning Note    Patient name Grace Clayton  Location S /S -01 MRN [id number]  : 1925 Date 2025       Current Admission Date: 2024  Current Admission Diagnosis:Sepsis (HCC)   Patient Active Problem List    Diagnosis Date Noted Date Diagnosed    UTI (urinary tract infection) 2024     COVID-19 2024     Sepsis (HCC) 2024     Elevated TSH 2024     Urinary tract infection associated with indwelling urethral catheter  (HCC) 2024     Palliative care by specialist 10/28/2024     Goals of care, counseling/discussion 10/28/2024     Moderate protein-calorie malnutrition (HCC) 10/28/2024     Elevated troponin 10/27/2024     Pressure ulcers of skin of multiple topographic sites 10/27/2024     Dysphagia 10/27/2024     Failure to thrive in adult 10/27/2024     Anemia 01/15/2024     Suspected dementia with agitation (HCC) 2024     RSV infection with wrosening cough 2024     GERD (gastroesophageal reflux disease) 2024     Non-MI troponin elevation 2024     Generalized weakness 2023     Paroxysmal atrial fibrillation (HCC) 2023     Ambulatory dysfunction 2023     Anxiety 2023     Hyperlipidemia 2012     Essential hypertension 2012       LOS (days): 3  Geometric Mean LOS (GMLOS) (days): 4.9  Days to GMLOS:2     OBJECTIVE:  Risk of Unplanned Readmission Score: 22.18         Current admission status: Inpatient   Preferred Pharmacy:   [id number] [address] [address]  Phone: [phone] Fax: [phone]    Primary Care Provider: No primary care provider on file.    Primary Insurance: MEDICARE  Secondary Insurance: [employer]    DISCHARGE DETAILS:    CM contacted family/caregiver?: Yes (left VM to ayaan notifying of confirmed p/u time for home today and [facility] confirmed for ROSE)      Contacts  Patient  Contacts: Molly Braden/Granddaughter  Relationship to Patient:: Family  Contact Method: Phone  Phone Number: [phone]  Reason/Outcome: Continuity of Care, Emergency Contact, Discharge Planning, Referral        Other Referral/Resources/Interventions Provided:  Interventions: Transportation  Referral Comments: Transport confirmed for 1400 p/u time via [facility] to home today. All parties notified of same.         Treatment Team Recommendation: Home with Home Health Care  Discharge Destination Plan:: Home with Home Health Care  Transport at Discharge : [facility]        Transported by (Company and Unit #): [facility]  ETA of Transport (Date): 01/02/25  ETA of Transport (Time): 1400

## 2025-01-02 NOTE — ASSESSMENT & PLAN NOTE
Secondary to polymicrobial urinary tract infection.  Patient's status post 3 days of IV antibiotics for discharge    Please note evidence of chronic osteo on CT scan of the abdomen pelvis involving the sacrum and coccygeal region.  I long conversation with patient's granddaughter and caregiver.  We have opted to conservatively treat this with ongoing VNA and wound care.  She has no interest in bone biopsy or aggressive antibiotic therapy for her 99-year-old grandmother.  We did not reiterate discussions about hospice evaluation which she will be arranging after discharge to home.  This patient's overall prognosis is very poor and she is certainly hospice appropriate

## 2025-01-02 NOTE — CASE MANAGEMENT
Case Management Discharge Planning Note    Patient name Grace García S /S -01 MRN [id number]  : 1925 Date 2025       Current Admission Date: 2024  Current Admission Diagnosis:Sepsis (HCC)   Patient Active Problem List    Diagnosis Date Noted Date Diagnosed    UTI (urinary tract infection) 2024     COVID-19 2024     Sepsis (HCC) 2024     Elevated TSH 2024     Urinary tract infection associated with indwelling urethral catheter  (HCC) 2024     Palliative care by specialist 10/28/2024     Goals of care, counseling/discussion 10/28/2024     Moderate protein-calorie malnutrition (HCC) 10/28/2024     Elevated troponin 10/27/2024     Pressure ulcers of skin of multiple topographic sites 10/27/2024     Dysphagia 10/27/2024     Failure to thrive in adult 10/27/2024     Anemia 01/15/2024     Suspected dementia with agitation (HCC) 2024     RSV infection with wrosening cough 2024     GERD (gastroesophageal reflux disease) 2024     Non-MI troponin elevation 2024     Generalized weakness 2023     Paroxysmal atrial fibrillation (HCC) 2023     Ambulatory dysfunction 2023     Anxiety 2023     Hyperlipidemia 2012     Essential hypertension 2012       LOS (days): 3  Geometric Mean LOS (GMLOS) (days): 4.9  Days to GMLOS:2     OBJECTIVE:  Risk of Unplanned Readmission Score: 22.18         Current admission status: Inpatient   Preferred Pharmacy:   [id number] [address] [address]  Phone: [phone] Fax: [phone]    Primary Care Provider: No primary care provider on file.    Primary Insurance: MEDICARE  Secondary Insurance: [employer]    DISCHARGE DETAILS:    Discharge planning discussed with:: patient's grddtr Molly  Freedom of Choice: Yes  Comments - Freedom of Choice: [facility] notified this CM of grddtr now wanting  Hospice care for patient. CM f/u with pt's grddtr via phone re: same. Grddtr [facility]. Grddtr aware [facility] will discontinue services. Referral sent to [facility] via [facility] - confirmed and reserved - hospice liaison to reach out directly to grddtr. Grddtr aware of same. Grddtr requested transport to be pushed back till 5 - CM contacted [facility] via phone re: same, s/w Swetha, confirmed transport p/u in place now for 6pm. CM left VM to grddtr notifying of same.  CM contacted family/caregiver?: Yes             Contacts  Patient Contacts: Molly Braden/Granddaughter  Relationship to Patient:: Family  Contact Method: Phone  Phone Number: [phone]  Reason/Outcome: Continuity of Care, Emergency Contact, Discharge Planning, Referral    Requested Home Health Care         Is the patient interested in HHC at discharge?: No    DME Referral Provided  Referral made for DME?: No    Other Referral/Resources/Interventions Provided:  Interventions: Transportation  Referral Comments: [facility] reserved in [facility].         Treatment Team Recommendation: Home, Hospice  Discharge Destination Plan:: Home, Hospice  Transport at Discharge : [facility]        Transported by (Company and Unit #): [facility]  ETA of Transport (Date): 01/02/25  ETA of Transport (Time): 1800

## 2025-01-02 NOTE — DISCHARGE SUMMARY
Discharge Summary - Hospitalist   Name: Grace Clayton 89 y.o. female I MRN: [id number]  Unit/Bed#: S -01 I Date of Admission: 12/30/2024   Date of Service: 1/2/2025 I Hospital Day: 3     Assessment & Plan  Sepsis (HCC)  Secondary to polymicrobial urinary tract infection.  Patient's status post 3 days of IV antibiotics for discharge    Please note evidence of chronic osteo on CT scan of the abdomen pelvis involving the sacrum and coccygeal region.  I long conversation with patient's granddaughter and caregiver.  We have opted to conservatively treat this with ongoing VNA and wound care.  She has no interest in bone biopsy or aggressive antibiotic therapy for her 99-year-old grandmother.  We did not reiterate discussions about hospice evaluation which she will be arranging after discharge to home.  This patient's overall prognosis is very poor and she is certainly hospice appropriate  COVID-19  Patient improved off oxygen mental status much improved stable for discharge  UTI (urinary tract infection)  See antibiotics above patient with Enterococcus and Citrobacter  Goals of care, counseling/discussion  Patient's granddaughter apparently arranging for home hospice after discharge  Ambulatory dysfunction  Patient for discharge to home  Paroxysmal atrial fibrillation (HCC)  Continue apixaban and metoprolol  Essential hypertension  Blood pressure is 138/80  Elevated TSH  Check T4- normal   No change to current meds       Medical Problems       Resolved Problems  Date Reviewed: 12/6/2024   None       Discharging Physician / Practitioner: Claudio Nunes MD  PCP: No primary care provider on file.  Admission Date:   Admission Orders (From admission, onward)       Ordered        12/30/24 1404  INPATIENT ADMISSION  Once                          Discharge Date: 01/02/25    Consultations During Hospital Stay:  None    Procedures Performed:   CT chest abdomen and pelvis. Moderate bibasilar pleural effusions with compressive  atelectasis.     Anterior cortical right lower pole renal mass versus motion artifact. This portion of the right kidney was also obscured by motion artifact on the 1/13/2024 contrast-enhanced exam. Follow-up with ultrasound confirmation of a lesion with subsequent MRI   abdomen if necessary.     Large quantity of rectal vault stool in keeping with impaction.     Sacral decubitus ulcer with similar chronic sacral and coccygeal osteomyelitis   Chest x-ray hazy patchy consolidation right middle to lower lung zone stable since December    Significant Findings / Test Results:   As above    Incidental Findings:   none     Test Results Pending at Discharge (will require follow up):   none     Outpatient Tests Requested:  none    Complications:  none    Reason for Admission: Altered mental status    Hospital Course:   Grace Clayton is a 89 y.o. female patient who originally presented to the hospital on 12/30/2024 due to sepsis secondary to UTI with COVID and chronic osteomyelitis.  Patient was treated with IV antibiotics specifically 3-day course of cefepime and vancomycin for Enterococcus and Citrobacter.  From a COVID standpoint she is doing very well and is off all therapy with normal oxygen saturations on room air.    Did have a goals of care conversation with the patient's granddaughter and caregiver.  She is requesting that we discharge the patient home where they are providing 24-hour care and she will arrange for hospice evaluation on an outpatient basis.  I did discuss the findings on CAT scan and that her grandmother is very appropriate for hospice given her multiple medical comorbidities and advanced age  Please see above list of diagnoses and related plan for additional information.     Condition at Discharge: stable    Discharge Day Visit / Exam:   Subjective:  much more alert today. In no pain  Vitals: Blood Pressure: 125/79 (01/02/25 0847)  Pulse: (!) 109 (01/02/25 0847)  Temperature: (!) 97.1 °F (36.2 °C)  "(01/02/25 0847)  Temp Source: Axillary (01/01/25 2354)  Respirations: 15 (01/02/25 0847)  Height: 5' 2\" (157.5 cm) (12/30/24 1900)  Weight - Scale: 48.2 kg (106 lb 4.2 oz) (12/30/24 1900)  SpO2: 96 % (01/02/25 0847)  Physical Exam  Constitutional:       General: She is not in acute distress.     Appearance: She is not diaphoretic.      Comments: Cachectic   Cardiovascular:      Rate and Rhythm: Normal rate.      Pulses: Normal pulses.   Pulmonary:      Effort: Pulmonary effort is normal. No respiratory distress.      Breath sounds: No wheezing or rales.   Neurological:      Mental Status: She is alert.          Discussion with Family: Updated  (granddaughter) via phone.    Discharge instructions/Information to patient and family:   See after visit summary for information provided to patient and family.      Provisions for Follow-Up Care:  See after visit summary for information related to follow-up care and any pertinent home health orders.      Mobility at time of Discharge:   Basic Mobility Inpatient Raw Score: 6  JH-HLM Goal: 2: Bed activities/Dependent transfer  JH-HLM Achieved: 2: Bed activities/Dependent transfer  HLM Goal achieved. Continue to encourage appropriate mobility.     Disposition:   Home with VNA Services (Reminder: Complete face to face encounter)    Planned Readmission: no    Discharge Medications:  See after visit summary for reconciled discharge medications provided to patient and/or family.      Administrative Statements   Discharge Statement:  I have spent a total time of 25 minutes in caring for this patient on the day of the visit/encounter. >30 minutes of time was spent on: Prognosis, Risks and benefits of tx options, Patient and family education, Impressions, Documenting in the medical record, and Reviewing / ordering tests, medicine, procedures  .    **Please Note: This note may have been constructed using a voice recognition system**  "

## 2025-01-06 NOTE — PROGRESS NOTES
Virtual Regular Visit  Name: Grace Clayton      : 1925      MRN: [id number]  Encounter Provider: ROSARIO Zamudio  Encounter Date: 2024   Encounter department: [facility] VIRTUAL      Verification of patient location:  Patient is located at Home in the following state in which I hold an active license [place] :  Assessment & Plan  Urinary tract infection associated with indwelling urethral catheter, subsequent encounter  Resolved  Patient s/p abx therapy r/t UTI felt to be secondary to chronic indwelling carcamo  Follow up with PCP         Dysphagia, unspecified type  History of dysphagia with current recommendations pureed with nectar thick liquids  Patient at home is tolerating diet puree with thin liquids  Maintain aspiration precautions  Follow up with PCP         Dementia with agitation, unspecified dementia severity, unspecified dementia type (HCC)  Continue supportive measures  Continue to reorient, redirect, and reassure patient  Encourage engagement and activity  Encourage daily involvement in ADLs  Maintain delirium precautions and sleep/wake cycle  Patient remains at risk for delirium r/t age, medications, sleep disturbance, and pain  Avoid deliriogenic medications such as tramadol, benzodiazepines, anticholinergics, and Benadryl  Limit interruptions at night as medically appropriate  Provide quiet environment, dim lighting, and avoidance tv at night  Patient continues to require assistance in which supportive services can be provided by VNA services  Encourage adequate nutrition and hydration  Continue to monitor for acute changes in condition  Follow up with PCP           Essential hypertension  BP remains stable today, 114/68  Continue Metoprolol 25 mg BID  Avoid hypotension  Continue to monitor BP and for acute changes in condition  Follow up with PCP/Cardiology          Paroxysmal atrial fibrillation (HCC)  Continue Metoprolol 25 mg BID for rate control  Continue to  monitor HR, 102 today  Continue Eliquis 5 mg po BID for anticoag  Monitor for s/s of bleeding and acute changes in condition  Follow up with PCP/Cardiology          Pressure ulcers of skin of multiple topographic sites  Multiple areas of skin breakdown with Stage lV sacral wound   Continue local wound care as ordered  Continue to monitor for s/s of infection  Encourage frequent changes in position and offloading  VNA will continue wound care services in home as ordered  Follow up with wound care center          Moderate protein-calorie malnutrition (HCC)  Malnutrition Findings:   Multifactorial including acute illness, inadequate energy, weight loss, muscle wasting, and fat loss  Continue to monitor weight and BMI   Continue all nutritional supplements  Consult nutritional services prn  Monitor for acute changes in condition  Follow up with PCP          Ambulatory dysfunction  Patient uses WC at baseline  Maintain fall and safety precautions  Encourage use of asst devices  Continue PT/OT services with VNA  Assess for need with assistance with transfers, mobility, and ADLs in/out of home  Patient is at high risk for falls r/t cognitive impairment, protein calorie malnutrition, pain, and age  Continue to monitor for acute changes in condition   Follow up with PCP          Urinary tract infection associated with indwelling urethral catheter, subsequent encounter  Resolved  Patient s/p abx therapy r/t UTI felt to be secondary to chronic indwelling carcamo  Follow up with PCP       Dysphagia, unspecified type  History of dysphagia with current recommendations pureed with nectar thick liquids  Patient at home is tolerating diet puree with thin liquids  Maintain aspiration precautions  Follow up with PCP       Dementia with agitation, unspecified dementia severity, unspecified dementia type (HCC)  Continue supportive measures  Continue to reorient, redirect, and reassure patient  Encourage engagement and activity  Encourage  daily involvement in ADLs  Maintain delirium precautions and sleep/wake cycle  Patient remains at risk for delirium r/t age, medications, sleep disturbance, and pain  Avoid deliriogenic medications such as tramadol, benzodiazepines, anticholinergics, and Benadryl  Limit interruptions at night as medically appropriate  Provide quiet environment, dim lighting, and avoidance tv at night  Patient continues to require assistance in which supportive services can be provided by VNA services  Encourage adequate nutrition and hydration  Continue to monitor for acute changes in condition  Follow up with PCP         Essential hypertension  BP remains stable today, 114/68  Continue Metoprolol 25 mg BID  Avoid hypotension  Continue to monitor BP and for acute changes in condition  Follow up with PCP/Cardiology        Paroxysmal atrial fibrillation (HCC)  Continue Metoprolol 25 mg BID for rate control  Continue to monitor HR, 102 today  Continue Eliquis 5 mg po BID for anticoag  Monitor for s/s of bleeding and acute changes in condition  Follow up with PCP/Cardiology        Pressure ulcers of skin of multiple topographic sites  Multiple areas of skin breakdown with Stage lV sacral wound   Continue local wound care as ordered  Continue to monitor for s/s of infection  Encourage frequent changes in position and offloading  VNA will continue wound care services in home as ordered  Follow up with wound care center        Moderate protein-calorie malnutrition (HCC)  Malnutrition Findings:   Multifactorial including acute illness, inadequate energy, weight loss, muscle wasting, and fat loss  Continue to monitor weight and BMI   Continue all nutritional supplements  Consult nutritional services prn  Monitor for acute changes in condition  Follow up with PCP        Ambulatory dysfunction  Patient uses WC at baseline  Maintain fall and safety precautions  Encourage use of asst devices  Continue PT/OT services with VNA  Assess for need  with assistance with transfers, mobility, and ADLs in/out of home  Patient is at high risk for falls r/t cognitive impairment, protein calorie malnutrition, pain, and age  Continue to monitor for acute changes in condition   Follow up with PCP              Encounter provider ROSARIO Zamuido    The patient was identified by name and date of birth. Grace Clayton was informed that this is a telemedicine visit and that the visit is being conducted through the [employer] Teams platform. She agrees to proceed..  My office door was closed. No one else was in the room.  She acknowledged consent and understanding of privacy and security of the video platform. The patient has agreed to participate and understands they can discontinue the visit at any time.    Patient is aware this is a billable service.     History of Present Illness      Grace Clayton is a 89 y.o. female patient, being seen for [facility] in conjuction with A nurse Zita Zamarripa, who was in the home to perform physical assessment.      The patient is being seen and examined today for follow-up on acute and chronic medical conditions s/p most recent hospitalization.     The patient's granddaughter and CG were present for visit and assisted with history. Patient has a new LLE blister today, gave orders for ABD pad with Kerlix and if blister were to open, apply Calcium alginate with ABD and Kerlix. Family notes appetite is still poor.  Patient with baseline confusion with some agitation today.             Review of Systems   Constitutional:  Positive for activity change, appetite change and fatigue. Negative for chills and fever.   HENT:  Positive for hearing loss. Negative for ear pain and sore throat.    Eyes:  Positive for visual disturbance. Negative for pain.   Respiratory:  Positive for shortness of breath. Negative for cough.    Cardiovascular:  Positive for leg swelling. Negative for chest pain and palpitations.   Gastrointestinal:   Negative for abdominal pain and vomiting.   Genitourinary:  Negative for dysuria and hematuria.        Indwelling carcamo catheter   Musculoskeletal:  Positive for arthralgias and gait problem. Negative for back pain.   Skin:  Negative for color change and rash.   Neurological:  Positive for weakness. Negative for seizures and syncope.   Psychiatric/Behavioral:  Positive for agitation and confusion.    All other systems reviewed and are negative.      Pertinent Medical History    originally presented to the hospital on 12/6/2024 due to altered mental status.  Patient was treated in the hospital with IV antibiotics for urinary tract infection x 3 days.  Patient was evaluated by speech therapy given concerns for dysphagia and poor oral intake.  It was determined that patient needs constant encouragement to eat, but is eating up to 25 to 50% of each meal.  Concerns regarding p.o. intake were discussed extensively with granddaughter who states she will continue encouraging feeding.  She is recommended a dysphagia 1 puréed diet and nectar thick liquids on discharge with meds crushed in purée.  Patient is medically stable for discharge at this time and will return home with [facility] services. Supplies to be sent home with patient today       Objective   12/12/2024  6:12 AM    Component Value Flag Ref Range Units Status   WBC 8.34  4.31 - 10.16 Thousand/uL Final   RBC 2.94  Low  3.81 - 5.12 Million/uL Final   Hemoglobin 9.1  Low  11.5 - 15.4 g/dL Final   Hematocrit 29.7  Low  34.8 - 46.1 % Final     High  82 - 98 fL Final   MCH 31.0  26.8 - 34.3 pg Final   MCHC 30.6  Low  31.4 - 37.4 g/dL Final   RDW 18.2  High  11.6 - 15.1 % Final   MPV 9.9  8.9 - 12.7 fL Final   Platelets 313  149 - 390 Thousands/uL Final   nRBC 0   /100 WBCs Final   Segmented % 75  43 - 75 % Final   Immature Grans % 1  0 - 2 % Final   Lymphocytes % 16  14 - 44 % Final   Monocytes % 6  4 - 12 % Final   Eosinophils Relative 1  0 - 6 % Final    Basophils Relative 1  0 - 1 % Final   Absolute Neutrophils 6.34  1.85 - 7.62 Thousands/µL Final   Absolute Immature Grans 0.04  0.00 - 0.20 Thousand/uL Final   Absolute Lymphocytes 1.36  0.60 - 4.47 Thousands/µL Final   Absolute Monocytes 0.48  0.17 - 1.22 Thousand/µL Final   Eosinophils Absolute 0.08  0.00 - 0.61 Thousand/µL Final   Basophils Absolute 0.04  0.00 - 0.10 Thousands/µL Final   12/12/2024  6:17 AM    Component Value Flag Ref Range Units Status   Sodium 141  135 - 147 mmol/L Final   Potassium 3.3  Low  3.5 - 5.3 mmol/L Final   Chloride 105  96 - 108 mmol/L Final   CO2 30  21 - 32 mmol/L Final   ANION GAP 6  4 - 13 mmol/L Final   BUN 20  5 - 25 mg/dL Final   Creatinine 0.57  Low  0.60 - 1.30 mg/dL Final   Comment:   Standardized to IDMS reference method   Glucose 119  65 - 140 mg/dL Final   Comment:   If the patient is fasting, the ADA then defines impaired fasting glucose as > 100 mg/dL and diabetes as > or equal to 123 mg/dL.   Calcium 7.9  Low  8.4 - 10.2 mg/dL Final   eGFR 76   ml/min/1.73sq m Final       Vitals    Vitals 12/17/2024  2:39 PM   /68   BP Location Right arm   Patient Position Supine   Cuff Size Standard   Resp 18   SpO2 99 %   SpO2 Activity At Rest   Pulse 102   Pulse Source Radial   Cardiac Regularity Regular   Temp 97.5 °F (36.4 °C)   Temp src Temporal       Physical Exam  Vitals and nursing note reviewed.   Constitutional:       General: She is not in acute distress.     Appearance: She is well-developed. She is ill-appearing.   HENT:      Head: Normocephalic and atraumatic.      Ears:      Comments: [place]  Eyes:      Conjunctiva/sclera: Conjunctivae normal.      Comments: Wears glasses   Cardiovascular:      Rate and Rhythm: Normal rate. Rhythm irregular.      Heart sounds: Normal heart sounds. No murmur heard.  Pulmonary:      Effort: Pulmonary effort is normal. No respiratory distress.      Breath sounds: Normal breath sounds.      Comments: Unable to assess r/t patient  yelling out  Abdominal:      Palpations: Abdomen is soft.      Tenderness: There is no abdominal tenderness.   Musculoskeletal:         General: No swelling.      Cervical back: Neck supple.      Right lower leg: Edema present.      Left lower leg: Edema present.   Skin:     General: Skin is warm and dry.      Capillary Refill: Capillary refill takes less than 2 seconds.      Comments: SKIN TEAR ON L ARM AND R ARM. BRUISING ON L POSTERIOR LEG. FLUID BLISTER ON RLE, and STAGE lV PRESSURE ULCER BUTTOCKS   Neurological:      Mental Status: She is alert.      Motor: Weakness present.      Coordination: Coordination abnormal.      Gait: Gait abnormal.   Psychiatric:         Attention and Perception: She is inattentive.         Mood and Affect: Mood normal.         Cognition and Memory: Cognition is impaired. Memory is impaired. She exhibits impaired recent memory and impaired remote memory.         Visit Time  Total Visit Duration: I have spent a total time of 66 minutes in caring for this patient on the day of the visit/encounter including Diagnostic results, Prognosis, Risks and benefits of tx options, Instructions for management, Patient and family education, Importance of tx compliance, Risk factor reductions, Impressions, Counseling / Coordination of care, Documenting in the medical record, Reviewing / ordering tests, medicine, procedures  , Obtaining or reviewing history  , and Communicating with other healthcare professionals .

## 2025-01-06 NOTE — ASSESSMENT & PLAN NOTE
History of dysphagia with current recommendations pureed with nectar thick liquids  Patient at home is tolerating diet puree with thin liquids  Maintain aspiration precautions  Follow up with PCP

## 2025-01-06 NOTE — ASSESSMENT & PLAN NOTE
Resolved  Patient s/p abx therapy r/t UTI felt to be secondary to chronic indwelling carcamo  Follow up with PCP

## 2025-01-06 NOTE — ASSESSMENT & PLAN NOTE
BP remains stable today, 114/68  Continue Metoprolol 25 mg BID  Avoid hypotension  Continue to monitor BP and for acute changes in condition  Follow up with PCP/Cardiology

## 2025-01-06 NOTE — ASSESSMENT & PLAN NOTE
Continue Metoprolol 25 mg BID for rate control  Continue to monitor HR, 102 today  Continue Eliquis 5 mg po BID for anticoag  Monitor for s/s of bleeding and acute changes in condition  Follow up with PCP/Cardiology

## 2025-01-06 NOTE — ASSESSMENT & PLAN NOTE
Patient uses WC at baseline  Maintain fall and safety precautions  Encourage use of asst devices  Continue PT/OT services with VNA  Assess for need with assistance with transfers, mobility, and ADLs in/out of home  Patient is at high risk for falls r/t cognitive impairment, protein calorie malnutrition, pain, and age  Continue to monitor for acute changes in condition   Follow up with PCP

## 2025-01-06 NOTE — ASSESSMENT & PLAN NOTE
Continue supportive measures  Continue to reorient, redirect, and reassure patient  Encourage engagement and activity  Encourage daily involvement in ADLs  Maintain delirium precautions and sleep/wake cycle  Patient remains at risk for delirium r/t age, medications, sleep disturbance, and pain  Avoid deliriogenic medications such as tramadol, benzodiazepines, anticholinergics, and Benadryl  Limit interruptions at night as medically appropriate  Provide quiet environment, dim lighting, and avoidance tv at night  Patient continues to require assistance in which supportive services can be provided by VNA services  Encourage adequate nutrition and hydration  Continue to monitor for acute changes in condition  Follow up with PCP

## 2025-01-06 NOTE — ASSESSMENT & PLAN NOTE
Multiple areas of skin breakdown with Stage lV sacral wound   Continue local wound care as ordered  Continue to monitor for s/s of infection  Encourage frequent changes in position and offloading  VNA will continue wound care services in home as ordered  Follow up with wound care center

## 2025-01-09 PROBLEM — T83.511A URINARY TRACT INFECTION ASSOCIATED WITH INDWELLING URETHRAL CATHETER  (HCC): Status: RESOLVED | Noted: 2024-12-06 | Resolved: 2025-01-09

## 2025-01-09 PROBLEM — N39.0 URINARY TRACT INFECTION ASSOCIATED WITH INDWELLING URETHRAL CATHETER  (HCC): Status: RESOLVED | Noted: 2024-12-06 | Resolved: 2025-01-09
